# Patient Record
Sex: MALE | Employment: OTHER | URBAN - METROPOLITAN AREA
[De-identification: names, ages, dates, MRNs, and addresses within clinical notes are randomized per-mention and may not be internally consistent; named-entity substitution may affect disease eponyms.]

---

## 2017-01-05 ENCOUNTER — OFFICE VISIT (OUTPATIENT)
Dept: DERMATOLOGY | Facility: CLINIC | Age: 67
End: 2017-01-05
Payer: COMMERCIAL

## 2017-01-05 VITALS — SYSTOLIC BLOOD PRESSURE: 163 MMHG | OXYGEN SATURATION: 99 % | HEART RATE: 77 BPM | DIASTOLIC BLOOD PRESSURE: 84 MMHG

## 2017-01-05 DIAGNOSIS — L57.0 AK (ACTINIC KERATOSIS): Primary | ICD-10-CM

## 2017-01-05 PROCEDURE — 17003 DESTRUCT PREMALG LES 2-14: CPT | Performed by: PHYSICIAN ASSISTANT

## 2017-01-05 PROCEDURE — 17000 DESTRUCT PREMALG LESION: CPT | Performed by: PHYSICIAN ASSISTANT

## 2017-01-05 NOTE — PROGRESS NOTES
Cornel Yuen is a 66 year old year old male patient here today to recheck face after PDT. Patient notes he has few spots that remain on forehead, and temples. He reports that it did clear up the majority of the spots. Remainder of the HPI, Meds, PMH, Allergies, FH, and SH was reviewed in chart.    Pertinent Hx:  History of NMSC  Past Medical History   Diagnosis Date     Essential hypertension, benign      Basal cell carcinoma      Squamous cell carcinoma (H)      Actinic keratosis        Past Surgical History   Procedure Laterality Date     Tonsillectomy & adenoidectomy       Hernia repair, inguinal rt/lt       Hc repair of nasal septum       C anesth,knee joint; nos          Family History   Problem Relation Age of Onset     CANCER Father      brain tumor     DIABETES Maternal Grandmother      Alzheimer Disease Maternal Grandmother      C.A.D. Maternal Grandfather      Alzheimer Disease Maternal Grandfather      Unknown/Adopted Paternal Grandmother      C.A.D. Paternal Grandfather      Neurologic Disorder Mother      memory issues     Alzheimer Disease Mother        Social History     Social History     Marital Status:      Spouse Name: N/A     Number of Children: N/A     Years of Education: N/A     Occupational History     Not on file.     Social History Main Topics     Smoking status: Former Smoker     Smokeless tobacco: Never Used     Alcohol Use: Yes      Comment: several beers daily/ or scotch     Drug Use: Yes      Comment: occasional- marijuana     Sexual Activity:     Partners: Female     Birth Control/ Protection: Surgical      Comment: vasectomy     Other Topics Concern     Parent/Sibling W/ Cabg, Mi Or Angioplasty Before 65f 55m? No     Social History Narrative       Outpatient Encounter Prescriptions as of 1/5/2017   Medication Sig Dispense Refill     atenolol (TENORMIN) 25 MG tablet Take 1 tablet (25 mg) by mouth daily 90 tablet 3     valsartan (DIOVAN) 160 MG tablet Take 2 tablets (320 mg)  by mouth daily Patient had a cough to lisinopril.  He did not tolerate the other ARBs, they were tried before and he ended up on valsartan.  Please do prior authorization if these explanations do not help. 180 tablet 3     sildenafil (VIAGRA) 100 MG tablet Take 0.5-1 tablets ( mg) by mouth daily as needed for erectile dysfunction Take 30 min to 4 hours before intercourse.  Hold on file until needed 6 tablet 11     ASPIRIN 81 MG OR TABS 1 TABLET DAILY       ORDER FOR DME one blood pressure machine one 0     GLUCOSAMINE-CHONDROITIN 750-600 MG OR TABS 1 tab daily 0 0     No facility-administered encounter medications on file as of 1/5/2017.             Review Of Systems  Skin: As above  Eyes: negative  Ears/Nose/Throat: negative  Respiratory: No shortness of breath, dyspnea on exertion, cough, or hemoptysis  Cardiovascular: negative  Gastrointestinal: negative  Genitourinary: negative  Musculoskeletal: negative  Neurologic: negative  Psychiatric: negative  Hematologic/Lymphatic/Immunologic: negative  Endocrine: negative      O:   NAD, WDWN, Alert & Oriented, Mood & Affect wnl, Vitals stable   Here today alone   /84 mmHg  Pulse 77  SpO2 99%   General appearance normal   Vitals stable   Alert, oriented and in no acute distress      Pink gritty papules x 10 on forehead and temples       Eyes: Conjunctivae/lids:Normal     ENT: Lips    MSK:Normal    Pulm: Breathing Normal    Neuro/Psych: Orientation:Normal; Mood/Affect:Normal  A/P:  1. Actinic keratosis x 10 on face   LN2:  Treated with LN2 for 5s for 1-2 cycles. Warned risks of blistering, pain, pigment change, scarring, and incomplete resolution.  Advised patient to return if lesions do not completely resolve.  Wound care sheet given.    Recheck in 6 months.

## 2017-01-05 NOTE — MR AVS SNAPSHOT
After Visit Summary   1/5/2017    Cornel Yuen    MRN: [id number]           Patient Information     Date Of Birth          1950        Visit Information        Provider Department      1/5/2017 7:20 AM Karen Chan PA-C [facility]        Care Instructions    WOUND CARE INSTRUCTIONS   FOR CRYOSURGERY   This area treated with liquid nitrogen will form a blister. You do not need to bandage the area until after the blister forms and breaks (which may be a few days). When the blister breaks, begin daily dressing changes as follows:   1) Clean and dry the area with tap water using clean Q-tip or sterile gauze pad.   2) Apply Polysporin ointment or Bacitracin ointment over entire wound. Do NOT use Neosporin ointment.   3) Cover the wound with a band-aid or sterile non-stick gauze pad and micropore paper tape.   REPEAT THESE INSTRUCTIONS AT LEAST ONCE A DAY UNTIL THE WOUND HAS COMPLETELY HEALED.   It is an old wives tale that a wound heals better when it is exposed to air and allowed to dry out. The wound will heal faster with a better cosmetic result if it is kept moist with ointment and covered with a bandage.   Do not let the wound dry out.   IMPORTANT INFORMATION ON REVERSE SIDE   Supplies Needed:   *Cotton tipped applicators (Q-tips)   *Polysporin ointment or Bacitracin ointment (NOT NEOSPORIN)   *Band-aids, or non stick gauze pads and micropore paper tape   PATIENT INFORMATION   During the healing process you will notice a number of changes. All wounds develop a small halo of redness surrounding the wound. This means healing is occurring. Severe itching with extensive redness usually indicates sensitivity to the ointment or bandage tape used to dress the wound. You should call our office if this develops.   Swelling and/or discoloration around your surgical site is common, particularly when performed around the eye.   All wounds normally drain. The larger the wound the  "more drainage there will be. After 7-10 days, you will notice the wound beginning to shrink and new skin will begin to grow. The wound is healed when you can see skin has formed over the entire area. A healed wound has a healthy, shiny look to the surface and is red to dark pink in color to normalize. Wounds may take approximately 4-6 weeks to heal. Larger wounds may take 6-8 weeks. After the wound is healed you may discontinue dressing changes.   You may experience a sensation of tightness as your wound heals. This is normal and will gradually subside.   Your healed wound may be sensitive to temperature changes. This sensitivity improves with time, but if you re having a lot of discomfort, try to avoid temperature extremes.   Patients frequently experience itching after their wound appears to have healed because of the continue healing under the skin. Plain Vaseline will help relieve the itching.               Follow-ups after your visit        Who to contact     If you have questions or need follow up information about today's clinic visit or your schedule please contact [facility] directly at [phone].  Normal or non-critical lab and imaging results will be communicated to you by [employer], letter or phone within 4 business days after the clinic has received the results. If you do not hear from us within 7 days, please contact the clinic through ActionIQt or phone. If you have a critical or abnormal lab result, we will notify you by phone as soon as possible.  Submit refill requests through Neo[employer]x or call your pharmacy and they will forward the refill request to us. Please allow 3 business days for your refill to be completed.          Additional Information About Your Visit        Neo[employer]x Information     Neo[employer]x lets you send messages to your doctor, view your test results, renew your prescriptions, schedule appointments and more. To sign up, go to [email or web address] . Click on \"Log in\" on " "the left side of the screen, which will take you to the Welcome page. Then click on \"Sign up Now\" on the right side of the page.     You will be asked to enter the access code listed below, as well as some personal information. Please follow the directions to create your username and password.     Your access code is: [email or web address]  Expires: 2017  8:41 AM     Your access code will  in 90 days. If you need help or a new code, please call your [facility] or [phone].        Care EveryWhere ID     This is your Care EveryWhere ID. This could be used by other organizations to access your [place] medical records  [id number]        Your Vitals Were     Pulse Pulse Oximetry                77 99%           Blood Pressure from Last 3 Encounters:   17 163/84   16 146/86   10/24/16 135/88    Weight from Last 3 Encounters:   16 72.576 kg (160 lb)   02/18/15 73.029 kg (161 lb)   14 67.586 kg (149 lb)              Today, you had the following     No orders found for display       Primary Care Provider Office Phone # Fax #    Noah Brumfield [phone]       [facility] [address] [facility] [address]        Thank you!     Thank you for choosing [facility]  for your care. Our goal is always to provide you with excellent care. Hearing back from our patients is one way we can continue to improve our services. Please take a few minutes to complete the written survey that you may receive in the mail after your visit with us. Thank you!             Your Updated Medication List - Protect others around you: Learn how to safely use, store and throw away your medicines at www.disposemymeds.org.          This list is accurate as of: 17  7:27 AM.  Always use your most recent med list.                   Brand Name Dispense Instructions for use    aspirin 81 MG tablet      1 TABLET DAILY       atenolol 25 MG tablet    TENORMIN    90 tablet    " Take 1 tablet (25 mg) by mouth daily       glucosamine-chondroitinoitin 750-600 MG Tabs     0    1 tab daily       order for DME     one    one blood pressure machine       sildenafil 100 MG cap/tab    VIAGRA    6 tablet    Take 0.5-1 tablets ( mg) by mouth daily as needed for erectile dysfunction Take 30 min to 4 hours before intercourse.  Hold on file until needed       valsartan 160 MG tablet    DIOVAN    180 tablet    Take 2 tablets (320 mg) by mouth daily Patient had a cough to lisinopril.  He did not tolerate the other ARBs, they were tried before and he ended up on valsartan.  Please do prior authorization if these explanations do not help.

## 2017-01-05 NOTE — NURSING NOTE
"Chief Complaint   Patient presents with     Derm Problem     PDT fu       Initial /84 mmHg  Pulse 77  SpO2 99% Estimated body mass index is 23.62 kg/(m^2) as calculated from the following:    Height as of 12/1/16: 1.753 m (5' 9\").    Weight as of 2/24/16: 72.576 kg (160 lb).  BP completed using cuff size: jed Pillai LPN.................1/5/2017    "

## 2017-01-05 NOTE — PATIENT INSTRUCTIONS
WOUND CARE INSTRUCTIONS   FOR CRYOSURGERY   This area treated with liquid nitrogen will form a blister. You do not need to bandage the area until after the blister forms and breaks (which may be a few days). When the blister breaks, begin daily dressing changes as follows:   1) Clean and dry the area with tap water using clean Q-tip or sterile gauze pad.   2) Apply Polysporin ointment or Bacitracin ointment over entire wound. Do NOT use Neosporin ointment.   3) Cover the wound with a band-aid or sterile non-stick gauze pad and micropore paper tape.   REPEAT THESE INSTRUCTIONS AT LEAST ONCE A DAY UNTIL THE WOUND HAS COMPLETELY HEALED.   It is an old wives tale that a wound heals better when it is exposed to air and allowed to dry out. The wound will heal faster with a better cosmetic result if it is kept moist with ointment and covered with a bandage.   Do not let the wound dry out.   IMPORTANT INFORMATION ON REVERSE SIDE   Supplies Needed:   *Cotton tipped applicators (Q-tips)   *Polysporin ointment or Bacitracin ointment (NOT NEOSPORIN)   *Band-aids, or non stick gauze pads and micropore paper tape   PATIENT INFORMATION   During the healing process you will notice a number of changes. All wounds develop a small halo of redness surrounding the wound. This means healing is occurring. Severe itching with extensive redness usually indicates sensitivity to the ointment or bandage tape used to dress the wound. You should call our office if this develops.   Swelling and/or discoloration around your surgical site is common, particularly when performed around the eye.   All wounds normally drain. The larger the wound the more drainage there will be. After 7-10 days, you will notice the wound beginning to shrink and new skin will begin to grow. The wound is healed when you can see skin has formed over the entire area. A healed wound has a healthy, shiny look to the surface and is red to dark pink in color to normalize.  Wounds may take approximately 4-6 weeks to heal. Larger wounds may take 6-8 weeks. After the wound is healed you may discontinue dressing changes.   You may experience a sensation of tightness as your wound heals. This is normal and will gradually subside.   Your healed wound may be sensitive to temperature changes. This sensitivity improves with time, but if you re having a lot of discomfort, try to avoid temperature extremes.   Patients frequently experience itching after their wound appears to have healed because of the continue healing under the skin. Plain Vaseline will help relieve the itching.

## 2017-05-09 ENCOUNTER — OFFICE VISIT (OUTPATIENT)
Dept: FAMILY MEDICINE | Facility: CLINIC | Age: 67
End: 2017-05-09
Payer: COMMERCIAL

## 2017-05-09 VITALS — SYSTOLIC BLOOD PRESSURE: 136 MMHG | HEART RATE: 64 BPM | DIASTOLIC BLOOD PRESSURE: 78 MMHG | TEMPERATURE: 98 F

## 2017-05-09 DIAGNOSIS — N52.9 ERECTILE DYSFUNCTION, UNSPECIFIED ERECTILE DYSFUNCTION TYPE: ICD-10-CM

## 2017-05-09 DIAGNOSIS — Z23 NEED FOR VACCINATION: Primary | ICD-10-CM

## 2017-05-09 DIAGNOSIS — I10 ESSENTIAL HYPERTENSION, BENIGN: ICD-10-CM

## 2017-05-09 DIAGNOSIS — Z12.5 SCREENING FOR PROSTATE CANCER: ICD-10-CM

## 2017-05-09 DIAGNOSIS — E78.5 HYPERLIPIDEMIA LDL GOAL <130: ICD-10-CM

## 2017-05-09 LAB
ANION GAP SERPL CALCULATED.3IONS-SCNC: 6 MMOL/L (ref 3–14)
BUN SERPL-MCNC: 21 MG/DL (ref 7–30)
CALCIUM SERPL-MCNC: 9.4 MG/DL (ref 8.5–10.1)
CHLORIDE SERPL-SCNC: 101 MMOL/L (ref 94–109)
CO2 SERPL-SCNC: 28 MMOL/L (ref 20–32)
CREAT SERPL-MCNC: 0.88 MG/DL (ref 0.66–1.25)
GFR SERPL CREATININE-BSD FRML MDRD: 87 ML/MIN/1.7M2
GLUCOSE SERPL-MCNC: 110 MG/DL (ref 70–99)
POTASSIUM SERPL-SCNC: 4.1 MMOL/L (ref 3.4–5.3)
PSA SERPL-ACNC: 0.7 UG/L (ref 0–4)
SODIUM SERPL-SCNC: 135 MMOL/L (ref 133–144)

## 2017-05-09 PROCEDURE — 90471 IMMUNIZATION ADMIN: CPT | Performed by: FAMILY MEDICINE

## 2017-05-09 PROCEDURE — 99213 OFFICE O/P EST LOW 20 MIN: CPT | Mod: 25 | Performed by: FAMILY MEDICINE

## 2017-05-09 PROCEDURE — 90736 HZV VACCINE LIVE SUBQ: CPT | Performed by: FAMILY MEDICINE

## 2017-05-09 PROCEDURE — 36415 COLL VENOUS BLD VENIPUNCTURE: CPT | Performed by: FAMILY MEDICINE

## 2017-05-09 PROCEDURE — 80048 BASIC METABOLIC PNL TOTAL CA: CPT | Performed by: FAMILY MEDICINE

## 2017-05-09 PROCEDURE — G0103 PSA SCREENING: HCPCS | Performed by: FAMILY MEDICINE

## 2017-05-09 RX ORDER — ATENOLOL 25 MG/1
25 TABLET ORAL DAILY
Qty: 90 TABLET | Refills: 3 | Status: SHIPPED | OUTPATIENT
Start: 2017-05-09 | End: 2018-06-08

## 2017-05-09 RX ORDER — VALSARTAN 160 MG/1
320 TABLET ORAL DAILY
Qty: 180 TABLET | Refills: 3 | Status: SHIPPED | OUTPATIENT
Start: 2017-05-09 | End: 2018-06-08

## 2017-05-09 RX ORDER — SILDENAFIL 100 MG/1
50-100 TABLET, FILM COATED ORAL DAILY PRN
Qty: 6 TABLET | Refills: 11 | Status: SHIPPED | OUTPATIENT
Start: 2017-05-09 | End: 2018-06-08

## 2017-05-09 NOTE — PROGRESS NOTES
SUBJECTIVE:                                                    Cornel Yuen is a 66 year old male who presents to clinic today for the following health issues:      Hypertension Follow-up      Outpatient blood pressures are being checked at other doctor/dentist appts.  Results are 130s-140s.    Low Salt Diet: no added salt       Amount of exercise or physical activity: has an active job    Problems taking medications regularly: No    Medication side effects: occ dizziness    Diet: low salt    Needs refill of his medications.  No side effect.  He will be traveling to [place] and wanted information.  Discussed [email or web address] as a place for information.        Problem list and histories reviewed & adjusted, as indicated.  Additional history: as documented        Reviewed and updated as needed this visit by clinical staff  Tobacco  Med Hx  Surg Hx  Fam Hx  Soc Hx      Reviewed and updated as needed this visit by Provider         ROS:  CONSTITUTIONAL:NEGATIVE for fever, chills, change in weight  RESP:NEGATIVE for significant cough or SOB  CV: NEGATIVE for chest pain, palpitations or peripheral edema  NEURO: NEGATIVE for weakness, dizziness or paresthesias  PSYCHIATRIC: NEGATIVE for changes in mood or affect    OBJECTIVE:                                                    /78 (Cuff Size: Adult Large)  Pulse 64  Temp 98  F (36.7  C) (Tympanic)  There is no height or weight on file to calculate BMI.  GENERAL APPEARANCE: healthy, alert and no distress  RESP: lungs clear to auscultation - no rales, rhonchi or wheezes  CV: regular rates and rhythm, normal S1 S2, no S3 or S4 and no murmur, click or rub  MS: extremities normal- no gross deformities noted  SKIN: no suspicious lesions or rashes  NEURO: Normal strength and tone, mentation intact and speech normal  PSYCH: mentation appears normal and affect normal/bright         ASSESSMENT/PLAN:                                                    1. BENIGN  HYPERTENSION  Controlled and refilled med, go to lab  - atenolol (TENORMIN) 25 MG tablet; Take 1 tablet (25 mg) by mouth daily  Dispense: 90 tablet; Refill: 3  - valsartan (DIOVAN) 160 MG tablet; Take 2 tablets (320 mg) by mouth daily He did not tolerate the other ARBs, they were tried before and he ended up on valsartan.  Dispense: 180 tablet; Refill: 3    2. Erectile dysfunction, unspecified erectile dysfunction type  Refilled med  - sildenafil (VIAGRA) 100 MG cap/tab; Take 0.5-1 tablets ( mg) by mouth daily as needed for erectile dysfunction Hold on file until needed  Dispense: 6 tablet; Refill: 11      See Patient Instructions  Counseled on travel, at this time he is very low risk for needing hep b for travel and the type of activity he will be doing.    Counseling/Coordination of care is over 10 min in 15 min appt.      Noah Brumfield MD  [facility]

## 2017-05-09 NOTE — MR AVS SNAPSHOT
After Visit Summary   5/9/2017    Cornel Yuen    MRN: [id number]           Patient Information     Date Of Birth          1950        Visit Information        Provider Department      5/9/2017 8:00 AM Noah Brumfield MD [facility]        Today's Diagnoses     BENIGN HYPERTENSION        Erectile dysfunction, unspecified erectile dysfunction type          Care Instructions    I refilled your medications for the year.    Please go to lab.    We will give you your shingles vaccine.    You can always go to [email or web address]/travel for any information before you go on your trip.          Thank you for choosing [facility].  You may be receiving a survey in the mail from [employer] regarding your visit today.  Please take a few minutes to complete and return the survey to let us know how we are doing.      If you have questions or concerns, please contact us via [employer] or you can contact your care team at [phone].    Our Clinic hours are:  Monday 6:40 am  to 7:00 pm  Tuesday -Friday 6:40 am to 5:00 pm    The [place] outpatient lab hours are:  Monday - Friday 6:10 am to 4:45 pm  Saturdays 7:00 am to 11:00 am  Appointments are required, call [phone]    If you have clinical questions after hours or would like to schedule an appointment,  call the clinic at [phone].          Follow-ups after your visit        Who to contact     If you have questions or need follow up information about today's clinic visit or your schedule please contact [facility] directly at [phone].  Normal or non-critical lab and imaging results will be communicated to you by MyChart, letter or phone within 4 business days after the clinic has received the results. If you do not hear from us within 7 days, please contact the clinic through [employer] or phone. If you have a critical or abnormal lab result, we will notify you by phone as soon as possible.  Submit refill requests through  "Anupt or call your pharmacy and they will forward the refill request to us. Please allow 3 business days for your refill to be completed.          Additional Information About Your Visit        MyChart Information     A[employer] Music Technologyhart lets you send messages to your doctor, view your test results, renew your prescriptions, schedule appointments and more. To sign up, go to [email or web address] . Click on \"Log in\" on the left side of the screen, which will take you to the Welcome page. Then click on \"Sign up Now\" on the right side of the page.     You will be asked to enter the access code listed below, as well as some personal information. Please follow the directions to create your username and password.     Your access code is: [email or web address]  Expires: 2017  8:28 AM     Your access code will  in 90 days. If you need help or a new code, please call your [place] clinic or [phone].        Care EveryWhere ID     This is your Care EveryWhere ID. This could be used by other organizations to access your [place] medical records  [id number]        Your Vitals Were     Pulse Temperature                64 98  F (36.7  C) (Tympanic)           Blood Pressure from Last 3 Encounters:   17 136/78   17 163/84   16 146/86    Weight from Last 3 Encounters:   16 160 lb (72.6 kg)   02/18/15 161 lb (73 kg)   14 149 lb (67.6 kg)              Today, you had the following     No orders found for display         Today's Medication Changes          These changes are accurate as of: 17  8:28 AM.  If you have any questions, ask your nurse or doctor.               These medicines have changed or have updated prescriptions.        Dose/Directions    sildenafil 100 MG cap/tab   Commonly known as:  VIAGRA   This may have changed:  additional instructions   Used for:  Erectile dysfunction, unspecified erectile dysfunction type   Changed by:  Noah Brumfield MD        Dose:   mg   Take 0.5-1 " tablets ( mg) by mouth daily as needed for erectile dysfunction Hold on file until needed   Quantity:  6 tablet   Refills:  11       valsartan 160 MG tablet   Commonly known as:  DIOVAN   This may have changed:  additional instructions   Used for:  Essential hypertension, benign   Changed by:  Noah Brumfield MD        Dose:  320 mg   Take 2 tablets (320 mg) by mouth daily He did not tolerate the other ARBs, they were tried before and he ended up on valsartan.   Quantity:  180 tablet   Refills:  3            Where to get your medicines      These [facility] [id number] [place] [address]  [address] [place] [id number]     Phone:  [phone]     atenolol 25 MG tablet    sildenafil 100 MG cap/tab    valsartan 160 MG tablet                Primary Care Provider Office Phone # Fax #    Noah Brumfield [phone]       [facility] [address] [facility] [address]        Thank you!     Thank you for choosing [facility]  for your care. Our goal is always to provide you with excellent care. Hearing back from our patients is one way we can continue to improve our services. Please take a few minutes to complete the written survey that you may receive in the mail after your visit with us. Thank you!             Your Updated Medication List - Protect others around you: Learn how to safely use, store and throw away your medicines at www.disposemymeds.org.          This list is accurate as of: 5/9/17  8:28 AM.  Always use your most recent med list.                   Brand Name Dispense Instructions for use    aspirin 81 MG tablet      1 TABLET DAILY       atenolol 25 MG tablet    TENORMIN    90 tablet    Take 1 tablet (25 mg) by mouth daily       glucosamine-chondroitinoitin 750-600 MG Tabs     0    1 tab daily       order for DME     one    one blood pressure machine       sildenafil 100 MG cap/tab    VIAGRA    6  tablet    Take 0.5-1 tablets ( mg) by mouth daily as needed for erectile dysfunction Hold on file until needed       valsartan 160 MG tablet    DIOVAN    180 tablet    Take 2 tablets (320 mg) by mouth daily He did not tolerate the other ARBs, they were tried before and he ended up on valsartan.

## 2017-05-09 NOTE — PATIENT INSTRUCTIONS
I refilled your medications for the year.    Please go to lab.    We will give you your shingles vaccine.    You can always go to cdc.gov/travel for any information before you go on your trip.          Thank you for choosing [facility].  You may be receiving a survey in the mail from Jayy Garcia regarding your visit today.  Please take a few minutes to complete and return the survey to let us know how we are doing.      If you have questions or concerns, please contact us via Cloudmark or you can contact your care team at [phone].    Our Clinic hours are:  Monday 6:40 am  to 7:00 pm  Tuesday -Friday 6:40 am to 5:00 pm    The [place] outpatient lab hours are:  Monday - Friday 6:10 am to 4:45 pm  Saturdays 7:00 am to 11:00 am  Appointments are required, call [phone]    If you have clinical questions after hours or would like to schedule an appointment,  call the clinic at [phone].

## 2017-05-09 NOTE — NURSING NOTE
"Chief Complaint   Patient presents with     Hypertension     recheck B/P was elevated at Derm appt. medication refill.       Initial /78 (Cuff Size: Adult Large)  Pulse 64  Temp 98  F (36.7  C) (Tympanic) Estimated body mass index is 23.29 kg/(m^2) as calculated from the following:    Height as of 2/24/16: 5' 9.5\" (1.765 m).    Weight as of 2/24/16: 160 lb (72.6 kg).  Medication Reconciliation: complete     Screening Questionnaire for Adult Immunization    Are you sick today?   No   Do you have allergies to medications, food, a vaccine component or latex?   No   Have you ever had a serious reaction after receiving a vaccination?   No   Do you have a long-term health problem with heart disease, lung disease, asthma, kidney disease, metabolic disease (e.g. diabetes), anemia, or other blood disorder?   No   Do you have cancer, leukemia, HIV/AIDS, or any other immune system problem?   No   In the past 3 months, have you taken medications that affect  your immune system, such as prednisone, other steroids, or anticancer drugs; drugs for the treatment of rheumatoid arthritis, Crohn s disease, or psoriasis; or have you had radiation treatments?   No   Have you had a seizure, or a brain or other nervous system problem?   No   During the past year, have you received a transfusion of blood or blood     products, or been given immune (gamma) globulin or antiviral drug?   No   For women: Are you pregnant or is there a chance you could become        pregnant during the next month?   No   Have you received any vaccinations in the past 4 weeks?   No     Immunization questionnaire answers were all negative.      Per orders of Dr. Orestes Brumfield, injection of Zostavax given by Shanta Kelley.   Patient instructed to remain in clinic for 20 minutes afterwards, and to report any adverse reaction to me immediately.       Screening performed by Shanta Kelley on 5/9/2017 at 8:47 AM.    "

## 2017-05-09 NOTE — LETTER
[facility]  [address] [place] [address]  Phone: [phone]    May 9, 2017    Cornel H Wilfrid  [address] [place] [address]          Dear Areli Yuen,    The results of your recent lab tests were :Normal kidney function.  No signs of prostate cancer. .  Component      Latest Ref Rng & Units 5/9/2017   Sodium      133 - 144 mmol/L 135   Potassium      3.4 - 5.3 mmol/L 4.1   Chloride      94 - 109 mmol/L 101   Carbon Dioxide      20 - 32 mmol/L 28   Anion Gap      3 - 14 mmol/L 6   Glucose      70 - 99 mg/dL 110 (H)   Urea Nitrogen      7 - 30 mg/dL 21   Creatinine      0.66 - 1.25 mg/dL 0.88   GFR Estimate      >60 mL/min/1.7m2 87   GFR Estimate If Black      >60 mL/min/1.7m2 >90 . . .   Calcium      8.5 - 10.1 mg/dL 9.4   PSA      0 - 4 ug/L 0.70      If you have any further questions or problems, please contact our office.    Sincerely,      Noah Brumfield MD / chandrika

## 2017-05-09 NOTE — LETTER
[facility]  [address] [address]  [phone]        May 18, 2018    Cornel Yuen  [address]  [facility] [place] [address]              Dear Cornel Yuen    This is to remind you that your fasting cholesterol is due.    You may call our office at [phone] to schedule an appointment.    Please disregard this notice if you have already had your labs drawn or made an appointment.        Sincerely,        Noah Brumfield MD

## 2017-10-20 ENCOUNTER — ALLIED HEALTH/NURSE VISIT (OUTPATIENT)
Dept: FAMILY MEDICINE | Facility: CLINIC | Age: 67
End: 2017-10-20
Payer: COMMERCIAL

## 2017-10-20 DIAGNOSIS — Z23 NEED FOR PROPHYLACTIC VACCINATION AND INOCULATION AGAINST INFLUENZA: Primary | ICD-10-CM

## 2017-10-20 PROCEDURE — 90662 IIV NO PRSV INCREASED AG IM: CPT

## 2017-10-20 PROCEDURE — 99207 ZZC NO CHARGE NURSE ONLY: CPT

## 2017-10-20 PROCEDURE — 90471 IMMUNIZATION ADMIN: CPT

## 2017-10-20 NOTE — PROGRESS NOTES
Injectable Influenza Immunization Documentation    1.  Is the person to be vaccinated sick today?   No    2. Does the person to be vaccinated have an allergy to a component   of the vaccine?   No  Egg Allergy Algorithm Link    3. Has the person to be vaccinated ever had a serious reaction   to influenza vaccine in the past?   No    4. Has the person to be vaccinated ever had Guillain-Barré syndrome?   No    Form completed by Claudia Do CMA  Per orders of Dr. White, injection of flu vaccine given by Claudia Do. Patient instructed to remain in clinic for 15 minutes afterwards, and to report any adverse reaction to me immediately.

## 2017-10-20 NOTE — MR AVS SNAPSHOT
"              After Visit Summary   10/20/2017    Cornel Yuen    MRN: [id number]           Patient Information     Date Of Birth          1950        Visit Information        Provider Department      10/20/2017 8:55 AM [facility]        Today's Diagnoses     Need for prophylactic vaccination and inoculation against influenza    -  1       Follow-ups after your visit        Who to contact     If you have questions or need follow up information about today's clinic visit or your schedule please contact [facility] directly at [phone].  Normal or non-critical lab and imaging results will be communicated to you by Aductionshart, letter or phone within 4 business days after the clinic has received the results. If you do not hear from us within 7 days, please contact the clinic through [employer] or phone. If you have a critical or abnormal lab result, we will notify you by phone as soon as possible.  Submit refill requests through [employer] or call your pharmacy and they will forward the refill request to us. Please allow 3 business days for your refill to be completed.          Additional Information About Your Visit        MyChart Information     [employer] lets you send messages to your doctor, view your test results, renew your prescriptions, schedule appointments and more. To sign up, go to [email or web address] . Click on \"Log in\" on the left side of the screen, which will take you to the Welcome page. Then click on \"Sign up Now\" on the right side of the page.     You will be asked to enter the access code listed below, as well as some personal information. Please follow the directions to create your username and password.     Your access code is: [email or web address]  Expires: 2018  9:03 AM     Your access code will  in 90 days. If you need help or a new code, please call your [facility] or [phone].        Care EveryWhere ID     This is your Care " EveryWhere ID. This could be used by other organizations to access your [place] medical records  [id number]         Blood Pressure from Last 3 Encounters:   05/09/17 136/78   01/05/17 163/84   12/01/16 146/86    Weight from Last 3 Encounters:   02/24/16 160 lb (72.6 kg)   02/18/15 161 lb (73 kg)   02/19/14 149 lb (67.6 kg)              We Performed the Following     FLU VACCINE, INCREASED ANTIGEN, PRESV FREE, AGE 65+ [35751]     Vaccine Administration, Initial [50177]        Primary Care Provider Office Phone # Fax #    Noah Brumfield [phone] [address] [address]        Equal Access to Services     JOSE AZAR : Emely lopezo Somichaelali, waaxda luqadaha, qaybta kaalmada adeegyada, jon cisneros. So [facility] [phone].    ATENCIÓN: Si habla español, tiene a amor disposición servicios gratuitos de asistencia lingüística. [facility] [phone].    We comply with applicable federal civil rights laws and [place] laws. We do not discriminate on the basis of race, color, national origin, age, disability, sex, sexual orientation, or gender identity.            Thank you!     Thank you for choosing [facility]  for your care. Our goal is always to provide you with excellent care. Hearing back from our patients is one way we can continue to improve our services. Please take a few minutes to complete the written survey that you may receive in the mail after your visit with us. Thank you!             Your Updated Medication List - Protect others around you: Learn how to safely use, store and throw away your medicines at www.disposemymeds.org.          This list is accurate as of: 10/20/17  9:03 AM.  Always use your most recent med list.                   Brand Name Dispense Instructions for use Diagnosis    aspirin 81 MG tablet      1 TABLET DAILY        atenolol 25 MG tablet    TENORMIN    90 tablet    Take 1 tablet (25 mg) by mouth  daily    Essential hypertension, benign       glucosamine-chondroitinoitin 750-600 MG Tabs     0    1 tab daily    Knee pain       order for DME     one    one blood pressure machine    Essential hypertension, benign       sildenafil 100 MG tablet    VIAGRA    6 tablet    Take 0.5-1 tablets ( mg) by mouth daily as needed for erectile dysfunction Hold on file until needed    Erectile dysfunction, unspecified erectile dysfunction type       valsartan 160 MG tablet    DIOVAN    180 tablet    Take 2 tablets (320 mg) by mouth daily He did not tolerate the other ARBs, they were tried before and he ended up on valsartan.    Essential hypertension, benign

## 2018-02-19 ENCOUNTER — OFFICE VISIT (OUTPATIENT)
Dept: DERMATOLOGY | Facility: CLINIC | Age: 68
End: 2018-02-19
Payer: COMMERCIAL

## 2018-02-19 VITALS — DIASTOLIC BLOOD PRESSURE: 77 MMHG | SYSTOLIC BLOOD PRESSURE: 137 MMHG | HEART RATE: 66 BPM | OXYGEN SATURATION: 96 %

## 2018-02-19 DIAGNOSIS — L81.4 LENTIGO: Primary | ICD-10-CM

## 2018-02-19 DIAGNOSIS — L82.1 SK (SEBORRHEIC KERATOSIS): ICD-10-CM

## 2018-02-19 DIAGNOSIS — L57.0 AK (ACTINIC KERATOSIS): ICD-10-CM

## 2018-02-19 DIAGNOSIS — Z85.828 HISTORY OF SKIN CANCER: ICD-10-CM

## 2018-02-19 DIAGNOSIS — D18.00 ANGIOMA: ICD-10-CM

## 2018-02-19 PROCEDURE — 99213 OFFICE O/P EST LOW 20 MIN: CPT | Mod: 25 | Performed by: DERMATOLOGY

## 2018-02-19 PROCEDURE — 17000 DESTRUCT PREMALG LESION: CPT | Performed by: DERMATOLOGY

## 2018-02-19 PROCEDURE — 17003 DESTRUCT PREMALG LES 2-14: CPT | Performed by: DERMATOLOGY

## 2018-02-19 NOTE — NURSING NOTE
Chief Complaint   Patient presents with     Skin Check       Vitals:    02/19/18 1303   BP: 137/77   Pulse: 66   SpO2: 96%     Wt Readings from Last 1 Encounters:   02/24/16 72.6 kg (160 lb)       Chioma Pillai LPN.................2/19/2018

## 2018-02-19 NOTE — MR AVS SNAPSHOT
"              After Visit Summary   2018    Cornel Yuen    MRN: [id number]           Patient Information     Date Of Birth          1950        Visit Information        Provider Department      2018 1:00 PM Edin De La Cruz MD [facility]        Today's Diagnoses     Lentigo    -  1    SK (seborrheic keratosis)        Angioma        History of skin cancer        AK (actinic keratosis)           Follow-ups after your visit        Who to contact     If you have questions or need follow up information about today's clinic visit or your schedule please contact [facility] directly at [phone].  Normal or non-critical lab and imaging results will be communicated to you by [employer], letter or phone within 4 business days after the clinic has received the results. If you do not hear from us within 7 days, please contact the clinic through MyChart or phone. If you have a critical or abnormal lab result, we will notify you by phone as soon as possible.  Submit refill requests through [employer] or call your pharmacy and they will forward the refill request to us. Please allow 3 business days for your refill to be completed.          Additional Information About Your Visit        MyChart Information     [employer] lets you send messages to your doctor, view your test results, renew your prescriptions, schedule appointments and more. To sign up, go to [email or web address] . Click on \"Log in\" on the left side of the screen, which will take you to the Welcome page. Then click on \"Sign up Now\" on the right side of the page.     You will be asked to enter the access code listed below, as well as some personal information. Please follow the directions to create your username and password.     Your access code is: [email or web address]  Expires: 2018  1:09 PM     Your access code will  in 90 days. If you need help or a new code, please call your [facility] or " [phone].        Care EveryWhere ID     This is your Care EveryWhere ID. This could be used by other organizations to access your [place] medical records  [id number]        Your Vitals Were     Pulse Pulse Oximetry                66 96%           Blood Pressure from Last 3 Encounters:   02/19/18 137/77   05/09/17 136/78   01/05/17 163/84    Weight from Last 3 Encounters:   02/24/16 72.6 kg (160 lb)   02/18/15 73 kg (161 lb)   02/19/14 67.6 kg (149 lb)              We Performed the Following     DESTRUCT PREMALIGNANT LESION, 2-14     DESTRUCT PREMALIGNANT LESION, FIRST        Primary Care Provider Office Phone # Fax #    Noah Brumfield [phone] [address] [facility] [address]        Equal Access to Services     JOSE AZAR : Hadii denita booth hadasho Sothomas, waaxda luqadaha, qaybta kaalmada adeafricayada, jon booker . So [facility] [phone].    ATENCIÓN: Si habla español, tiene a amor disposición servicios gratuitos de asistencia lingüística. Leola al [phone].    We comply with applicable federal civil rights laws and [place] laws. We do not discriminate on the basis of race, color, national origin, age, disability, sex, sexual orientation, or gender identity.            Thank you!     Thank you for choosing [facility]  for your care. Our goal is always to provide you with excellent care. Hearing back from our patients is one way we can continue to improve our services. Please take a few minutes to complete the written survey that you may receive in the mail after your visit with us. Thank you!             Your Updated Medication List - Protect others around you: Learn how to safely use, store and throw away your medicines at www.disposemymeds.org.          This list is accurate as of 2/19/18  1:09 PM.  Always use your most recent med list.                   Brand Name Dispense Instructions for use Diagnosis    aspirin 81 MG tablet       1 TABLET DAILY        atenolol 25 MG tablet    TENORMIN    90 tablet    Take 1 tablet (25 mg) by mouth daily    Essential hypertension, benign       glucosamine-chondroitinoitin 750-600 MG Tabs     0    1 tab daily    Knee pain       order for DME     one    one blood pressure machine    Essential hypertension, benign       sildenafil 100 MG tablet    VIAGRA    6 tablet    Take 0.5-1 tablets ( mg) by mouth daily as needed for erectile dysfunction Hold on file until needed    Erectile dysfunction, unspecified erectile dysfunction type       valsartan 160 MG tablet    DIOVAN    180 tablet    Take 2 tablets (320 mg) by mouth daily He did not tolerate the other ARBs, they were tried before and he ended up on valsartan.    Essential hypertension, benign

## 2018-02-19 NOTE — PROGRESS NOTES
Cornel Yuen is a 67 year old year old male patient here today for f/u hxof actinic keratosis and non-melanoma skin cancer  He noes some rough spot son hands today.  .  Patient states this has been present for months.  Patient reports the following symptoms:  none .  Patient reports the following previous treatments none.  Patient reports the following modifying factors none.  Associated symptoms: none.  Patient has no other skin complaints today.  Remainder of the HPI, Meds, PMH, Allergies, FH, and SH was reviewed in chart.    Pertinent Hx:   Non-melanoma skin cancer and actinic keratosis   Past Medical History:   Diagnosis Date     Actinic keratosis      Basal cell carcinoma      Essential hypertension, benign      Squamous cell carcinoma        Past Surgical History:   Procedure Laterality Date     C ANESTH,KNEE JOINT; NOS       HC REPAIR OF NASAL SEPTUM       HERNIA REPAIR, INGUINAL RT/LT       TONSILLECTOMY & ADENOIDECTOMY          Family History   Problem Relation Age of Onset     CANCER Father      brain tumor     DIABETES Maternal Grandmother      Alzheimer Disease Maternal Grandmother      C.A.D. Maternal Grandfather      Alzheimer Disease Maternal Grandfather      Unknown/Adopted Paternal Grandmother      C.A.D. Paternal Grandfather      Neurologic Disorder Mother      memory issues     Alzheimer Disease Mother      Asthma Daughter        Social History     Social History     Marital status:      Spouse name: N/A     Number of children: N/A     Years of education: N/A     Occupational History     Not on file.     Social History Main Topics     Smoking status: Former Smoker     Smokeless tobacco: Never Used     Alcohol use Yes      Comment: 2-3 scotch per night     Drug use: Yes      Comment: occasional- marijuana     Sexual activity: Yes     Partners: Female     Birth control/ protection: Surgical      Comment: vasectomy     Other Topics Concern     Parent/Sibling W/ Cabg, Mi Or Angioplasty  Before 65f 55m? No     Social History Narrative       Outpatient Encounter Prescriptions as of 2/19/2018   Medication Sig Dispense Refill     atenolol (TENORMIN) 25 MG tablet Take 1 tablet (25 mg) by mouth daily 90 tablet 3     valsartan (DIOVAN) 160 MG tablet Take 2 tablets (320 mg) by mouth daily He did not tolerate the other ARBs, they were tried before and he ended up on valsartan. 180 tablet 3     sildenafil (VIAGRA) 100 MG cap/tab Take 0.5-1 tablets ( mg) by mouth daily as needed for erectile dysfunction Hold on file until needed 6 tablet 11     ASPIRIN 81 MG OR TABS 1 TABLET DAILY       ORDER FOR DME one blood pressure machine one 0     GLUCOSAMINE-CHONDROITIN 750-600 MG OR TABS 1 tab daily 0 0     No facility-administered encounter medications on file as of 2/19/2018.              Review Of Systems  Skin: As above  Eyes: negative  Ears/Nose/Throat: negative  Respiratory: No shortness of breath, dyspnea on exertion, cough, or hemoptysis  Cardiovascular: negative  Gastrointestinal: negative  Genitourinary: negative  Musculoskeletal: negative  Neurologic: negative  Psychiatric: negative  Hematologic/Lymphatic/Immunologic: negative  Endocrine: negative      O:   NAD, WDWN, Alert & Oriented, Mood & Affect wnl, Vitals stable   Here today alone   There were no vitals taken for this visit.   General appearance normal   Vitals stable   Alert, oriented and in no acute distress      Following lymph nodes palpated: Occipital, Cervical, Supraclavicular no lad   Stuck on papules and brown macules on trunk and ext    Red papules on trunk   r hand two gritty papules, L hand 3 gritty papules l forehead 3 gritty papules     The remainder of expanded problem focused exam was unremarkable; the following areas were examined:  scalp/hair, conjunctiva/lids, face, neck, lips, back, anb, chest, digits/nails, RUE, LUE.      Eyes: Conjunctivae/lids:Normal     ENT: Lips, buccal mucosa, tongue:  normal    MSK:Normal    Cardiovascular: peripheral edema none    Pulm: Breathing Normal    Lymph Nodes: No Head and Neck Lymphadenopathy     Neuro/Psych: Orientation:Normal; Mood/Affect:Normal      A/P:  1. Seborrheic keratosis, letnigo, angioma  2. Hx of non-melanoma skin cancer   3. Actinic keratosis   L handx3, R handx2, L forehead x3  LN2:  Treated with LN2 for 5s for 1-2 cycles. Warned risks of blistering, pain, pigment change, scarring, and incomplete resolution.  Advised patient to return if lesions do not completely resolve.  Wound care sheet given.    BENIGN LESIONS DISCUSSED WITH PATIENT:  I discussed the specifics of tumor, prognosis, and genetics of benign lesions.  I explained that treatment of these lesions would be purely cosmetic and not medically neccessary.  I discussed with patient different removal options including excision, cautery and /or laser.      Nature and genetics of benign skin lesions dicussed with patient.  Signs and Symptoms of skin cancer discussed with patient.  Patient encouraged to perform monthly skin exams.  UV precautions reviewed with patient.  Skin care regimen reviewed with patient: Eliminate harsh soaps, i.e. Dial, zest, irsih spring; Mild soaps such as Cetaphil or Dove sensitive skin, avoid hot or cold showers, aggressive use of emollients including vanicream, cetaphil or cerave discussed with patient.    Risks of non-melanoma skin cancer discussed with patient   Return to clinic 6 months

## 2018-06-06 DIAGNOSIS — I10 ESSENTIAL HYPERTENSION, BENIGN: ICD-10-CM

## 2018-06-06 LAB
CHOLEST SERPL-MCNC: 225 MG/DL
HDLC SERPL-MCNC: 61 MG/DL
LDLC SERPL CALC-MCNC: 135 MG/DL
NONHDLC SERPL-MCNC: 164 MG/DL
TRIGL SERPL-MCNC: 143 MG/DL

## 2018-06-06 PROCEDURE — 36415 COLL VENOUS BLD VENIPUNCTURE: CPT | Performed by: FAMILY MEDICINE

## 2018-06-06 PROCEDURE — 80061 LIPID PANEL: CPT | Performed by: FAMILY MEDICINE

## 2018-06-08 ENCOUNTER — OFFICE VISIT (OUTPATIENT)
Dept: FAMILY MEDICINE | Facility: CLINIC | Age: 68
End: 2018-06-08
Payer: COMMERCIAL

## 2018-06-08 VITALS
BODY MASS INDEX: 23.08 KG/M2 | WEIGHT: 155.8 LBS | TEMPERATURE: 97.3 F | HEART RATE: 60 BPM | DIASTOLIC BLOOD PRESSURE: 80 MMHG | SYSTOLIC BLOOD PRESSURE: 132 MMHG | HEIGHT: 69 IN

## 2018-06-08 DIAGNOSIS — N52.9 ERECTILE DYSFUNCTION, UNSPECIFIED ERECTILE DYSFUNCTION TYPE: ICD-10-CM

## 2018-06-08 DIAGNOSIS — I10 ESSENTIAL HYPERTENSION, BENIGN: ICD-10-CM

## 2018-06-08 DIAGNOSIS — Z00.00 ENCOUNTER FOR ROUTINE ADULT HEALTH EXAMINATION WITHOUT ABNORMAL FINDINGS: Primary | ICD-10-CM

## 2018-06-08 DIAGNOSIS — R68.82 LOW LIBIDO: ICD-10-CM

## 2018-06-08 DIAGNOSIS — Z12.5 SCREENING FOR PROSTATE CANCER: ICD-10-CM

## 2018-06-08 DIAGNOSIS — Z23 NEED FOR VACCINATION: ICD-10-CM

## 2018-06-08 PROCEDURE — G0402 INITIAL PREVENTIVE EXAM: HCPCS | Performed by: FAMILY MEDICINE

## 2018-06-08 PROCEDURE — 99213 OFFICE O/P EST LOW 20 MIN: CPT | Mod: 25 | Performed by: FAMILY MEDICINE

## 2018-06-08 PROCEDURE — 90670 PCV13 VACCINE IM: CPT | Performed by: FAMILY MEDICINE

## 2018-06-08 PROCEDURE — G0009 ADMIN PNEUMOCOCCAL VACCINE: HCPCS | Performed by: FAMILY MEDICINE

## 2018-06-08 RX ORDER — VALSARTAN 160 MG/1
320 TABLET ORAL DAILY
Qty: 180 TABLET | Refills: 3 | Status: SHIPPED | OUTPATIENT
Start: 2018-06-08 | End: 2018-09-19

## 2018-06-08 RX ORDER — ATENOLOL 25 MG/1
25 TABLET ORAL DAILY
Qty: 90 TABLET | Refills: 3 | Status: SHIPPED | OUTPATIENT
Start: 2018-06-08 | End: 2019-07-10

## 2018-06-08 ASSESSMENT — ANXIETY QUESTIONNAIRES
4. TROUBLE RELAXING: NOT AT ALL
6. BECOMING EASILY ANNOYED OR IRRITABLE: NOT AT ALL
3. WORRYING TOO MUCH ABOUT DIFFERENT THINGS: NOT AT ALL
2. NOT BEING ABLE TO STOP OR CONTROL WORRYING: NOT AT ALL
5. BEING SO RESTLESS THAT IT IS HARD TO SIT STILL: NOT AT ALL
GAD7 TOTAL SCORE: 0
7. FEELING AFRAID AS IF SOMETHING AWFUL MIGHT HAPPEN: NOT AT ALL
1. FEELING NERVOUS, ANXIOUS, OR ON EDGE: NOT AT ALL

## 2018-06-08 NOTE — PATIENT INSTRUCTIONS
Please make a morning lab visit to check your labs. (7-9 am)    I refilled your medication.    Preventive Health Recommendations:       Male Ages 65 and over    Yearly exam:             See your health care provider every year in order to  o   Review health changes.   o   Discuss preventive care.    o   Review your medicines if your doctor has prescribed any.    Talk with your health care provider about whether you should have a test to screen for prostate cancer (PSA).    Every 3 years, have a diabetes test (fasting glucose). If you are at risk for diabetes, you should have this test more often.    Every 5 years, have a cholesterol test. Have this test more often if you are at risk for high cholesterol or heart disease.     Every 10 years, have a colonoscopy. Or, have a yearly FIT test (stool test). These exams will check for colon cancer.    Talk to with your health care provider about screening for Abdominal Aortic Aneurysm if you have a family history of AAA or have a history of smoking.  Shots:     Get a flu shot each year.     Get a tetanus shot every 10 years.     Talk to your doctor about your pneumonia vaccines. There are now two you should receive - Pneumovax (PPSV 23) and Prevnar (PCV 13).    Talk to your doctor about a shingles vaccine.     Talk to your doctor about the hepatitis B vaccine.  Nutrition:     Eat at least 5 servings of fruits and vegetables each day.     Eat whole-grain bread, whole-wheat pasta and brown rice instead of white grains and rice.     Talk to your doctor about Calcium and Vitamin D.   Lifestyle    Exercise for at least 150 minutes a week (30 minutes a day, 5 days a week). This will help you control your weight and prevent disease.     Limit alcohol to one drink per day.     No smoking.     Wear sunscreen to prevent skin cancer.     See your dentist every six months for an exam and cleaning.     See your eye doctor every 1 to 2 years to screen for conditions such as glaucoma,  macular degeneration and cataracts.

## 2018-06-08 NOTE — PROGRESS NOTES
SUBJECTIVE:   Cornel Yuen is a 67 year old male who presents for Preventive Visit.    Are you in the first 12 months of your Medicare Part B coverage?  Yes,  Visual Acuity:  Right Eye: 20/40   Left Eye: 20/40  Both Eyes: 20/20    Healthy Habits:    Do you get at least three servings of calcium containing foods daily (dairy, green leafy vegetables, etc.)? yes    Amount of exercise or daily activities, outside of work: 5-6 day(s) per week    Problems taking medications regularly No    Medication side effects: No    Have you had an eye exam in the past two years? no    Do you see a dentist twice per year? yes    Do you have sleep apnea, excessive snoring or daytime drowsiness?no      Ability to successfully perform activities of daily living: Yes, no assistance needed    Home safety:  none identified     Hearing impairment: Yes, 70% in right ear and 20-30% loss in left, has hearing aid    Fall risk:  Fallen 2 or more times in the past year?: No  Any fall with injury in the past year?: No    Reports low libido. He has tried viagra and cialis for his ED but has side effects.  He has headaches, dreams and backache on cialis.  He reports decrease sex drive too.  Has not had testosterone checked.      COGNITIVE SCREEN  1) Repeat 3 items (Banana, Sunrise, Chair)    2) Clock draw: NORMAL  3) 3 item recall: Recalls 2 objects   Results: NORMAL clock, 1-2 items recalled: COGNITIVE IMPAIRMENT LESS LIKELY    Mini-CogTM Copyright MISAEL Oropeza. Licensed by the author for use in [facility]; reprinted with permission (jordan@.[facility]). All rights reserved.                Reviewed and updated as needed this visit by clinical staff  Tobacco  Allergies  Meds  Problems  Med Hx  Surg Hx  Fam Hx  Soc Hx          Reviewed and updated as needed this visit by Provider  Allergies  Meds  Problems        Social History   Substance Use Topics     Smoking status: Former Smoker     Smokeless tobacco: Never Used     Alcohol use  Yes      Comment: 2-3 scotch per night       If you drink alcohol do you typically have >3 drinks per day or >7 drinks per week? Yes - AUDIT SCORE:  10  AUDIT - Alcohol Use Disorders Identification Test - Reproduced from the World Health Organization Audit 2001 (Second Edition) 6/8/2018   1.  How often do you have a drink containing alcohol? 4 or more times a week   2.  How many drinks containing alcohol do you have on a typical day when you are drinking? 3 or 4   3.  How often do you have five or more drinks on one occasion? Less than monthly   4.  How often during the last year have you found that you were not able to stop drinking once you had started? Never   5.  How often during the last year have you failed to do what was normally expected of you because of drinking? Never   6.  How often during the last year have you needed a first drink in the morning to get yourself going after a heavy drinking session? Never   7.  How often during the last year have you had a feeling of guilt or remorse after drinking? Never   8.  How often during the last year have you been unable to remember what happened the night before because of your drinking? Never   9.  Have you or someone else been injured because of your drinking? No   10. Has a relative, friend, doctor or other health care worker been concerned about your drinking or suggested you cut down? Yes, during the last year   TOTAL SCORE 10                           Today's PHQ-2 Score:   PHQ-2 ( 1999 Pf[employer]) 5/9/2017 2/24/2016   Q1: Little interest or pleasure in doing things 0 0   Q2: Feeling down, depressed or hopeless 0 0   PHQ-2 Score 0 0       Do you feel safe in your environment - Yes    Do you have a Health Care Directive?: No: Advance care planning reviewed with patient; information given to patient to review.    Current providers sharing in care for this patient include:   Patient Care Team:  Noah Brumfield MD as PCP - General    The following health  "maintenance items are reviewed in Epic and correct as of today:  Health Maintenance   Topic Date Due     HIV SCREEN (SYSTEM ASSIGNED)  07/17/1970     HEPATITIS C SCREENING  07/17/1970     ADVANCE DIRECTIVE PLANNING Q5 YRS  05/30/2017     FALL RISK ASSESSMENT  07/17/2017     PNEUMOCOCCAL (1 of 2 - PCV13) 07/17/2017     AORTIC ANEURYSM SCREENING (SYSTEM ASSIGNED)  07/17/2017     COLONOSCOPY Q10 YR  02/19/2020     TETANUS IMMUNIZATION (SYSTEM ASSIGNED)  05/30/2022     LIPID SCREEN Q5 YR MALE (SYSTEM ASSIGNED)  06/06/2023     INFLUENZA VACCINE  Completed         Pneumonia Vaccine:Adults age 65+ who received Pneumovax (PPSV23) at 65 years or older: Should be given PCV13 > 1 year after their most recent PPSV23    ROS:  Review Of Systems  Skin: negative  Eyes: negative  Ears/Nose/Throat: negative  Respiratory: No shortness of breath, dyspnea on exertion, cough, or hemoptysis  Cardiovascular: negative  Gastrointestinal: negative  Genitourinary: as above  Musculoskeletal: negative  Neurologic: negative  Psychiatric: negative  Hematologic/Lymphatic/Immunologic: negative  Endocrine: negative      OBJECTIVE:   /80  Pulse 60  Temp 97.3  F (36.3  C) (Tympanic)  Ht 5' 9.25\" (1.759 m)  Wt 155 lb 12.8 oz (70.7 kg)  BMI 22.84 kg/m2 Estimated body mass index is 22.84 kg/(m^2) as calculated from the following:    Height as of this encounter: 5' 9.25\" (1.759 m).    Weight as of this encounter: 155 lb 12.8 oz (70.7 kg).  EXAM:   GENERAL: healthy, alert and no distress  EYES: Eyes grossly normal to inspection, PERRL and conjunctivae and sclerae normal  HENT: ear canals and TM's normal, nose and mouth without ulcers or lesions  NECK: no adenopathy, no asymmetry, masses, or scars and thyroid normal to palpation  RESP: lungs clear to auscultation - no rales, rhonchi or wheezes  CV: regular rate and rhythm, normal S1 S2, no S3 or S4, no murmur, click or rub, no peripheral edema and peripheral pulses strong  ABDOMEN: soft, " nontender, no hepatosplenomegaly, no masses and bowel sounds normal   (male): normal male genitalia without lesions or urethral discharge, no hernia  MS: no gross musculoskeletal defects noted, no edema  SKIN: no suspicious lesions or rashes  NEURO: Normal strength and tone, mentation intact and speech normal  PSYCH: mentation appears normal, affect normal/bright  LYMPH: anterior cervical: no adenopathy  posterior cervical: no adenopathy    ASSESSMENT / PLAN:   (Z00.00) Encounter for routine adult health examination without abnormal findings  (primary encounter diagnosis)  Comment: Discussed healthy lifestyle and preventative cares.    Plan:     (R68.82) Low libido  Comment: he will get lab done in the am someday  Plan: Testosterone, total            (N52.9) Erectile dysfunction, unspecified erectile dysfunction type  Comment: discussed option of levitra and if this does not work or have side effects, go to urology  Plan:     (I10) Essential hypertension, benign  Comment: controlled  Plan: Basic metabolic panel            (Z12.5) Screening for prostate cancer  Comment:   Plan: Prostate spec antigen screen            (Z23) Need for vaccination  Comment:   Plan: Pneumococcal vaccine 13 valent PCV13 IM         (Prevnar) [43083], 1st  Administration  [65340]            (I10) BENIGN HYPERTENSION  Comment: controlled  Plan: atenolol (TENORMIN) 25 MG tablet, valsartan         (DIOVAN) 160 MG tablet              End of Life Planning:  Patient currently has an advanced directive: No.  I have verified the patient's ablity to prepare an advanced directive/make health care decisions.  Literature was provided to assist patient in preparing an advanced directive.    COUNSELING:  Reviewed preventive health counseling, as reflected in patient instructions       Regular exercise       Healthy diet/nutrition       Vision screening       Hearing screening        Estimated body mass index is 22.84 kg/(m^2) as calculated from the  "following:    Height as of this encounter: 5' 9.25\" (1.759 m).    Weight as of this encounter: 155 lb 12.8 oz (70.7 kg).       reports that he has quit smoking. He has never used smokeless tobacco.      Appropriate preventive services were discussed with this patient, including applicable screening as appropriate for cardiovascular disease, diabetes, osteopenia/osteoporosis, and glaucoma.  As appropriate for age/gender, discussed screening for colorectal cancer, prostate cancer, breast cancer, and cervical cancer. Checklist reviewing preventive services available has been given to the patient.    Reviewed patients plan of care and provided an AVS. The Basic Care Plan (routine screening as documented in Health Maintenance) for Cornel meets the Care Plan requirement. This Care Plan has been established and reviewed with the Patient.    Counseling Resources:  ATP IV Guidelines  Pooled Cohorts Equation Calculator  Breast Cancer Risk Calculator  FRAX Risk Assessment  ICSI Preventive Guidelines  Dietary Guidelines for Americans, 2010  USDA's MyPlate  ASA Prophylaxis  Lung CA Screening    Noah Brumfield MD  [facility]  "

## 2018-06-09 ASSESSMENT — ANXIETY QUESTIONNAIRES: GAD7 TOTAL SCORE: 0

## 2018-06-09 ASSESSMENT — PATIENT HEALTH QUESTIONNAIRE - PHQ9: SUM OF ALL RESPONSES TO PHQ QUESTIONS 1-9: 0

## 2018-07-02 DIAGNOSIS — R68.82 LOW LIBIDO: ICD-10-CM

## 2018-07-02 DIAGNOSIS — I10 ESSENTIAL HYPERTENSION, BENIGN: ICD-10-CM

## 2018-07-02 DIAGNOSIS — Z12.5 SCREENING FOR PROSTATE CANCER: ICD-10-CM

## 2018-07-02 LAB
ANION GAP SERPL CALCULATED.3IONS-SCNC: 8 MMOL/L (ref 3–14)
BUN SERPL-MCNC: 19 MG/DL (ref 7–30)
CALCIUM SERPL-MCNC: 9 MG/DL (ref 8.5–10.1)
CHLORIDE SERPL-SCNC: 104 MMOL/L (ref 94–109)
CO2 SERPL-SCNC: 27 MMOL/L (ref 20–32)
CREAT SERPL-MCNC: 0.93 MG/DL (ref 0.66–1.25)
GFR SERPL CREATININE-BSD FRML MDRD: 82 ML/MIN/1.7M2
GLUCOSE SERPL-MCNC: 103 MG/DL (ref 70–99)
POTASSIUM SERPL-SCNC: 4.1 MMOL/L (ref 3.4–5.3)
PSA SERPL-ACNC: 0.68 UG/L (ref 0–4)
SODIUM SERPL-SCNC: 139 MMOL/L (ref 133–144)

## 2018-07-02 PROCEDURE — 80048 BASIC METABOLIC PNL TOTAL CA: CPT | Performed by: FAMILY MEDICINE

## 2018-07-02 PROCEDURE — G0103 PSA SCREENING: HCPCS | Performed by: FAMILY MEDICINE

## 2018-07-02 PROCEDURE — 36415 COLL VENOUS BLD VENIPUNCTURE: CPT | Performed by: FAMILY MEDICINE

## 2018-07-02 PROCEDURE — 84403 ASSAY OF TOTAL TESTOSTERONE: CPT | Performed by: FAMILY MEDICINE

## 2018-07-03 LAB — TESTOST SERPL-MCNC: 393 NG/DL (ref 240–950)

## 2018-09-17 ENCOUNTER — TELEPHONE (OUTPATIENT)
Dept: FAMILY MEDICINE | Facility: CLINIC | Age: 68
End: 2018-09-17

## 2018-09-17 DIAGNOSIS — I10 ESSENTIAL HYPERTENSION, BENIGN: Primary | ICD-10-CM

## 2018-09-17 NOTE — TELEPHONE ENCOUNTER
S-(situation): Medication change    B-(background): LOV 06/08/18    A-(assessment): Currently on valsartan and needs to be changed as it is no longer available:    valsartan (DIOVAN) 160 MG tablet 180 tablet 3 6/8/2018  No   Sig - Route: Take 2 tablets (320 mg) by mouth daily He did not tolerate the other ARBs, they were tried before and he ended up on valsartan. - Oral     Also taking:   atenolol (TENORMIN) 25 MG tablet 90 tablet 3 6/8/2018  No   Sig - Route: Take 1 tablet (25 mg) by mouth daily - Oral    and glucosamine and 81 mg aspirin.    R-(recommendations): Routing to provider for review.     Odalis LOVE RN

## 2018-09-17 NOTE — TELEPHONE ENCOUNTER
Reason for Call:  Other med question    Detailed comments: Patient states he is on Valsartan and it is no longer available.  Change to something else?    Phone Number Patient can be reached at: Home number on file [phone] (home)    Best Time: any    Can we leave a detailed message on this number? YES    Call taken on 9/17/2018 at 1:35 PM by Damari Dunaway

## 2018-09-19 RX ORDER — OLMESARTAN MEDOXOMIL 40 MG/1
40 TABLET ORAL DAILY
Qty: 30 TABLET | Refills: 1 | Status: SHIPPED | OUTPATIENT
Start: 2018-09-19 | End: 2018-11-23

## 2018-09-19 NOTE — TELEPHONE ENCOUNTER
I have changed his medication and translated the dose to a different medication in the same family.    It is called olmesartan/Benicar 40 mg once a day which is the similar dose he was on for the valsartan.    I sent one month to try to make sure he has no side effects before refilling 3 months at a time.    Noah Brumfield MD  Family Medicine

## 2018-10-02 DIAGNOSIS — I10 ESSENTIAL HYPERTENSION, BENIGN: ICD-10-CM

## 2018-10-02 RX ORDER — ATENOLOL 25 MG/1
25 TABLET ORAL DAILY
Qty: 90 TABLET | Refills: 3 | OUTPATIENT
Start: 2018-10-02

## 2018-10-02 NOTE — TELEPHONE ENCOUNTER
"Requested Prescriptions   Pending Prescriptions Disp Refills     atenolol (TENORMIN) 25 MG tablet 90 tablet 3     Sig: Take 1 tablet (25 mg) by mouth daily    Beta-Blockers Protocol Passed    10/2/2018  1:12 PM       Passed - Blood pressure under 140/90 in past 12 months    BP Readings from Last 3 Encounters:   06/08/18 132/80   02/19/18 137/77   05/09/17 136/78                Passed - Patient is age 6 or older       Passed - Recent (12 mo) or future (30 days) visit within the authorizing provider's specialty    Patient had office visit in the last 12 months or has a visit in the next 30 days with authorizing provider or within the authorizing provider's specialty.  See \"Patient Info\" tab in inbasket, or \"Choose Columns\" in Meds & Orders section of the refill encounter.            Last Written Prescription Date:  6/8/18  Last Fill Quantity: 90,  # refills: 3   Last office visit: 6/8/2018 with prescribing provider:  Octaviano   Future Office Visit:      "

## 2018-10-02 NOTE — TELEPHONE ENCOUNTER
Duplicate  Medication Detail      Disp Refills Start End BENJI   atenolol (TENORMIN) 25 MG tablet 90 tablet 3 6/8/2018  No   Sig - Route: Take 1 tablet (25 mg) by mouth daily - Oral   Class: E-Prescribe   Order: [id number]   E-Prescribing Status: Receipt confirmed by pharmacy (6/8/2018 11:49 AM CDT)     Marianne LOCKETT RN

## 2018-11-12 ENCOUNTER — TELEPHONE (OUTPATIENT)
Dept: FAMILY MEDICINE | Facility: CLINIC | Age: 68
End: 2018-11-12

## 2018-11-12 NOTE — TELEPHONE ENCOUNTER
Reason for Call:  Other med request    Detailed comments: Patient states he was taking olmesartan 40 mg and it gave him terrible stomach cramps. He stopped the med,  cramps have gone away. He is asking for a different med.    Phone Number Patient can be reached at: Home number on file [phone] (home)    Best Time: any    Can we leave a detailed message on this number? YES    Call taken on 11/12/2018 at 3:08 PM by Damari Dunaway

## 2018-11-13 NOTE — TELEPHONE ENCOUNTER
Attempted to call the patient and got his wife.  She is going to call the patient on his cell and let him know we called.  Patient is suppose to be here in the  building now and he could stop by and the wife also wants him to do the consent to communicate form while her for her. Anne-Marie GREENWOOD RN

## 2018-11-13 NOTE — TELEPHONE ENCOUNTER
Patient returning phone call to clinic  Patient reports that his stomach cramps have stopped since he stopped taking olmesartan  He has been taking his BP every other day, and takes his atenolol daily  He denies signs/symptoms of hypertension  Patient has appt with PCP to follow up  He is requesting if he should take an alternate medication or wait until OV to address  Please advise    Routing to provider.    Sapna SWAIN Rn

## 2018-11-14 NOTE — TELEPHONE ENCOUNTER
Plan on follow up in clinic to discuss and check his blood pressure before trying a different medication.  Sincerely,  Noah Brumfield MD

## 2018-11-23 ENCOUNTER — OFFICE VISIT (OUTPATIENT)
Dept: FAMILY MEDICINE | Facility: CLINIC | Age: 68
End: 2018-11-23
Payer: COMMERCIAL

## 2018-11-23 VITALS
BODY MASS INDEX: 23.43 KG/M2 | DIASTOLIC BLOOD PRESSURE: 90 MMHG | HEART RATE: 62 BPM | TEMPERATURE: 98.1 F | WEIGHT: 158.2 LBS | HEIGHT: 69 IN | SYSTOLIC BLOOD PRESSURE: 150 MMHG | OXYGEN SATURATION: 98 %

## 2018-11-23 DIAGNOSIS — Z23 NEED FOR PROPHYLACTIC VACCINATION AND INOCULATION AGAINST INFLUENZA: ICD-10-CM

## 2018-11-23 DIAGNOSIS — I10 ESSENTIAL HYPERTENSION, BENIGN: Primary | ICD-10-CM

## 2018-11-23 DIAGNOSIS — T88.7XXA MEDICATION SIDE EFFECTS: ICD-10-CM

## 2018-11-23 PROCEDURE — 90471 IMMUNIZATION ADMIN: CPT | Performed by: FAMILY MEDICINE

## 2018-11-23 PROCEDURE — 90662 IIV NO PRSV INCREASED AG IM: CPT | Performed by: FAMILY MEDICINE

## 2018-11-23 PROCEDURE — 99213 OFFICE O/P EST LOW 20 MIN: CPT | Mod: 25 | Performed by: FAMILY MEDICINE

## 2018-11-23 RX ORDER — LOSARTAN POTASSIUM 25 MG/1
12.5 TABLET ORAL DAILY
Qty: 30 TABLET | Refills: 3 | Status: SHIPPED | OUTPATIENT
Start: 2018-11-23 | End: 2018-12-14

## 2018-11-23 NOTE — MR AVS SNAPSHOT
After Visit Summary   11/23/2018    Cornel Yuen    MRN: [id number]           Patient Information     Date Of Birth          1950        Visit Information        Provider Department      11/23/2018 11:00 AM Noah Brumfield MD [facility]        Today's Diagnoses     Essential hypertension, benign    -  1    Need for prophylactic vaccination and inoculation against influenza          Care Instructions    Please start the losartan medication again.  You were on this in the past for a few years before stopping the medication.    I am going to restart the medication however at 12.5 mg a day of losartan.    Please make a nurse visit in 3 weeks to recheck your blood pressure.    If you have a side effect please call me and I will stop the medication and go to Atacand 8 mg a day as a trial.    I am not increasing your atenolol due to your lower pulse rate at this time, however if we need to we could do a trial with a higher dose.      Thank you for choosing [facility].  You may be receiving a survey in the mail from [employer] regarding your visit today.  Please take a few minutes to complete and return the survey to let us know how we are doing.      If you have questions or concerns, please contact us via [employer] or you can contact your care team at [phone].    Our Clinic hours are:  Monday 6:40 am  to 7:00 pm  Tuesday -Friday 6:40 am to 5:00 pm    The [place] outpatient lab hours are:  Monday - Friday 6:10 am to 4:45 pm  Saturdays 7:00 am to 11:00 am  Appointments are required, call [phone]    If you have clinical questions after hours or would like to schedule an appointment,  call the clinic at [phone].            Follow-ups after your visit        Your next 10 appointments already scheduled     Jan 08, 2019  9:00 AM CST   Return Visit with Edin De La Cruz MD   [facility]    [address]  "[place] [address]   [phone]              Who to contact     If you have questions or need follow up information about today's clinic visit or your schedule please contact [facility] directly at [phone].  Normal or non-critical lab and imaging results will be communicated to you by MyChart, letter or phone within 4 business days after the clinic has received the results. If you do not hear from us within 7 days, please contact the clinic through MyChart or phone. If you have a critical or abnormal lab result, we will notify you by phone as soon as possible.  Submit refill requests through [employer] or call your pharmacy and they will forward the refill request to us. Please allow 3 business days for your refill to be completed.          Additional Information About Your Visit        Care EveryWhere ID     This is your Care EveryWhere ID. This could be used by other organizations to access your [facility] medical records  [id number]        Your Vitals Were     Pulse Temperature Height Pulse Oximetry BMI (Body Mass Index)       62 98.1  F (36.7  C) (Tympanic) 5' 9.25\" (1.759 m) 98% 23.19 kg/m2        Blood Pressure from Last 3 Encounters:   11/23/18 150/90   06/08/18 132/80   02/19/18 137/77    Weight from Last 3 Encounters:   11/23/18 158 lb 3.2 oz (71.8 kg)   06/08/18 155 lb 12.8 oz (70.7 kg)   02/24/16 160 lb (72.6 kg)              We Performed the Following     FLU VACCINE, INCREASED ANTIGEN, PRESV FREE, AGE 65+ [96111]     Vaccine Administration, Initial [95165]          Today's Medication Changes          These changes are accurate as of 11/23/18 11:27 AM.  If you have any questions, ask your nurse or doctor.               Start taking these medicines.        Dose/Directions    losartan 25 MG tablet   Commonly known as:  COZAAR   Used for:  Essential hypertension, benign   Started by:  Noah Brumfield MD        Dose:  12.5 mg   Take 0.5 tablets (12.5 mg) by mouth daily   Quantity:  30 tablet "   Refills:  3            Where to get your medicines      These medications were sent to [place] [facility] [place] - [address] [address]  [address] [address]     Phone:  [phone]     losartan 25 MG tablet                Primary Care Provider Office Phone # Fax #    Noah Brumfield [phone] [address] [address]        Equal Access to Services     JOSE AZAR : Hadii aad ku hadasho Soomaali, waaxda luqadaha, qaybta kaalmada adeegyada, waxay idiin hayaan adeeg kharash lanadine . So [facility] [phone].    ATENCIÓN: Si habla español, tiene a amor disposición servicios gratuitos de asistencia lingüística. [facility] [phone].    We comply with applicable federal civil rights laws and [place] laws. We do not discriminate on the basis of race, color, national origin, age, disability, sex, sexual orientation, or gender identity.            Thank you!     Thank you for choosing [facility]  for your care. Our goal is always to provide you with excellent care. Hearing back from our patients is one way we can continue to improve our services. Please take a few minutes to complete the written survey that you may receive in the mail after your visit with us. Thank you!             Your Updated Medication List - Protect others around you: Learn how to safely use, store and throw away your medicines at www.disposemymeds.org.          This list is accurate as of 11/23/18 11:27 AM.  Always use your most recent med list.                   Brand Name Dispense Instructions for use Diagnosis    aspirin 81 MG tablet    ASA     1 TABLET DAILY        atenolol 25 MG tablet    TENORMIN    90 tablet    Take 1 tablet (25 mg) by mouth daily    Essential hypertension, benign       glucosamine-chondroitinoitin 750-600 MG Tabs     0    1 tab daily    Knee pain       losartan 25 MG tablet    COZAAR    30 tablet    Take 0.5 tablets (12.5 mg) by mouth daily     Essential hypertension, benign

## 2018-11-23 NOTE — PATIENT INSTRUCTIONS
Please start the losartan medication again.  You were on this in the past for a few years before stopping the medication.    I am going to restart the medication however at 12.5 mg a day of losartan.    Please make a nurse visit in 3 weeks to recheck your blood pressure.    If you have a side effect please call me and I will stop the medication and go to Atacand 8 mg a day as a trial.    I am not increasing your atenolol due to your lower pulse rate at this time, however if we need to we could do a trial with a higher dose.      Thank you for choosing [facility].  You may be receiving a survey in the mail from [employer] regarding your visit today.  Please take a few minutes to complete and return the survey to let us know how we are doing.      If you have questions or concerns, please contact us via [employer] or you can contact your care team at [phone].    Our Clinic hours are:  Monday 6:40 am  to 7:00 pm  Tuesday -Friday 6:40 am to 5:00 pm    The [place] outpatient lab hours are:  Monday - Friday 6:10 am to 4:45 pm  Saturdays 7:00 am to 11:00 am  Appointments are required, call [phone]    If you have clinical questions after hours or would like to schedule an appointment,  call the clinic at [phone].

## 2018-11-23 NOTE — PROGRESS NOTES
"  SUBJECTIVE:   Cornel Yuen is a 68 year old male who presents to clinic today for the following health issues:    Hypertension Follow-up - had side effects from olmesartan       Outpatient blood pressures are being checked at home.  Results are 130s/70s.    Low Salt Diet: no added salt      Amount of exercise or physical activity: None    Problems taking medications regularly: Yes,  side effects    Medication side effects: Stomach Cramping when he started taking Olmesartan. He has stopped this medication now and the cramps have gone away.     Diet: regular (no restrictions)    He is feeling fine.  He was on losartan in the past.  He had side effects of light headedness. 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          Problem list and histories reviewed & adjusted, as indicated.  Additional history: as documented        Reviewed and updated as needed this visit by clinical staff  Allergies       Reviewed and updated as needed this visit by Provider         ROS:  CONSTITUTIONAL:NEGATIVE for fever, chills, change in weight  INTEGUMENTARY/SKIN: NEGATIVE for worrisome rashes, moles or lesions  RESP:NEGATIVE for significant cough or SOB  CV: NEGATIVE for chest pain, palpitations or peripheral edema  MUSCULOSKELETAL: NEGATIVE for significant arthralgias or myalgia    OBJECTIVE:                                                    /90 (BP Location: Right arm, Patient Position: Sitting, Cuff Size: Adult Regular)  Pulse 62  Temp 98.1  F (36.7  C) (Tympanic)  Ht 5' 9.25\" (1.759 m)  Wt 158 lb 3.2 oz (71.8 kg)  SpO2 98%  BMI 23.19 kg/m2  Body mass index is 23.19 kg/(m^2).  GENERAL APPEARANCE: healthy, alert and no distress  MS: extremities normal- no gross deformities noted  SKIN: no suspicious lesions or rashes  NEURO: Normal strength and tone, mentation intact and speech normal  PSYCH: mentation appears normal and affect normal/bright     "     ASSESSMENT/PLAN:                                                    1. Essential hypertension, benign  Not controlled, will restart losartan at lower dose to see how this works.  Nurse bp visit in 3 weeks.  Plan is explained in patient information  - losartan (COZAAR) 25 MG tablet; Take 0.5 tablets (12.5 mg) by mouth daily  Dispense: 30 tablet; Refill: 3    2. Need for prophylactic vaccination and inoculation against influenza    - FLU VACCINE, INCREASED ANTIGEN, PRESV FREE, AGE 65+ [16288]  - Vaccine Administration, Initial [07840]    Changing medications due to side effects and trying an old one which seemed to work better.  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      See Patient Instructions    Noah Brumfield MD  [facility]      Injectable Influenza Immunization Documentation    1.  Is the person to be vaccinated sick today?   No    2. Does the person to be vaccinated have an allergy to a component   of the vaccine?   No  Egg Allergy Algorithm Link    3. Has the person to be vaccinated ever had a serious reaction   to influenza vaccine in the past?   No    4. Has the person to be vaccinated ever had Guillain-Barré syndrome?   No    Form completed by Socorro Ferrer CMA (AAMA)

## 2018-11-23 NOTE — NURSING NOTE
"Initial /90 (BP Location: Right arm, Patient Position: Sitting, Cuff Size: Adult Regular)  Pulse 62  Temp 98.1  F (36.7  C) (Tympanic)  Ht 5' 9.25\" (1.759 m)  Wt 158 lb 3.2 oz (71.8 kg)  SpO2 98%  BMI 23.19 kg/m2 Estimated body mass index is 23.19 kg/(m^2) as calculated from the following:    Height as of this encounter: 5' 9.25\" (1.759 m).    Weight as of this encounter: 158 lb 3.2 oz (71.8 kg). .    Socorro Ferrer CMA ([facility])  "

## 2018-12-13 NOTE — PROGRESS NOTES
S-(situation): BP Check    B-(background): 11/23/18: Octaviano 1. Essential hypertension, benign  Not controlled, will restart losartan at lower dose to see how this works.  Nurse bp visit in 3 weeks.  Plan is explained in patient information  - losartan (COZAAR) 25 MG tablet; Take 0.5 tablets (12.5 mg) by mouth daily  Dispense: 30 tablet; Refill: 3    A-(assessment):   Vital Signs 12/14/2018 12/14/2018   Systolic 148 132   Diastolic 78 76   Pulse 70 67   Manual    Home BP averaging 130/70s.   No side effects reported at time of visit.     Pharmacy: [facility] [place]    R-(recommendations): Routing to provider for recommendation.     RENAN RomanN, RN

## 2018-12-14 ENCOUNTER — TELEPHONE (OUTPATIENT)
Dept: FAMILY MEDICINE | Facility: CLINIC | Age: 68
End: 2018-12-14

## 2018-12-14 ENCOUNTER — ALLIED HEALTH/NURSE VISIT (OUTPATIENT)
Dept: FAMILY MEDICINE | Facility: CLINIC | Age: 68
End: 2018-12-14
Payer: COMMERCIAL

## 2018-12-14 VITALS — SYSTOLIC BLOOD PRESSURE: 132 MMHG | DIASTOLIC BLOOD PRESSURE: 76 MMHG | HEART RATE: 67 BPM

## 2018-12-14 DIAGNOSIS — I10 ESSENTIAL HYPERTENSION, BENIGN: ICD-10-CM

## 2018-12-14 DIAGNOSIS — Z01.30 BP CHECK: Primary | ICD-10-CM

## 2018-12-14 PROCEDURE — 99207 ZZC NO CHARGE NURSE ONLY: CPT

## 2018-12-14 RX ORDER — LOSARTAN POTASSIUM 25 MG/1
12.5 TABLET ORAL DAILY
Qty: 45 TABLET | Refills: 3 | Status: SHIPPED | OUTPATIENT
Start: 2018-12-14 | End: 2019-10-09

## 2018-12-14 NOTE — TELEPHONE ENCOUNTER
S-(situation): BP Check    B-(background): 11/23/18: Octaviano 1. Essential hypertension, benign  Not controlled, will restart losartan at lower dose to see how this works.  Nurse bp visit in 3 weeks.  Plan is explained in patient information  - losartan (COZAAR) 25 MG tablet; Take 0.5 tablets (12.5 mg) by mouth daily  Dispense: 30 tablet; Refill: 3    A-(assessment):   Vital Signs 12/14/2018 12/14/2018   Systolic 148 132   Diastolic 78 76   Pulse 70 67   Manual    Home BP averaging 130/70s.   No side effects reported at time of visit.     Pharmacy: [facility]    R-(recommendations): Routing to provider for recommendation.     RENAN RomanN, RN

## 2018-12-14 NOTE — TELEPHONE ENCOUNTER
Please call patient,  His blood pressure is controlled.  I refilled his medication for the year.  No change in the losartan dose.  Noah Brumfield MD  Family Medicine

## 2019-01-08 ENCOUNTER — OFFICE VISIT (OUTPATIENT)
Dept: DERMATOLOGY | Facility: CLINIC | Age: 69
End: 2019-01-08
Payer: COMMERCIAL

## 2019-01-08 VITALS — OXYGEN SATURATION: 100 % | HEART RATE: 67 BPM | SYSTOLIC BLOOD PRESSURE: 159 MMHG | DIASTOLIC BLOOD PRESSURE: 94 MMHG

## 2019-01-08 DIAGNOSIS — L82.1 SEBORRHEIC KERATOSIS: ICD-10-CM

## 2019-01-08 DIAGNOSIS — D23.9 DERMAL NEVUS: ICD-10-CM

## 2019-01-08 DIAGNOSIS — D18.00 ANGIOMA: ICD-10-CM

## 2019-01-08 DIAGNOSIS — L81.4 LENTIGO: ICD-10-CM

## 2019-01-08 DIAGNOSIS — L57.0 AK (ACTINIC KERATOSIS): Primary | ICD-10-CM

## 2019-01-08 DIAGNOSIS — Z85.828 HISTORY OF SKIN CANCER: ICD-10-CM

## 2019-01-08 PROCEDURE — 99213 OFFICE O/P EST LOW 20 MIN: CPT | Performed by: DERMATOLOGY

## 2019-01-08 NOTE — LETTER
1/8/2019         RE: Cornel Yuen  [address]  [place] [address]        Dear Colleague,    Thank you for referring your patient, Cornel Yuen, to the [facility]. Please see a copy of my visit note below.    Cornel Yuen is a 68 year old year old male patient here today for f/u hx of non-melanoma skin cancer.  He denies any new or changing skin lesions.  Patient reports the following modifying factors none.  Associated symptoms: none.  Patient has no other skin complaints today.  Remainder of the HPI, Meds, PMH, Allergies, FH, and SH was reviewed in chart.      Past Medical History:   Diagnosis Date     Actinic keratosis      Basal cell carcinoma      Essential hypertension, benign      Squamous cell carcinoma        Past Surgical History:   Procedure Laterality Date     C ANESTH,KNEE JOINT; NOS       HC REPAIR OF NASAL SEPTUM       HERNIA REPAIR, INGUINAL RT/LT       TONSILLECTOMY & ADENOIDECTOMY          Family History   Problem Relation Age of Onset     Cancer Father         brain tumor     Diabetes Maternal Grandmother      Alzheimer Disease Maternal Grandmother      C.A.D. Maternal Grandfather      Alzheimer Disease Maternal Grandfather      Unknown/Adopted Paternal Grandmother      C.A.D. Paternal Grandfather      Neurologic Disorder Mother         memory issues     Alzheimer Disease Mother      Asthma Daughter      Melanoma No family hx of        Social History     Socioeconomic History     Marital status:      Spouse name: Not on file     Number of children: Not on file     Years of education: Not on file     Highest education level: Not on file   Social Needs     Financial resource strain: Not on file     Food insecurity - worry: Not on file     Food insecurity - inability: Not on file     Transportation needs - medical: Not on file     Transportation needs - non-medical: Not on file   Occupational History     Not on file   Tobacco Use     Smoking status: Former  Smoker     Smokeless tobacco: Never Used   Substance and Sexual Activity     Alcohol use: Yes     Comment: 2-3 scotch per night     Drug use: Yes     Comment: occasional- marijuana     Sexual activity: Yes     Partners: Female     Birth control/protection: Surgical     Comment: vasectomy   Other Topics Concern     Parent/sibling w/ CABG, MI or angioplasty before 65F 55M? No   Social History Narrative     Not on file       Outpatient Encounter Medications as of 1/8/2019   Medication Sig Dispense Refill     ASPIRIN 81 MG OR TABS 1 TABLET DAILY       atenolol (TENORMIN) 25 MG tablet Take 1 tablet (25 mg) by mouth daily 90 tablet 3     GLUCOSAMINE-CHONDROITIN 750-600 MG OR TABS 1 tab daily 0 0     losartan (COZAAR) 25 MG tablet Take 0.5 tablets (12.5 mg) by mouth daily Hold on file until needed 45 tablet 3     No facility-administered encounter medications on file as of 1/8/2019.              Review Of Systems  Skin: As above  Eyes: negative  Ears/Nose/Throat: negative  Respiratory: No shortness of breath, dyspnea on exertion, cough, or hemoptysis  Cardiovascular: negative  Gastrointestinal: negative  Genitourinary: negative  Musculoskeletal: negative  Neurologic: negative  Psychiatric: negative  Hematologic/Lymphatic/Immunologic: negative  Endocrine: negative      O:   NAD, WDWN, Alert & Oriented, Mood & Affect wnl, Vitals stable   Here today alone   BP (!) 159/94   Pulse 67   SpO2 100%    General appearance normal   Vitals stable   Alert, oriented and in no acute distress      Following lymph nodes palpated: Occipital, Cervical, Supraclavicular no lad   BL hands grityt papules       Stuck on papules and brown macules on trunk and ext   Red papules on trunk  Flesh colored papules on trunk     The remainder of the full exam was unremarkable; the following areas were examined:  conjunctiva/lids, oral mucosa, neck, peripheral vascular system, abdomen, lymph nodes, digits/nails, eccrine and apocrine glands, scalp/hair,  face, neck, chest, abdomen, buttocks, back, RUE, LUE, RLE, LLE       Eyes: Conjunctivae/lids:Normal     ENT: Lips, buccal mucosa, tongue: normal    MSK:Normal    Cardiovascular: peripheral edema none    Pulm: Breathing Normal    Lymph Nodes: No Head and Neck Lymphadenopathy     Neuro/Psych: Orientation:Normal; Mood/Affect:Normal      A/P:  1. Seborrheic keratosis, lentigo, angioma, dermal nevus, hx of non-melanoma skin cancer  2. Actinic keratosis   He declines treatent  Pathophysiology discussed with pateint   BENIGN LESIONS DISCUSSED WITH PATIENT:  I discussed the specifics of tumor, prognosis, and genetics of benign lesions.  I explained that treatment of these lesions would be purely cosmetic and not medically neccessary.  I discussed with patient different removal options including excision, cautery and /or laser.      Nature and genetics of benign skin lesions dicussed with patient.  Signs and Symptoms of skin cancer discussed with patient.  ABCDEs of melanoma reviewed with patient.  Patient encouraged to perform monthly skin exams.  UV precautions reviewed with patient.  Patient to follow up with Primary Care provider regarding elevated blood pressure.  Skin care regimen reviewed with patient: Eliminate harsh soaps, i.e. Dial, zest, irsih spring; Mild soaps such as Cetaphil or Dove sensitive skin, avoid hot or cold showers, aggressive use of emollients including vanicream, cetaphil or cerave discussed with patient.    Risks of non-melanoma skin cancer discussed with patient   Return to clinic 12 months      Again, thank you for allowing me to participate in the care of your patient.        Sincerely,        Edin De La Cruz MD

## 2019-01-08 NOTE — PROGRESS NOTES
Cornel Yuen is a 68 year old year old male patient here today for f/u hx of non-melanoma skin cancer.  He denies any new or changing skin lesions.  Patient reports the following modifying factors none.  Associated symptoms: none.  Patient has no other skin complaints today.  Remainder of the HPI, Meds, PMH, Allergies, FH, and SH was reviewed in chart.      Past Medical History:   Diagnosis Date     Actinic keratosis      Basal cell carcinoma      Essential hypertension, benign      Squamous cell carcinoma        Past Surgical History:   Procedure Laterality Date     C ANESTH,KNEE JOINT; NOS       HC REPAIR OF NASAL SEPTUM       HERNIA REPAIR, INGUINAL RT/LT       TONSILLECTOMY & ADENOIDECTOMY          Family History   Problem Relation Age of Onset     Cancer Father         brain tumor     Diabetes Maternal Grandmother      Alzheimer Disease Maternal Grandmother      C.A.D. Maternal Grandfather      Alzheimer Disease Maternal Grandfather      Unknown/Adopted Paternal Grandmother      C.A.D. Paternal Grandfather      Neurologic Disorder Mother         memory issues     Alzheimer Disease Mother      Asthma Daughter      Melanoma No family hx of        Social History     Socioeconomic History     Marital status:      Spouse name: Not on file     Number of children: Not on file     Years of education: Not on file     Highest education level: Not on file   Social Needs     Financial resource strain: Not on file     Food insecurity - worry: Not on file     Food insecurity - inability: Not on file     Transportation needs - medical: Not on file     Transportation needs - non-medical: Not on file   Occupational History     Not on file   Tobacco Use     Smoking status: Former Smoker     Smokeless tobacco: Never Used   Substance and Sexual Activity     Alcohol use: Yes     Comment: 2-3 scotch per night     Drug use: Yes     Comment: occasional- marijuana     Sexual activity: Yes     Partners: Female     Birth  control/protection: Surgical     Comment: vasectomy   Other Topics Concern     Parent/sibling w/ CABG, MI or angioplasty before 65F 55M? No   Social History Narrative     Not on file       Outpatient Encounter Medications as of 1/8/2019   Medication Sig Dispense Refill     ASPIRIN 81 MG OR TABS 1 TABLET DAILY       atenolol (TENORMIN) 25 MG tablet Take 1 tablet (25 mg) by mouth daily 90 tablet 3     GLUCOSAMINE-CHONDROITIN 750-600 MG OR TABS 1 tab daily 0 0     losartan (COZAAR) 25 MG tablet Take 0.5 tablets (12.5 mg) by mouth daily Hold on file until needed 45 tablet 3     No facility-administered encounter medications on file as of 1/8/2019.              Review Of Systems  Skin: As above  Eyes: negative  Ears/Nose/Throat: negative  Respiratory: No shortness of breath, dyspnea on exertion, cough, or hemoptysis  Cardiovascular: negative  Gastrointestinal: negative  Genitourinary: negative  Musculoskeletal: negative  Neurologic: negative  Psychiatric: negative  Hematologic/Lymphatic/Immunologic: negative  Endocrine: negative      O:   NAD, WDWN, Alert & Oriented, Mood & Affect wnl, Vitals stable   Here today alone   BP (!) 159/94   Pulse 67   SpO2 100%    General appearance normal   Vitals stable   Alert, oriented and in no acute distress      Following lymph nodes palpated: Occipital, Cervical, Supraclavicular no lad   BL hands grityt papules       Stuck on papules and brown macules on trunk and ext   Red papules on trunk  Flesh colored papules on trunk     The remainder of the full exam was unremarkable; the following areas were examined:  conjunctiva/lids, oral mucosa, neck, peripheral vascular system, abdomen, lymph nodes, digits/nails, eccrine and apocrine glands, scalp/hair, face, neck, chest, abdomen, buttocks, back, RUE, LUE, RLE, LLE       Eyes: Conjunctivae/lids:Normal     ENT: Lips, buccal mucosa, tongue: normal    MSK:Normal    Cardiovascular: peripheral edema none    Pulm: Breathing Normal    Lymph  Nodes: No Head and Neck Lymphadenopathy     Neuro/Psych: Orientation:Normal; Mood/Affect:Normal      A/P:  1. Seborrheic keratosis, lentigo, angioma, dermal nevus, hx of non-melanoma skin cancer  2. Actinic keratosis   He declines treatent  Pathophysiology discussed with pateint   BENIGN LESIONS DISCUSSED WITH PATIENT:  I discussed the specifics of tumor, prognosis, and genetics of benign lesions.  I explained that treatment of these lesions would be purely cosmetic and not medically neccessary.  I discussed with patient different removal options including excision, cautery and /or laser.      Nature and genetics of benign skin lesions dicussed with patient.  Signs and Symptoms of skin cancer discussed with patient.  ABCDEs of melanoma reviewed with patient.  Patient encouraged to perform monthly skin exams.  UV precautions reviewed with patient.  Patient to follow up with Primary Care provider regarding elevated blood pressure.  Skin care regimen reviewed with patient: Eliminate harsh soaps, i.e. Dial, zest, irsih spring; Mild soaps such as Cetaphil or Dove sensitive skin, avoid hot or cold showers, aggressive use of emollients including vanicream, cetaphil or cerave discussed with patient.    Risks of non-melanoma skin cancer discussed with patient   Return to clinic 12 months

## 2019-01-19 ENCOUNTER — NURSE TRIAGE (OUTPATIENT)
Dept: NURSING | Facility: CLINIC | Age: 69
End: 2019-01-19

## 2019-01-19 ENCOUNTER — HOSPITAL ENCOUNTER (EMERGENCY)
Facility: CLINIC | Age: 69
Discharge: HOME | End: 2019-01-19
Attending: PHYSICIAN ASSISTANT | Admitting: PHYSICIAN ASSISTANT
Payer: COMMERCIAL

## 2019-01-19 VITALS
DIASTOLIC BLOOD PRESSURE: 84 MMHG | SYSTOLIC BLOOD PRESSURE: 156 MMHG | BODY MASS INDEX: 22.72 KG/M2 | RESPIRATION RATE: 18 BRPM | OXYGEN SATURATION: 98 % | TEMPERATURE: 98.4 F | WEIGHT: 155 LBS | HEART RATE: 68 BPM

## 2019-01-19 DIAGNOSIS — K12.0 APHTHOUS ULCER: ICD-10-CM

## 2019-01-19 DIAGNOSIS — B34.9 VIRAL SYNDROME: ICD-10-CM

## 2019-01-19 LAB
INTERNAL QC OK POCT: YES
S PYO AG THROAT QL IA.RAPID: NEGATIVE

## 2019-01-19 PROCEDURE — 87880 STREP A ASSAY W/OPTIC: CPT | Performed by: PHYSICIAN ASSISTANT

## 2019-01-19 PROCEDURE — G0463 HOSPITAL OUTPT CLINIC VISIT: HCPCS | Performed by: NURSE PRACTITIONER

## 2019-01-19 PROCEDURE — 99214 OFFICE O/P EST MOD 30 MIN: CPT | Mod: Z6 | Performed by: NURSE PRACTITIONER

## 2019-01-19 PROCEDURE — 87081 CULTURE SCREEN ONLY: CPT | Performed by: NURSE PRACTITIONER

## 2019-01-19 RX ORDER — DIPHENHYDRAMINE HYDROCHLORIDE AND LIDOCAINE HYDROCHLORIDE AND ALUMINUM HYDROXIDE AND MAGNESIUM HYDRO
5-10 KIT EVERY 6 HOURS PRN
Qty: 119 ML | Refills: 0 | Status: SHIPPED | OUTPATIENT
Start: 2019-01-19 | End: 2019-02-18

## 2019-01-19 RX ORDER — DOXYCYCLINE 100 MG/1
100 CAPSULE ORAL 2 TIMES DAILY
Qty: 20 CAPSULE | Refills: 0 | Status: SHIPPED | OUTPATIENT
Start: 2019-01-19 | End: 2019-01-29

## 2019-01-19 RX ORDER — DIPHENHYDRAMINE HYDROCHLORIDE AND LIDOCAINE HYDROCHLORIDE AND ALUMINUM HYDROXIDE AND MAGNESIUM HYDRO
5-10 KIT EVERY 6 HOURS PRN
Qty: 119 ML | Refills: 0 | Status: SHIPPED | OUTPATIENT
Start: 2019-01-19 | End: 2019-01-19

## 2019-01-19 NOTE — ED PROVIDER NOTES
"  History     Chief Complaint   Patient presents with     Pharyngitis     HPI    SUBJECTIVE: Cornel Yuen  is here today because of:\"Cold\"  The patient has had symptoms of cough worse in am, earache, sore throat that has gotten worse over the past three days, nasal congestion/runny nose and painful swallowing which feels more swollen in the past couple of days, difficulty sleeping at night due to congestion and cough  Onset of symptoms was 7 days ago. Course of illness is worsening.  Patient admits to exposure to illness at home with wife and daughter.   Patient denies fever, nausea, vomiting, diarrhea, headache, sinus pain, chest congestion, wheezing, myalgias, itching in eyes and mattering conjuntivitis  Treatment measures tried include ibuprofen.  Patient is not a smoker    Allergies:  Allergies   Allergen Reactions     Olmesartan Cramps     Amoxicillin Itching     Lisinopril Cough     Efudex [Fluorouracil] Rash     Intense reaction, itchy, rash, noted by dermatologist. Told by dermatologist to stop taking the medication due to the reaction     Hctz [Hydrochlorothiazide] Rash     Has a mild red generalized rash     Norvasc [Amlodipine]      Swelling of feet       Problem List:    Patient Active Problem List    Diagnosis Date Noted     Mixed hearing loss, unilateral 12/11/2013     Priority: Medium     Advanced directives, counseling/discussion 05/30/2012     Priority: Medium     Patient states has Advance Directive and will bring in a copy to clinic. 5/30/2012         ED (erectile dysfunction) 05/30/2012     Priority: Medium     HYPERLIPIDEMIA LDL GOAL <130 10/31/2010     Priority: Medium     Impotence of organic origin 10/24/2006     Priority: Medium     due to htn medication       Essential hypertension, benign 01/03/2006     Priority: Medium     ESOPHAGEAL REFLUX 01/03/2006     Priority: Medium        Past Medical History:    Past Medical History:   Diagnosis Date     Actinic keratosis      Basal cell " carcinoma      Essential hypertension, benign      Squamous cell carcinoma        Past Surgical History:    Past Surgical History:   Procedure Laterality Date     C ANESTH,KNEE JOINT; NOS       HC REPAIR OF NASAL SEPTUM       HERNIA REPAIR, INGUINAL RT/LT       TONSILLECTOMY & ADENOIDECTOMY         Family History:    Family History   Problem Relation Age of Onset     Cancer Father         brain tumor     Diabetes Maternal Grandmother      Alzheimer Disease Maternal Grandmother      C.A.D. Maternal Grandfather      Alzheimer Disease Maternal Grandfather      Unknown/Adopted Paternal Grandmother      C.A.D. Paternal Grandfather      Neurologic Disorder Mother         memory issues     Alzheimer Disease Mother      Asthma Daughter      Melanoma No family hx of        Social History:  Marital Status:   [2]  Social History     Tobacco Use     Smoking status: Former Smoker     Smokeless tobacco: Never Used   Substance Use Topics     Alcohol use: Yes     Comment: 2-3 scotch per night     Drug use: Yes     Comment: occasional- marijuana        Medications:      doxycycline hyclate (VIBRAMYCIN) 100 MG capsule   magic mouthwash (FIRST-MOUTHWASH BLM) compounding kit   ASPIRIN 81 MG OR TABS   atenolol (TENORMIN) 25 MG tablet   GLUCOSAMINE-CHONDROITIN 750-600 MG OR TABS   losartan (COZAAR) 25 MG tablet     Review of Systems  As mentioned above in the history present illness. All other systems were reviewed and are negative.    Physical Exam   BP: 156/84  Pulse: 68  Temp: 98.4  F (36.9  C)  Resp: 18  Weight: 70.3 kg (155 lb)  SpO2: 98 %      Physical Exam  GENERAL: alert, no acute distress and no apparent distress, pt sounds congested  EYES:  Right conjunctiva is not injected and without discharge.  Left conjunctiva is not injected and without discharge.  EARS: Right TM is gray.  Left TM is normal: no effusions, no erythema, and normal landmarks.  NOSE: Nasal mucosa is inflamed with discharge that is clear and mucoid.   Sinus not tender.  THROAT: red/erythematous and exudate absent bilaterally, uvula midline, apthous ulcer noted in the left peritonsillar region.  NECK: supple with enlarged lymph nodes in the anterior cervical area.  CARDIAC:NORMAL - regular rate and rhythm without murmur.  RESP: Normal - CTA without rales, rhonchi, or wheezing.  SKIN: normal      ED Course        Procedures    Results for orders placed or performed during the hospital encounter of 01/19/19 (from the past 24 hour(s))   Rapid strep group A screen POCT   Result Value Ref Range    Rapid Strep A Screen Negative neg    Internal QC OK Yes    Beta strep group A culture   Result Value Ref Range    Specimen Description Throat     Special Requests Specimen collected in eSwab transport (white cap)     Culture Micro PENDING        Medications - No data to display    Assessments & Plan (with Medical Decision Making)     I have reviewed the nursing notes.    I have reviewed the findings, diagnosis, plan and need for follow up with the patient.  Medical Decision Making:  CXR is not indicated.  Rapid Strep test is indicated.     Assessment:  1) bacterial upper respiratory infection  aphthous ulcer.    PLAN:  Use mucinex prn, increase fluids and rest.   Follow up with any questions or problems         Medication List      Started    doxycycline hyclate 100 MG capsule  Commonly known as:  VIBRAMYCIN  100 mg, Oral, 2 TIMES DAILY     magic mouthwash suspension (diphenhydrAMINE, lidocaine, aluminum-magnesium & simethicone) compounding kit  5-10 mLs, Swish & Swallow, EVERY 6 HOURS PRN            Final diagnoses:   Viral syndrome   Aphthous ulcer       1/19/2019   [facility] EMERGENCY DEPARTMENT     Shanna Schmitz, ISABELLA CNP  01/19/19 1938

## 2019-01-19 NOTE — TELEPHONE ENCOUNTER
Patient states he has had a sore throat for a week; denies fever.  States back of throat is white and believes he sees a blister.  Rates pain 8 out of 10.  Has been taking Ibuprofen since yesterday which provides a little relief.  FNA advised to be seen within 24 hours and provided approximate wait time for [facility] and verified that [facility] at 12PM this date.    Regarding: none  ----- Message from Halima Kathleen sent at 1/19/2019 10:58 AM CST -----  Reason for call:  Symptom   Symptom or request: Sore throat; white spots; blisters in throat    Duration (how long have symptoms been present): UNKNOWN  Have you been treated for this before? No    Additional comments: NONE    Phone number to reach patient:  Home number on file [phone] (home)    Best Time:  ANYTIME    Can we leave a detailed message on this number?  YES

## 2019-01-19 NOTE — ED AVS SNAPSHOT
[facility] Emergency Department  [address] [address]  Phone:  [phone]  Fax:  [phone]                                    Cornel Yuen   MRN: [id number]    Department:  [facility] Emergency Department   Date of Visit:  1/19/2019           After Visit Summary Signature Page    I have received my discharge instructions, and my questions have been answered. I have discussed any challenges I see with this plan with the nurse or doctor.    ..........................................................................................................................................  Patient/Patient Representative Signature      ..........................................................................................................................................  Patient Representative Print Name and Relationship to Patient    ..................................................               ................................................  Date                                   Time    ..........................................................................................................................................  Reviewed by Signature/Title    ...................................................              ..............................................  Date                                               Time          22EPIC Rev 08/18

## 2019-01-21 LAB
BACTERIA SPEC CULT: NORMAL
Lab: NORMAL
SPECIMEN SOURCE: NORMAL

## 2019-01-21 NOTE — RESULT ENCOUNTER NOTE
Final Beta strep group A r/o culture is NEGATIVE for Group A streptococcus.    No treatment or change in treatment per [facility] Strep protocol.

## 2019-07-10 ENCOUNTER — TELEPHONE (OUTPATIENT)
Dept: FAMILY MEDICINE | Facility: CLINIC | Age: 69
End: 2019-07-10

## 2019-07-10 DIAGNOSIS — I10 ESSENTIAL HYPERTENSION, BENIGN: ICD-10-CM

## 2019-07-10 NOTE — TELEPHONE ENCOUNTER
"Requested Prescriptions   Pending Prescriptions Disp Refills     atenolol (TENORMIN) 25 MG tablet [Pharmacy Med Name: ATENOLOL 25 MG TABLET] 90 tablet 3     Sig: TAKE ONE TABLET BY MOUTH EVERY DAY       Beta-Blockers Protocol Failed - 7/10/2019  9:54 AM        Failed - Blood pressure under 140/90 in past 12 months     BP Readings from Last 3 Encounters:   01/19/19 156/84   01/08/19 (!) 159/94   12/14/18 132/76                 Passed - Patient is age 6 or older        Passed - Recent (12 mo) or future (30 days) visit within the authorizing provider's specialty     Patient had office visit in the last 12 months or has a visit in the next 30 days with authorizing provider or within the authorizing provider's specialty.  See \"Patient Info\" tab in inbasket, or \"Choose Columns\" in Meds & Orders section of the refill encounter.              Passed - Medication is active on med list        Last Written Prescription Date:  6/8/18  Last Fill Quantity: 90,  # refills: 3   Last office visit: 12/14/2018 with prescribing provider:  Nikki   Future Office Visit:      "

## 2019-07-11 RX ORDER — ATENOLOL 25 MG/1
TABLET ORAL
Qty: 90 TABLET | Refills: 1 | Status: SHIPPED | OUTPATIENT
Start: 2019-07-11 | End: 2019-10-09

## 2019-07-11 NOTE — TELEPHONE ENCOUNTER
Refilled for 6 months.  Recommend to have RN blood pressure check to make sure blood pressure is controlled.  Sincerely,  Noah Brumfield MD

## 2019-07-11 NOTE — TELEPHONE ENCOUNTER
I left a message for the patient to return call to clinic.   CSS please deliver message below from Dr. Brumfield.    Marianne LOCKETT RN

## 2019-07-11 NOTE — TELEPHONE ENCOUNTER
Routing refill request to provider for review/approval because:  Labs out of range:  bp high    BP 1-8-19 was derm appt  1-19-19 was ER visit.    LOV 11-23-18, bp elevated = 150/90  He returned 3 weeks later 12-14-18 for bp recheck and was controlled = 132/76    Santa SWAIN RN

## 2019-08-12 ENCOUNTER — OFFICE VISIT (OUTPATIENT)
Dept: FAMILY MEDICINE | Facility: CLINIC | Age: 69
End: 2019-08-12
Payer: COMMERCIAL

## 2019-08-12 VITALS
RESPIRATION RATE: 18 BRPM | BODY MASS INDEX: 23.33 KG/M2 | SYSTOLIC BLOOD PRESSURE: 138 MMHG | TEMPERATURE: 97.7 F | OXYGEN SATURATION: 98 % | HEIGHT: 69 IN | DIASTOLIC BLOOD PRESSURE: 82 MMHG | HEART RATE: 65 BPM | WEIGHT: 157.5 LBS

## 2019-08-12 DIAGNOSIS — I10 ESSENTIAL HYPERTENSION, BENIGN: ICD-10-CM

## 2019-08-12 DIAGNOSIS — E78.5 HYPERLIPIDEMIA LDL GOAL <130: ICD-10-CM

## 2019-08-12 DIAGNOSIS — M67.40 GANGLION CYST: Primary | ICD-10-CM

## 2019-08-12 DIAGNOSIS — Z12.5 SCREENING FOR PROSTATE CANCER: ICD-10-CM

## 2019-08-12 PROCEDURE — 99213 OFFICE O/P EST LOW 20 MIN: CPT | Performed by: FAMILY MEDICINE

## 2019-08-12 ASSESSMENT — MIFFLIN-ST. JEOR: SCORE: 1483.76

## 2019-08-12 NOTE — PATIENT INSTRUCTIONS
Information about ganglion cyst is given.    Please make a preventative exam      Thank you for choosing [facility].  You may be receiving an email and/or telephone survey request from [facility] Customer Experience regarding your visit today.  Please take a few minutes to respond to the survey to let us know how we are doing.      If you have questions or concerns, please contact us via [employer] or you can contact your care team at [phone].    Our Clinic hours are:  Monday 6:40 am  to 7:00 pm  Tuesday -Friday 6:40 am to 5:00 pm    The [place] outpatient lab hours are:  Monday - Friday 6:10 am to 4:45 pm  Saturdays 7:00 am to 11:00 am  Appointments are required, call [phone]    If you have clinical questions after hours or would like to schedule an appointment,  call the clinic at [phone].

## 2019-08-12 NOTE — PROGRESS NOTES
"Subjective     Cornel Yuen is a 69 year old male who presents to clinic today for the following health issues:    HPI   LUMP ON KNUCKLE JOINTS      Duration: 1-2 weeks    Description (location/character/radiation): right middle finger and left pinky finger.    Intensity:  mild    Accompanying signs and symptoms: small lump on knuckle joints.  Left pinky finger is still visible, however, right middle finger seems to be resolved.    History (similar episodes/previous evaluation): None    Precipitating or alleviating factors: Tender to firm touch; also when patient is gardening or trying to grab/ items, fingers/knuckles become tender or painful.    Therapies tried and outcome: None     He hit is right middle finger at the PIP joint and the mass went away.  He has a small one on left 5 th finger at PIP.  No pain with either one.  Has full rom.          Reviewed and updated as needed this visit by Provider         Review of Systems   ROS COMP: CONSTITUTIONAL:NEGATIVE for fever, chills, change in weight  INTEGUMENTARY/SKIN: as above  MUSCULOSKELETAL: NEGATIVE for significant arthralgias or myalgia  NEURO: NEGATIVE for weakness, dizziness or paresthesias      Objective    /82   Pulse 65   Temp 97.7  F (36.5  C) (Tympanic)   Resp 18   Ht 1.759 m (5' 9.25\")   Wt 71.4 kg (157 lb 8 oz)   SpO2 98%   BMI 23.09 kg/m    Body mass index is 23.09 kg/m .  Physical Exam   GENERAL APPEARANCE: healthy, alert and no distress  MS: only small mass dorsal aspect of left 5th finger at PIP joint.  2-3 mmm, no pain and full rom of his fingers  SKIN: no suspicious lesions or rashes  NEURO: Normal strength and tone, mentation intact and speech normal  PSYCH: mentation appears normal and affect normal/bright            Assessment & Plan     (M67.40) Ganglion cyst  (primary encounter diagnosis)  Comment: discussed diagnosis and conservative treatment, handouts are given  Plan:     (E78.5) Hyperlipidemia LDL goal " <130  Comment: he will schedule preventative exam  Plan: Lipid panel reflex to direct LDL Fasting            (I10) Essential hypertension, benign  Comment:   Plan: Basic metabolic panel            (Z12.5) Screening for prostate cancer  Comment:   Plan: Prostate spec antigen screen                 See Patient Instructions    Return in about 4 weeks (around 9/9/2019) for If not better.    Noah Brumfield MD  [facility] – [place]

## 2019-09-12 DIAGNOSIS — Z12.5 SCREENING FOR PROSTATE CANCER: ICD-10-CM

## 2019-09-12 DIAGNOSIS — E78.5 HYPERLIPIDEMIA LDL GOAL <130: ICD-10-CM

## 2019-09-12 DIAGNOSIS — I10 ESSENTIAL HYPERTENSION, BENIGN: ICD-10-CM

## 2019-09-12 LAB
ANION GAP SERPL CALCULATED.3IONS-SCNC: 5 MMOL/L (ref 3–14)
BUN SERPL-MCNC: 19 MG/DL (ref 7–30)
CALCIUM SERPL-MCNC: 8.9 MG/DL (ref 8.5–10.1)
CHLORIDE SERPL-SCNC: 106 MMOL/L (ref 94–109)
CHOLEST SERPL-MCNC: 215 MG/DL
CO2 SERPL-SCNC: 28 MMOL/L (ref 20–32)
CREAT SERPL-MCNC: 0.97 MG/DL (ref 0.66–1.25)
GFR SERPL CREATININE-BSD FRML MDRD: 80 ML/MIN/{1.73_M2}
GLUCOSE SERPL-MCNC: 101 MG/DL (ref 70–99)
HDLC SERPL-MCNC: 70 MG/DL
LDLC SERPL CALC-MCNC: 127 MG/DL
NONHDLC SERPL-MCNC: 145 MG/DL
POTASSIUM SERPL-SCNC: 4 MMOL/L (ref 3.4–5.3)
PSA SERPL-ACNC: 0.76 UG/L (ref 0–4)
SODIUM SERPL-SCNC: 139 MMOL/L (ref 133–144)
TRIGL SERPL-MCNC: 92 MG/DL

## 2019-09-12 PROCEDURE — 80048 BASIC METABOLIC PNL TOTAL CA: CPT | Performed by: FAMILY MEDICINE

## 2019-09-12 PROCEDURE — 80061 LIPID PANEL: CPT | Performed by: FAMILY MEDICINE

## 2019-09-12 PROCEDURE — 36415 COLL VENOUS BLD VENIPUNCTURE: CPT | Performed by: FAMILY MEDICINE

## 2019-09-12 PROCEDURE — G0103 PSA SCREENING: HCPCS | Performed by: FAMILY MEDICINE

## 2019-09-28 NOTE — MR AVS SNAPSHOT
After Visit Summary   6/8/2018    Cornel Yuen    MRN: [id number]           Patient Information     Date Of Birth          1950        Visit Information        Provider Department      6/8/2018 11:00 AM Noah Brumfield MD [facility]        Today's Diagnoses     Encounter for routine adult health examination without abnormal findings    -  1    Essential hypertension, benign        Low libido        Screening for prostate cancer        Need for vaccination        BENIGN HYPERTENSION          Care Instructions    Please make a morning lab visit to check your labs. (7-9 am)    I refilled your medication.    Preventive Health Recommendations:       Male Ages 65 and over    Yearly exam:             See your health care provider every year in order to  o   Review health changes.   o   Discuss preventive care.    o   Review your medicines if your doctor has prescribed any.    Talk with your health care provider about whether you should have a test to screen for prostate cancer (PSA).    Every 3 years, have a diabetes test (fasting glucose). If you are at risk for diabetes, you should have this test more often.    Every 5 years, have a cholesterol test. Have this test more often if you are at risk for high cholesterol or heart disease.     Every 10 years, have a colonoscopy. Or, have a yearly FIT test (stool test). These exams will check for colon cancer.    Talk to with your health care provider about screening for Abdominal Aortic Aneurysm if you have a family history of AAA or have a history of smoking.  Shots:     Get a flu shot each year.     Get a tetanus shot every 10 years.     Talk to your doctor about your pneumonia vaccines. There are now two you should receive - Pneumovax (PPSV 23) and Prevnar (PCV 13).    Talk to your doctor about a shingles vaccine.     Talk to your doctor about the hepatitis B vaccine.  Nutrition:     Eat at least 5 servings of fruits and vegetables     Patient:   HANSEL RIOS            MRN: [id number]            FIN: [id number]               Age:   41 years     Sex:  FEMALE     :  79   Associated Diagnoses:   None   Author:   MAC PARNELL      Chief Complaint      Review of Systems   All other systems All other systems are negative.     Histories   Past Med History: Past Medical History   Anemia  Diabetes  Hypertension     Family History:    MOTHER  Hypertension  Migraine  Anemia  FATHER  Glaucoma     Procedure History:    Tubal ligation ([id number]).  Comments:  2017 21:27 - Rupa Urena     Social History        Alcohol  Details: Use: None.  Exercise  Details: Excercise: Occasionally.  Times Per Week: 3-4 times/week.; Comment(s): squats  Home/Environment  Details: Alcohol Abuse in Household: No.  Substance Abuse in Household: No.  Smoker in Household: No.  Patient Lives With: Spouse.  Living Situation: Lives With Spouse.  Ambulation: Independent.  Bathing: Independent.  Dressing: Independent.  Driving: Independent.  Eating: Independent.  Elimination: Independent.  Grooming: Independent.  Preparing Meal: Independent.  Taking Meds: Independent.  Toileting: Independent.  Transfers: Independent.  Assistive Devices Home: none.  Rehab Consulted No.  Substance Abuse  Details: Use: None.  Tobacco  Details: Used in Last 12 Months: No.  Use: Never smoker.  Cultural/Buddhist Practices  Details: Buddhist or Cultural Practices: Non denomonational.  Buddhist or Cultural Practices While in Hospital: No.  .        Health Status   Allergies:    Allergic Reactions (All)  Severity Not Documented  Lisinopril- No reactions were documented.  Losartan- No reactions were documented.  Seafood- No reactions were documented.  Canceled/Inactive Reactions (All)  Severity Not Documented  NKA- No reactions were documented., Allergies (ST)   Allergies (3) Active Reaction  lisinopril None Documented  losartan None Documented  Seafood None Documented      Current medications:  (Selected)   Inpatient Medications  Ordered  Milk of Magnesia oral 8% suspension: 30 mL, Oral, QID, PRN constipation, 03/09/17 10:41:00, Routine, Suspension  Norco oral 325-5 mg tablet: 1 tab, Oral, Q4H, PRN pain, 03/10/17 7:32:00, Routine, Tab  Norco oral 325-5 mg tablet: 1 tab, Oral, Q6H, PRN pain, 03/10/17 7:32:00, Routine, Tab  Sodium Chloride 0.9% 1,000 mL: 1,000 mL, IV, 03/06/17 23:25:00, Routine, 1,000 mL TOTAL Volume, RATE: 83 mL/hr, Infuse over 12 hr, IV Soln, Weight: 102 kg  Zofran injection 2 mg/mL: 4 mg = 2 mL, Slow IV Push, Q4H, PRN nausea or vomiting, 03/06/17 23:26:00, Routine, Injection  amLODIPine oral 10 mg tablet: 10 mg = 1 tab, Oral, Daily, 03/07/17 9:00:00, Routine, Tab  diphenhydrAMINE (Benadryl).: 25 mg = 0.5 mL, Slow IV Push, Q6H, PRN pruritus, 03/07/17 9:02:00, Routine, Injection  diphenhydrAMINE injection (Benadryl): 25 mg = 0.5 mL, Slow IV Push, Q6H, PRN itching, 03/06/17 23:27:00, Routine, Injection  ferrous sulfate oral 325 mg tablet [65 mg iron] (Feosol): 325 mg = 1 tab, Oral, Daily, 03/07/17 9:00:00, Routine, Tab  heparin subcutaneous injection 5,000 unit: 5,000 unit = 0.5 mL, Subcutaneous, Q8H, 03/07/17 14:00:00, Routine, Injection  insulin lispro (HumaLOG) sliding scale: 2-10 unit, Subcutaneous, TID [with meals], 03/07/17 8:00:00, Routine, Injection  ketorolac (Toradol).: 30 mg = 1 mL, IV Push, Q6H, PRN breakthrough pain, 03/07/17 9:02:00, Routine, x 5 days, Stop: 03/12/17 9:01:00, Injection  metFORMIN oral 500 mg tablet: 500 mg = 1 tab, Oral, BID [with breakfast & dinner], 03/10/17 17:00:00, Routine, Tab  morphine injection: 1 mg = 0.5 mL, IV Push, Q4H, PRN pain, 03/06/17 23:25:00, Routine, Injection  naloxone (Narcan).: 0.2 mg = 0.5 mL, IV Push, On Call, PRN respiratory distress, 03/07/17 9:02:00, Routine, Injection, Preparation: Draw up 0.4mg (1mL) NALOXONE in 10mL syringe and mix with 9mL saline. Give 0.2mg (5mL) over 2 minutes while observing the  each day.     Eat whole-grain bread, whole-wheat pasta and brown rice instead of white grains and rice.     Talk to your doctor about Calcium and Vitamin D.   Lifestyle    Exercise for at least 150 minutes a week (30 minutes a day, 5 days a week). This will help you control your weight and prevent disease.     Limit alcohol to one drink per day.     No smoking.     Wear sunscreen to prevent skin cancer.     See your dentist every six months for an exam and cleaning.     See your eye doctor every 1 to 2 years to screen for conditions such as glaucoma, macular degeneration and cataracts.          Follow-ups after your visit        Follow-up notes from your care team     Return for Lab Work, needs to be 7-9 am.      Future tests that were ordered for you today     Open Future Orders        Priority Expected Expires Ordered    Testosterone, total Routine  9/8/2018 6/8/2018    Prostate spec antigen screen Routine  9/8/2018 6/8/2018    Basic metabolic panel Routine  9/8/2018 6/8/2018            Who to contact     If you have questions or need follow up information about today's clinic visit or your schedule please contact [facility] directly at [phone].  Normal or non-critical lab and imaging results will be communicated to you by MyChart, letter or phone within 4 business days after the clinic has received the results. If you do not hear from us within 7 days, please contact the clinic through MyChart or phone. If you have a critical or abnormal lab result, we will notify you by phone as soon as possible.  Submit refill requests through [employer] or call your pharmacy and they will forward the refill request to us. Please allow 3 business days for your refill to be completed.          Additional Information About Your Visit        Care EveryWhere ID     This is your Care EveryWhere ID. This could be used by other organizations to access your [place] medical records  [id number]        Your Vitals Were   "   Pulse Temperature Height BMI (Body Mass Index)          60 97.3  F (36.3  C) (Tympanic) 5' 9.25\" (1.759 m) 22.84 kg/m2         Blood Pressure from Last 3 Encounters:   06/08/18 132/80   02/19/18 137/77   05/09/17 136/78    Weight from Last 3 Encounters:   06/08/18 155 lb 12.8 oz (70.7 kg)   02/24/16 160 lb (72.6 kg)   02/18/15 161 lb (73 kg)              We Performed the Following     1st  Administration  [36494]     Pneumococcal vaccine 13 valent PCV13 IM (Prevnar) [23806]          Today's Medication Changes          These changes are accurate as of 6/8/18 11:52 AM.  If you have any questions, ask your nurse or doctor.               Stop taking these medicines if you haven't already. Please contact your care team if you have questions.     order for DME   Stopped by:  Noah Brumfield MD                Where to get your medicines      These medications were sent to [address] [address]     Phone:  [phone]     atenolol 25 MG tablet    valsartan 160 MG tablet                Primary Care Provider Office Phone # Fax #    Noah Brumfield [phone] [address] [facility] [address]        Equal Access to Services     [facility] Center[facility]AY[facility] AH: Hadii denita ku hadasho Soomaali, waaxda luqadaha, qaybta kaalmada adeegyada, jon allen haymelissa booker . So [facility] [phone].    ATENCIÓN: Si habla español, tiene a amor disposición servicios gratuitos de asistencia lingüística. Leola al [phone].    We comply with applicable federal civil rights laws and [place] laws. We do not discriminate on the basis of race, color, national origin, age, disability, sex, sexual orientation, or gender identity.            Thank you!     Thank you for choosing [facility]  for your care. Our goal is always to provide you with excellent care. Hearing back from our patients is one way we can continue to improve " patient's respons...  ondansetron (Zofran).: 4 mg = 2 mL, Slow IV Push, Q6H, PRN nausea, 03/07/17 9:02:00, Routine, Injection  Prescriptions  Prescribed  Colace (sodium) oral 100 mg capsule: 100 mg = 1 cap, Oral, BID, # 60 cap, 3 Refills, Maintenance, given to patient  Norco oral 325-5 mg tablet: = 1 tab, Oral, Q4H, PRN for pain, # 90 tab, 0 Refills, Maintenance, given to patient  Documented Medications  Documented  Glucophage: 500 mg, Oral, BID, Maintenance  amLODIPine: 10 mg, Oral, Daily, Maintenance  ferrous sulfate oral 325 mg tablet [65 mg iron] (Feosol): 325 mg = 1 tab, Oral, Daily, Maintenance,    Medications (16) Active  Scheduled: (5)  AmLODIPine 10 mg tab  10 mg 1 tab, Oral, Daily  Ferrous sulfate 325 mg tab [Fe 65 mg]  325 mg 1 tab, Oral, Daily  Heparin PF 5,000 unit/0.5 mL inj syringe  5,000 unit 0.5 mL, Subcutaneous, Q8H  Insulin human lispro 100 unit/mL inj 3 mL BULK  2-10 unit, Subcutaneous, TID [with meals]  MetFORMIN 500 mg tab  500 mg 1 tab, Oral, BID [with breakfast & dinner]  Continuous: (1)  Sodium Chloride 0.9% 1,000 mL  1,000 mL, IV, 83 mL/hr  PRN: (10)  DiphenhydrAMINE 50 mg/1 mL inj SDV  25 mg 0.5 mL, Slow IV Push, Q6H  DiphenhydrAMINE 50 mg/1 mL inj SDV  25 mg 0.5 mL, Slow IV Push, Q6H  HYDROcodone-acetaminophen 5-325 mg tab  1 tab, Oral, Q4H  HYDROcodone-acetaminophen 5-325 mg tab  1 tab, Oral, Q6H  Ketorolac 30 mg/1 mL inj SDV  30 mg 1 mL, IV Push, Q6H  Milk of magnesia 8% 30 mL oral susp UD  30 mL, Oral, QID  Morphine PF 2 mg/1 mL inj SDV  1 mg 0.5 mL, IV Push, Q4H  Naloxone 0.4 mg/1 mL inj SDV  0.2 mg 0.5 mL, IV Push, On Call  Ondansetron 4 mg/2 mL inj SDV  4 mg 2 mL, Slow IV Push, Q4H  Ondansetron 4 mg/2 mL inj SDV  4 mg 2 mL, Slow IV Push, Q6H  ,   Home Medications (5) Active  amLODIPine 10 mg, Oral, Daily  Colace (sodium) oral 100 mg capsule 100 mg = 1 cap, Oral, BID  ferrous sulfate oral 325 mg tablet [65 mg iron] (Feosol) 325 mg = 1 tab, Oral, Daily  Glucophage 500 mg, Oral,  BID  Norco oral 325-5 mg tablet 1 tab, PRN, Oral, Q4H         Physical Examination   VS/Measurements        Vitals between:   10-MAR-2017 14:30:06   TO   11-MAR-2017 14:30:06                   LAST RESULT MINIMUM MAXIMUM  Temperature 37.1 36.3 37.1  Heart Rate 82 67 82  Respiratory Rate 16 16 18  NISBP           126 106 126  NIDBP           80 65 83  NIMBP           95 79 96  SpO2                    97 97 99     General:  Alert and oriented, No acute distress.    Eye:  Pupils are equal, round and reactive to light, Extraocular movements are intact, Normal conjunctiva.    HENT:  Normocephalic, Oral mucosa is moist, No sinus tenderness.    Neck:  Supple, Non-tender, No carotid bruit, No jugular venous distention, No lymphadenopathy, No thyromegaly.    Respiratory:  Lungs are clear to auscultation, Respirations are non-labored, Breath sounds are equal, Symmetrical chest wall expansion, No chest wall tenderness.    Cardiovascular:  Normal rate, Regular rhythm, No murmur, Good pulses equal in all extremities, Normal peripheral perfusion, No edema.    Gastrointestinal:  Soft, Non-distended, Normal bowel sounds, No organomegaly, rlq pain, s/p surg.    Lymphatics:  No lymphadenopathy neck, axilla, groin.    Musculoskeletal:  Normal range of motion, Normal strength, No swelling, No deformity, Normal gait.    Integumentary:  Warm, Intact, Moist, No rash.    Neurologic:  Alert, Oriented, Normal motor function, No focal deficits, Cranial Nerves II-XII are grossly intact, Normal deep tendon reflexes.    Cognition and Speech:  Oriented, Speech clear and coherent, Functional cognition intact.    Psychiatric:  Cooperative, Appropriate mood & affect.       Review / Management   Laboratory results:       Labs between:  10-MAR-2017 14:30 to 11-MAR-2017 14:30    POC GLU:                 Latest Result  Latest Date  Minimum  Min Date  Maximum  Max Date                             (H) 134  11-MAR-2017 (H) 134  11-MAR-2017 (H) 155  our services. Please take a few minutes to complete the written survey that you may receive in the mail after your visit with us. Thank you!             Your Updated Medication List - Protect others around you: Learn how to safely use, store and throw away your medicines at www.disposemymeds.org.          This list is accurate as of 6/8/18 11:52 AM.  Always use your most recent med list.                   Brand Name Dispense Instructions for use Diagnosis    aspirin 81 MG tablet      1 TABLET DAILY        atenolol 25 MG tablet    TENORMIN    90 tablet    Take 1 tablet (25 mg) by mouth daily    Essential hypertension, benign       glucosamine-chondroitinoitin 750-600 MG Tabs     0    1 tab daily    Knee pain       valsartan 160 MG tablet    DIOVAN    180 tablet    Take 2 tablets (320 mg) by mouth daily He did not tolerate the other ARBs, they were tried before and he ended up on valsartan.    Essential hypertension, benign           10-MAR-2017                 .    Radiology results                     Cardiac Monitor:  Reveals a Normal sinus rhythm.    ECG interp:  Normal sinus rhythm, lae.    Lines and Tubes:    LINES  Peripheral Intravenous Hand Left 22   Gauge: 22   Charted: 03/11/17 11:55  Inserted: 03/09/17   Days Since Insertion: 2 days  Indication of Use: Heparin Lock   .    Documentation reviewed:  Reviewed prior records, Reviewed home medications.       Impression and Plan   Dx and Plan:     Diagnosis     Assessment/Plan:   NO DIAGNOSIS WHERE ENTERED ON THIS ENCOUNTER.   .     .    Orders     Orders (Selected)   Inpatient Orders  Ordered  ADL/Activity/Safety: 03/06/17 18:58:36  Activity Patient: 03/07/17 9:30:00, Complete Bed Rest, Up with assist PRN in AM  Activity Patient: 03/07/17 9:34:00, Complete Bed Rest, Up with assist PRN in AM  Bed Request: 03/06/17 19:22:00, Admitting Physician MAC PARNELL, Attending Physician MAC PARNELL, Diagnosis : ABDOMINAL PAIN, Level of Care Admit to Inpatient Services, Bed Type - TRI medical, Isolation/Special Needs : None, Mental Status : Alert...  Zackery Assessment Initial: 03/06/17 18:58:36, One Time (unscheduled)  Zackery Assessment: 03/07/17 8:00:00, QAM at 8  Diabetic Diet Medium (0934-3080 ) Calorie: 03/09/17 16:23:00  Discharge Patient from Recovery/Phase II: 03/07/17 8:49:00, RECOVERY USE ONLY  Discharge Patient: 03/11/17 8:00:00, d/c in am  Equipment Communication: ROUTINE, 03/07/17 9:02:00, PCA Machine  Hysterectomy Abdominal Total: STELLA OVIEDO, EXPLORATORY LAPAROTOMY WITH POSSIBLE TOTAL ABDOMINAL HYSTERECTOMY, Primary Procedure, General, 150, 15, 10, Concurrent  Level of Care: 03/06/17 19:22:00, Admitting Physician MAC PARNELL, Attending Physician MAC PARNELL, Diagnosis : ABDOMINAL PAIN, Level of Care Admit to Inpatient Services, Bed Type - TRI medical, Isolation/Special Needs : None, Mental Status : Alert...  Milk of Magnesia oral 8% suspension:  30 mL, Oral, QID, PRN constipation, 03/09/17 10:41:00, Routine, Suspension  Norco oral 325-5 mg tablet: 1 tab, Oral, Q4H, PRN pain, 03/10/17 7:32:00, Routine, Tab  Norco oral 325-5 mg tablet: 1 tab, Oral, Q6H, PRN pain, 03/10/17 7:32:00, Routine, Tab  Nursing Communication: 03/10/17 7:32:00, can d/c from surgery standpoint f/u 1 week  Ongoing Patient Assessment: 03/06/17 18:58:35  PCA Shift Total and Clear: 03/07/17 9:02:00, Q End of Shift (8 hr), Document shift totals in [facility]  Patient Education: 03/06/17 18:58:36  SCD - TRI: ROUTINE, 03/07/17 9:34:00  SCD - TRI: ROUTINE, 03/07/17 9:35:00  Sodium Chloride 0.9% 1,000 mL: 1,000 mL, IV, 03/06/17 23:25:00, Routine, 1,000 mL TOTAL Volume, RATE: 83 mL/hr, Infuse over 12 hr, IV Soln, Weight: 102 kg  Telemetry: 03/07/17 8:49:00, RECOVERY USE ONLY  VTE Prophylaxis Education: 03/06/17 21:55:00  Zofran injection 2 mg/mL: 4 mg = 2 mL, Slow IV Push, Q4H, PRN nausea or vomiting, 03/06/17 23:26:00, Routine, Injection  amLODIPine oral 10 mg tablet: 10 mg = 1 tab, Oral, Daily, 03/07/17 9:00:00, Routine, Tab  diphenhydrAMINE (Benadryl).: 25 mg = 0.5 mL, Slow IV Push, Q6H, PRN pruritus, 03/07/17 9:02:00, Routine, Injection  diphenhydrAMINE injection (Benadryl): 25 mg = 0.5 mL, Slow IV Push, Q6H, PRN itching, 03/06/17 23:27:00, Routine, Injection  ferrous sulfate oral 325 mg tablet [65 mg iron] (Feosol): 325 mg = 1 tab, Oral, Daily, 03/07/17 9:00:00, Routine, Tab  heparin subcutaneous injection 5,000 unit: 5,000 unit = 0.5 mL, Subcutaneous, Q8H, 03/07/17 14:00:00, Routine, Injection  insulin lispro (HumaLOG) sliding scale: 2-10 unit, Subcutaneous, TID [with meals], 03/07/17 8:00:00, Routine, Injection  ketorolac (Toradol).: 30 mg = 1 mL, IV Push, Q6H, PRN breakthrough pain, 03/07/17 9:02:00, Routine, x 5 days, Stop: 03/12/17 9:01:00, Injection  metFORMIN oral 500 mg tablet: 500 mg = 1 tab, Oral, BID [with breakfast & dinner], 03/10/17 17:00:00, Routine, Tab  morphine  injection: 1 mg = 0.5 mL, IV Push, Q4H, PRN pain, 03/06/17 23:25:00, Routine, Injection  naloxone (Narcan).: 0.2 mg = 0.5 mL, IV Push, On Call, PRN respiratory distress, 03/07/17 9:02:00, Routine, Injection, Preparation: Draw up 0.4mg (1mL) NALOXONE in 10mL syringe and mix with 9mL saline. Give 0.2mg (5mL) over 2 minutes while observing the patient's respons...  ondansetron (Zofran).: 4 mg = 2 mL, Slow IV Push, Q6H, PRN nausea, 03/07/17 9:02:00, Routine, Injection  Prescriptions  Prescribed  Colace (sodium) oral 100 mg capsule: 100 mg = 1 cap, Oral, BID, # 60 cap, 3 Refills, Maintenance, given to patient  Norco oral 325-5 mg tablet: = 1 tab, Oral, Q4H, PRN for pain, # 90 tab, 0 Refills, Maintenance, given to patient  Documented Medications  Documented  Glucophage: 500 mg, Oral, BID, Maintenance  amLODIPine: 10 mg, Oral, Daily, Maintenance  ferrous sulfate oral 325 mg tablet [65 mg iron] (Feosol): 325 mg = 1 tab, Oral, Daily, Maintenance.     .    Dx and Plan:     Diagnosis     c/c : abd pain    hpi: 41 y female with dm, htn, presented to er with severe rlq pain for 5 days , getting worse , ppted by nothing, improved with rx in er, no n v fever chills imaging revealed u fibroid dermoid cyst r ovary pt seen by ob gyn dr foster pt electivly admitted for surgery as per pt's request pt had presented to er 03/03/2017, seen by dr dr parnell yesterday in his office , case d/w dr foster as well , rn, pain : r flank r to rlq constant sharp , heavy mens bleeding no dyuria    ros : all ros  nege xcept as in hpi    a/p : 41 y female with dm, htn has been admitted for surg rx electively    1. abd /pelvic pain: d cyst r ovary/ u fibroid: pt requesting surgical rx: pt to get hysterectomy with oophorectomy as per gyn: low risk for perioperative cardiac and pulm complications, no h/o pe, mi , acs, chf, aifb, d/w dr barros, rn      2. dm, htn: resime home meds    3. dvt prophylaxis: ambulate before and after surg, sub cut heparin 5000 u  every 8 hrs after surgery to be started when ok by dr foster      Pt ok to undergo surgery.      03/08/2017; walked up to rn samantha, rlq pain, did not pass stool, ? gas, headache, symptomatic rx, d/w pt, sig other, rn, cpm    03/09/2017: passed stool and gas, ct ambulation, cpm as per gyn, d/c planning, still needs sig pain meds       03/10/2017: Pt doing much better, walking in the hallway, passed stool and gas, off dilaudid, walked steps with rn assistance, incentive spirometry 1750, ok to d/c in am, does not want any narcotics for pain, will use tylenol prn      03/11/2017: ok to d/c and f/u as an out patient, d/w rn multiple times, f/u with gyn as an outpt as well, if pain fever or any new sxs call operating md or us.     .         Course: Progressing as expected.    Dx and Plan:  Orders     Orders   Patient Care:  Discharge Patient (Order Processing): 03/11/17 08:00, d/c in am.     .    Education and Follow-up:          Counseled: Patient, Family.         Discharge Planning: Plan to discharge ( To home ), STELLA FOSTER MD In 1 week 03/17/17; MAC LANDON MD Within 1 to 2 weeks Bring all your medications to your appointment, copy of ID and insurance card..             Electronically Signed On 03/11/2017 14:31  __________________________________________________   MAC PARNELL      Electronically Signed On 03/11/2017 14:31  __________________________________________________   MAC PARNELL

## 2019-10-09 ENCOUNTER — OFFICE VISIT (OUTPATIENT)
Dept: FAMILY MEDICINE | Facility: CLINIC | Age: 69
End: 2019-10-09
Payer: COMMERCIAL

## 2019-10-09 ENCOUNTER — TELEPHONE (OUTPATIENT)
Dept: FAMILY MEDICINE | Facility: CLINIC | Age: 69
End: 2019-10-09

## 2019-10-09 VITALS
HEIGHT: 70 IN | WEIGHT: 152 LBS | SYSTOLIC BLOOD PRESSURE: 136 MMHG | HEART RATE: 62 BPM | TEMPERATURE: 98.1 F | OXYGEN SATURATION: 99 % | BODY MASS INDEX: 21.76 KG/M2 | DIASTOLIC BLOOD PRESSURE: 72 MMHG

## 2019-10-09 DIAGNOSIS — Z00.00 ENCOUNTER FOR MEDICARE ANNUAL WELLNESS EXAM: Primary | ICD-10-CM

## 2019-10-09 DIAGNOSIS — I10 ESSENTIAL HYPERTENSION, BENIGN: ICD-10-CM

## 2019-10-09 DIAGNOSIS — N52.9 ERECTILE DYSFUNCTION, UNSPECIFIED ERECTILE DYSFUNCTION TYPE: ICD-10-CM

## 2019-10-09 DIAGNOSIS — Z23 NEED FOR PROPHYLACTIC VACCINATION AND INOCULATION AGAINST INFLUENZA: ICD-10-CM

## 2019-10-09 PROCEDURE — G0009 ADMIN PNEUMOCOCCAL VACCINE: HCPCS | Performed by: FAMILY MEDICINE

## 2019-10-09 PROCEDURE — 90732 PPSV23 VACC 2 YRS+ SUBQ/IM: CPT | Performed by: FAMILY MEDICINE

## 2019-10-09 PROCEDURE — 99207 C PAF COMPLETED  NO CHARGE: CPT | Mod: 25 | Performed by: FAMILY MEDICINE

## 2019-10-09 PROCEDURE — 99397 PER PM REEVAL EST PAT 65+ YR: CPT | Mod: 25 | Performed by: FAMILY MEDICINE

## 2019-10-09 PROCEDURE — G0008 ADMIN INFLUENZA VIRUS VAC: HCPCS | Performed by: FAMILY MEDICINE

## 2019-10-09 PROCEDURE — 99213 OFFICE O/P EST LOW 20 MIN: CPT | Mod: 25 | Performed by: FAMILY MEDICINE

## 2019-10-09 PROCEDURE — 90662 IIV NO PRSV INCREASED AG IM: CPT | Performed by: FAMILY MEDICINE

## 2019-10-09 RX ORDER — ATENOLOL 25 MG/1
25 TABLET ORAL DAILY
Qty: 90 TABLET | Refills: 3 | Status: SHIPPED | OUTPATIENT
Start: 2019-10-09 | End: 2020-11-04

## 2019-10-09 RX ORDER — VARDENAFIL HYDROCHLORIDE 20 MG/1
20 TABLET ORAL DAILY PRN
Qty: 6 TABLET | Refills: 11 | Status: SHIPPED | OUTPATIENT
Start: 2019-10-09 | End: 2019-10-10

## 2019-10-09 RX ORDER — LOSARTAN POTASSIUM 25 MG/1
12.5 TABLET ORAL DAILY
Qty: 45 TABLET | Refills: 3 | Status: SHIPPED | OUTPATIENT
Start: 2019-10-09 | End: 2020-10-28

## 2019-10-09 ASSESSMENT — MIFFLIN-ST. JEOR: SCORE: 1462.78

## 2019-10-09 NOTE — PATIENT INSTRUCTIONS
I refilled your medications.    We already did your labs earlier this fall.    I did send generic levitra medication.  Try a quarter tablet.  If this works but still having side effect please let me know and I write for even a small dose of the medication.  Or there is a fourth medication now call avanafil.    Please be aware that there will be an additional charge during your preventative visit due to either a new diagnosis and/or chronic disease management.    Preventative visits screen for diseases prior to they occur.  They do not cover for any new diagnosis or chronic disease management.     If you have questions regarding your coverage please check with your insurance provider.  At [facility] we need to code correctly to be in compliance with all insurance companies.        Thank you for choosing [facility].  You may be receiving an email and/or telephone survey request from [facility] Customer Experience regarding your visit today.  Please take a few minutes to respond to the survey to let us know how we are doing.      If you have questions or concerns, please contact us via [place] or you can contact your care team at [phone].    Our Clinic hours are:  Monday 6:40 am  to 7:00 pm  Tuesday -Friday 6:40 am to 5:00 pm    The [place] outpatient lab hours are:  Monday - Friday 6:10 am to 4:45 pm  Saturdays 7:00 am to 11:00 am  Appointments are required, call [phone]    If you have clinical questions after hours or would like to schedule an appointment,  call the clinic at [phone].    Patient Education   Personalized Prevention Plan  You are due for the preventive services outlined below.  Your care team is available to assist you in scheduling these services.  If you have already completed any of these items, please share that information with your care team to update in your medical record.  Health Maintenance Due   Topic Date Due     Hepatitis C Screening  1950     Discuss Advance Care  Planning  05/30/2017     Zoster (Shingles) Vaccine (2 of 3) 07/04/2017     AORTIC ANEURYSM SCREENING (SYSTEM ASSIGNED)  07/17/2017     PHQ-2  01/01/2019     Annual Wellness Visit  06/08/2019     Flu Vaccine (1) 09/01/2019     Pneumococcal Vaccine (2 of 2 - PPSV23) 06/08/2019        Patient Education   Personalized Prevention Plan  You are due for the preventive services outlined below.  Your care team is available to assist you in scheduling these services.  If you have already completed any of these items, please share that information with your care team to update in your medical record.  Health Maintenance Due   Topic Date Due     Hepatitis C Screening  1950     Discuss Advance Care Planning  05/30/2017     Zoster (Shingles) Vaccine (2 of 3) 07/04/2017     AORTIC ANEURYSM SCREENING (SYSTEM ASSIGNED)  07/17/2017     PHQ-2  01/01/2019     Annual Wellness Visit  06/08/2019     Flu Vaccine (1) 09/01/2019     Pneumococcal Vaccine (2 of 2 - PPSV23) 06/08/2019

## 2019-10-09 NOTE — PROGRESS NOTES
"SUBJECTIVE:   Cornel Yuen is a 69 year old male who presents for Preventive Visit.    Are you in the first 12 months of your Medicare Part B coverage?  No    Physical Health:    In general, how would you rate your overall physical health? excellent    Outside of work, how many days during the week do you exercise? none    Outside of work, approximately how many minutes a day do you exercise?not applicable  If you drink alcohol do you typically have >3 drinks per day or >7 drinks per week? Yes - AUDIT SCORE:     AUDIT - Alcohol Use Disorders Identification Test - Reproduced from the World Health Organization Audit 2001 (Second Edition) 6/8/2018   1.  How often do you have a drink containing alcohol? 4 or more times a week   2.  How many drinks containing alcohol do you have on a typical day when you are drinking? 3 or 4   3.  How often do you have five or more drinks on one occasion? Less than monthly   4.  How often during the last year have you found that you were not able to stop drinking once you had started? Never   5.  How often during the last year have you failed to do what was normally expected of you because of drinking? Never   6.  How often during the last year have you needed a first drink in the morning to get yourself going after a heavy drinking session? Never   7.  How often during the last year have you had a feeling of guilt or remorse after drinking? Never   8.  How often during the last year have you been unable to remember what happened the night before because of your drinking? Never   9.  Have you or someone else been injured because of your drinking? No   10. Has a relative, friend, doctor or other health care worker been concerned about your drinking or suggested you cut down? Yes, during the last year   TOTAL SCORE 10       Do you usually eat at least 4 servings of fruit and vegetables a day, include whole grains & fiber and avoid regularly eating high fat or \"junk\" foods? Yes    Do " you have any problems taking medications regularly?  No    Do you have any side effects from medications? none    Needs assistance for the following daily activities: no assistance needed    Which of the following safety concerns are present in your home?  lack of grab bars in the bathroom     Hearing impairment: Yes, has hearing aids    In the past 6 months, have you been bothered by leaking of urine? no    Mental Health:    In general, how would you rate your overall mental or emotional health? excellent  PHQ-2 Score:  0    Do you feel safe in your environment? Yes    Do you have a Health Care Directive? Yes: Patient states has Advance Directive and will bring in a copy to clinic.    Additional concerns to address?  No    Fall risk:  Fallen 2 or more times in the past year?: No  Any fall with injury in the past year?: No  click delete button to remove this line now  Cognitive Screenin) Repeat 3 items (Leader, Season, Table)    2) Clock draw: NORMAL  3) 3 item recall: Recalls 3 objects  Results: 3 items recalled: COGNITIVE IMPAIRMENT LESS LIKELY    Mini-CogTM Copyright MISAEL Oropeza. Licensed by the author for use in [facility]; reprinted with permission (soob@[facility]). All rights reserved.        Reviewed and updated as needed this visit by clinical staff  Tobacco  Allergies  Meds  Med Hx  Surg Hx  Fam Hx  Soc Hx        Reviewed and updated as needed this visit by Provider        Social History     Tobacco Use     Smoking status: Former Smoker     Smokeless tobacco: Never Used   Substance Use Topics     Alcohol use: Yes     Comment: 2-3 scotch per night                           Current providers sharing in care for this patient include:   Patient Care Team:  Noah Brumfield MD as PCP - General  Noah Brumfield MD as Assigned PCP    The following health maintenance items are reviewed in Epic and correct as of today:  Health Maintenance   Topic Date Due     HEPATITIS C SCREENING   "1950     ADVANCE CARE PLANNING  05/30/2017     ZOSTER IMMUNIZATION (2 of 3) 07/04/2017     AORTIC ANEURYSM SCREENING (SYSTEM ASSIGNED)  07/17/2017     PHQ-2  01/01/2019     MEDICARE ANNUAL WELLNESS VISIT  06/08/2019     INFLUENZA VACCINE (1) 09/01/2019     PNEUMOCOCCAL IMMUNIZATION 65+ LOW/MEDIUM RISK (2 of 2 - PPSV23) 06/08/2019     COLONOSCOPY  02/19/2020     FALL RISK ASSESSMENT  08/12/2020     DTAP/TDAP/TD IMMUNIZATION (3 - Td) 05/30/2022     LIPID  09/12/2024     IPV IMMUNIZATION  Aged Out     MENINGITIS IMMUNIZATION  Aged Out           ROS:  Review Of Systems  Skin: going to the dermatologist  Eyes: negative  Ears/Nose/Throat: negative  Respiratory: No shortness of breath, dyspnea on exertion, cough, or hemoptysis  Cardiovascular: negative  Gastrointestinal: negative  Genitourinary: having some ED.  Would like to try another medication.  He has tried viagra and cialis and had nasal congestion and headache    Musculoskeletal: knees are tender due to OA  Neurologic: negative  Psychiatric: negative  Hematologic/Lymphatic/Immunologic: negative  Endocrine: negative      OBJECTIVE:   /72   Pulse 62   Temp 98.1  F (36.7  C) (Tympanic)   Ht 1.765 m (5' 9.5\")   Wt 68.9 kg (152 lb)   SpO2 99%   BMI 22.12 kg/m   Estimated body mass index is 22.12 kg/m  as calculated from the following:    Height as of this encounter: 1.765 m (5' 9.5\").    Weight as of this encounter: 68.9 kg (152 lb).  EXAM:   GENERAL: healthy, alert and no distress  EYES: Eyes grossly normal to inspection, PERRL and conjunctivae and sclerae normal  HENT: ear canals and TM's normal, nose and mouth without ulcers or lesions  NECK: no adenopathy, no asymmetry, masses, or scars and thyroid normal to palpation  RESP: lungs clear to auscultation - no rales, rhonchi or wheezes  CV: regular rate and rhythm, normal S1 S2, no S3 or S4, no murmur, click or rub, no peripheral edema and peripheral pulses strong  ABDOMEN: soft, nontender, no " hepatosplenomegaly, no masses and bowel sounds normal   (male): normal male genitalia without lesions or urethral discharge, no hernia  MS: no gross musculoskeletal defects noted, no edema  SKIN: no suspicious lesions or rashes  NEURO: Normal strength and tone, mentation intact and speech normal  PSYCH: mentation appears normal, affect normal/bright  LYMPH: anterior cervical: no adenopathy  posterior cervical: no adenopathy        ASSESSMENT / PLAN:   (Z00.00) Encounter for Medicare annual wellness exam  (primary encounter diagnosis)  Comment: Hold on file until needed  Plan:     (N52.9) Erectile dysfunction, unspecified erectile dysfunction type  Comment: will send a new medication and an option for a 4th.  He will let us know if he wants to change  Plan: vardenafil (LEVITRA) 20 MG tablet, OFFICE/OUTPT        VISIT,EST,LEVL III            (I10) Essential hypertension, benign  Comment: controlled and refilled  Plan: losartan (COZAAR) 25 MG tablet            (I10) BENIGN HYPERTENSION  Comment: controlled  Plan: atenolol (TENORMIN) 25 MG tablet            (Z23) Need for prophylactic vaccination and inoculation against influenza  Comment:   Plan: INFLUENZA (HIGH DOSE) 3 VALENT VACCINE [97207],        Pneumococcal vaccine 23 valent PPSV23          (Pneumovax) [12151], ADMIN INFLUENZA (For         MEDICARE Patients ONLY) [], ADMIN         PNEUMOVAX VACCINE (For MEDICARE Patients ONLY)         []              End of Life Planning:  Patient currently has an advanced directive: No.  I have verified the patient's ablity to prepare an advanced directive/make health care decisions.  Literature was provided to assist patient in preparing an advanced directive.    COUNSELING:  Reviewed preventive health counseling, as reflected in patient instructions       Regular exercise       Healthy diet/nutrition       Vision screening       Hearing screening       Dental care       Colon cancer screening       Prostate cancer  "screening    Estimated body mass index is 22.12 kg/m  as calculated from the following:    Height as of this encounter: 1.765 m (5' 9.5\").    Weight as of this encounter: 68.9 kg (152 lb).         reports that he has quit smoking. He has never used smokeless tobacco.      Appropriate preventive services were discussed with this patient, including applicable screening as appropriate for cardiovascular disease, diabetes, osteopenia/osteoporosis, and glaucoma.  As appropriate for age/gender, discussed screening for colorectal cancer, prostate cancer, breast cancer, and cervical cancer. Checklist reviewing preventive services available has been given to the patient.    Reviewed patients plan of care and provided an AVS. The Basic Care Plan (routine screening as documented in Health Maintenance) for Cornel meets the Care Plan requirement. This Care Plan has been established and reviewed with the Patient.    Counseling Resources:  ATP IV Guidelines  Pooled Cohorts Equation Calculator  Breast Cancer Risk Calculator  FRAX Risk Assessment  ICSI Preventive Guidelines  Dietary Guidelines for Americans, 2010  USDA's MyPlate  ASA Prophylaxis  Lung CA Screening    Noah Brumfield MD  [facility]  "

## 2019-10-09 NOTE — NURSING NOTE
Prior to immunization administration, verified patients identity using patient s name and date of birth. Please see Immunization Activity for additional information.     Screening Questionnaire for Adult Immunization    Are you sick today?   No   Do you have allergies to medications, food, a vaccine component or latex?   No   Have you ever had a serious reaction after receiving a vaccination?   No   Do you have a long-term health problem with heart disease, lung disease, asthma, kidney disease, metabolic disease (e.g. diabetes), anemia, or other blood disorder?   No   Do you have cancer, leukemia, HIV/AIDS, or any other immune system problem?   No   In the past 3 months, have you taken medications that affect  your immune system, such as prednisone, other steroids, or anticancer drugs; drugs for the treatment of rheumatoid arthritis, Crohn s disease, or psoriasis; or have you had radiation treatments?   No   Have you had a seizure, or a brain or other nervous system problem?   No   During the past year, have you received a transfusion of blood or blood     products, or been given immune (gamma) globulin or antiviral drug?   No   For women: Are you pregnant or is there a chance you could become        pregnant during the next month?   No   Have you received any vaccinations in the past 4 weeks?   No     Immunization questionnaire answers were all negative.        Per orders of Dr. Brumfield, injection of PPSV23 and Flu vaccines given by Shanta Kelley CMA.   Patient instructed to remain in clinic for 15 minutes afterwards, and to report any adverse reaction to me immediately.       Screening performed by Shanta Kelley CMA on 10/9/2019 at 9:13 AM.

## 2019-10-10 ENCOUNTER — TELEPHONE (OUTPATIENT)
Dept: FAMILY MEDICINE | Facility: CLINIC | Age: 69
End: 2019-10-10

## 2019-10-10 DIAGNOSIS — N52.9 ERECTILE DYSFUNCTION, UNSPECIFIED ERECTILE DYSFUNCTION TYPE: Primary | ICD-10-CM

## 2019-10-10 NOTE — TELEPHONE ENCOUNTER
Prior Authorization Retail Medication Request    Medication/Dose: vardenafil  ICD code (if different than what is on RX):    Previously Tried and Failed:  Sildenafil 100 mg; cialis 20 mg  Rationale:  Genitourinary: having some ED.  Would like to try another medication.  He has tried viagra and cialis and had nasal congestion and headache     Insurance Name:  Not provided  Insurance ID:  Not provided  CMM Key: [id number]      Pharmacy Information (if different than what is on RX)  Name:    Phone:

## 2019-10-10 NOTE — TELEPHONE ENCOUNTER
VARDENAFIL is NOT covered. The generic of this is still very expensive $177.81 for QTY:6 .... SILDENAFIL QTY:30 is $35.00. Let us know

## 2019-10-10 NOTE — TELEPHONE ENCOUNTER
I got the message the levitra even though generic was expensive.  I sent the 4th alternative to the pharmacy.  I do not know what the cost is.  Please let us know if this medication is cheaper.  It is avanafil 200 mg.  He may try a quarter of a tab if it is cheaper.  Noah Brumfield MD  Family Medicine

## 2019-10-11 NOTE — TELEPHONE ENCOUNTER
Central Prior Authorization Team   Phone: [phone]      PA Initiation    Medication: vardenafil-DENIED  Insurance Company:    Pharmacy [place]illing the [place] [facility] [place] [address] [address]  Filling Pharmacy Phone: [phone]  Filling Pharmacy Fax:    Start Date: 10/11/2019    Started PA on CMM and a response of The medication you have requested is not covered by Medicare Part D Law.  Called and spoke with Kirti at [facility], medication is not covered under Medicare Part D law, no PA can be completed.

## 2019-10-11 NOTE — TELEPHONE ENCOUNTER
Left message for patient to return call to clinic. It also appears that the vardenafil was sent to PA team but we have not received a denial.     Odalis Donald, BSN, RN

## 2019-10-11 NOTE — TELEPHONE ENCOUNTER
PRIOR AUTHORIZATION DENIED    Medication: vardenafil-DENIED    Denial Date: 10/11/2019    Denial Rational: Medication is not covered under Medicare Part D law.    Appeal Information: N/A

## 2019-10-17 NOTE — TELEPHONE ENCOUNTER
I left a non-detailed message with woman who answered home phone for the patient to return call to clinic.  CSS may deliver message below.    Marianne LOCKETT RN

## 2019-10-17 NOTE — TELEPHONE ENCOUNTER
Please let the patient know the prior authorization was denied.  Noah Brumfield MD  Family Medicine

## 2019-10-17 NOTE — TELEPHONE ENCOUNTER
"Dr. Brumfield    PA for Vardenafil was denied.    Pharmacy indicated [facility] was denied coverage but, \"we have great cash prices here on sildenafil.\"    [facility]  "

## 2020-09-10 ENCOUNTER — ANCILLARY PROCEDURE (OUTPATIENT)
Dept: GENERAL RADIOLOGY | Facility: CLINIC | Age: 70
End: 2020-09-10
Attending: FAMILY MEDICINE
Payer: COMMERCIAL

## 2020-09-10 ENCOUNTER — OFFICE VISIT (OUTPATIENT)
Dept: ORTHOPEDICS | Facility: CLINIC | Age: 70
End: 2020-09-10
Payer: COMMERCIAL

## 2020-09-10 VITALS
SYSTOLIC BLOOD PRESSURE: 163 MMHG | WEIGHT: 159 LBS | BODY MASS INDEX: 22.76 KG/M2 | HEIGHT: 70 IN | DIASTOLIC BLOOD PRESSURE: 91 MMHG

## 2020-09-10 DIAGNOSIS — M25.561 ACUTE PAIN OF BOTH KNEES: ICD-10-CM

## 2020-09-10 DIAGNOSIS — M25.562 ACUTE PAIN OF BOTH KNEES: ICD-10-CM

## 2020-09-10 DIAGNOSIS — M17.0 OSTEOARTHRITIS OF BOTH KNEES, UNSPECIFIED OSTEOARTHRITIS TYPE: ICD-10-CM

## 2020-09-10 DIAGNOSIS — M25.562 ACUTE PAIN OF BOTH KNEES: Primary | ICD-10-CM

## 2020-09-10 DIAGNOSIS — M25.561 ACUTE PAIN OF BOTH KNEES: Primary | ICD-10-CM

## 2020-09-10 PROCEDURE — 73562 X-RAY EXAM OF KNEE 3: CPT

## 2020-09-10 PROCEDURE — 99204 OFFICE O/P NEW MOD 45 MIN: CPT | Performed by: FAMILY MEDICINE

## 2020-09-10 ASSESSMENT — MIFFLIN-ST. JEOR: SCORE: 1489.53

## 2020-09-10 NOTE — LETTER
9/10/2020         RE: Cornel Yuen  [address]  [place] [address]        Dear Colleague,    Thank you for referring your patient, oCrnel Yuen, to the [facility] [place]. Please see a copy of my visit note below.    Cornel Yuen  :  1950  DOS: 9/10/2020  MRN: [id number]    Sports Medicine Clinic Visit    PCP: Noah Brumfield    Cornel Yuen is a 70 year old male who is seen as a self referral presenting with bilateral knee pain.    Injury: No injury.  Pain located over bilateral knees, medial, nonradiating.  Additional Features:  Positive: swelling, popping and grinding.  Symptoms are better with Ice, Heat and Tylenol.  Symptoms are worse with: sitting, stairs and prolonged walking.  Other evaluation and/or treatments so far consists of: Ice, Heat, Tylenol, Ibuprofen, Rest and Elevation.  Recent imaging completed: No recent imaging completed.  Prior History of related problems: hx of meniscus surgery ~40 yrs ago    Social History: retired    Review of Systems  Musculoskeletal: as above  Remainder of review of systems is negative including constitutional, CV, pulmonary, GI, Skin and Neurologic except as noted in HPI or medical history.    Past Medical History:   Diagnosis Date     Actinic keratosis      Basal cell carcinoma      Essential hypertension, benign      Squamous cell carcinoma      Past Surgical History:   Procedure Laterality Date     C ANESTH,KNEE JOINT; NOS       HC REPAIR OF NASAL SEPTUM       HERNIA REPAIR, INGUINAL RT/LT       TONSILLECTOMY & ADENOIDECTOMY       Family History   Problem Relation Age of Onset     Cancer Father         brain tumor     Diabetes Maternal Grandmother      Alzheimer Disease Maternal Grandmother      C.A.D. Maternal Grandfather      Alzheimer Disease Maternal Grandfather      Unknown/Adopted Paternal Grandmother      C.A.D. Paternal Grandfather      Neurologic Disorder Mother         memory issues     Alzheimer  "Disease Mother      Asthma Daughter      Melanoma No family hx of        Objective  BP (!) 163/91   Ht 1.765 m (5' 9.5\")   Wt 72.1 kg (159 lb)   BMI 23.14 kg/m        General: healthy, alert and in no distress      HEENT: no scleral icterus or conjunctival erythema     Skin: no suspicious lesions or rash. No jaundice.     CV: regular rhythm by palpation, 2+ distal pulses, no pedal edema      Resp: normal respiratory effort without conversational dyspnea     Psych: normal mood and affect      Gait: mildly antalgic, appropriate coordination and balance     Neuro: normal light touch sensory exam of the extremities. Motor strength as noted below     Bilateral Knee exam    ROM:        Full active and passive ROM with flexion and extension       Mildly limited terminal ROM on R>L due to mechanical joint changes    Inspection:       no visible ecchymosis       no visible edema or effusion    Skin:       no visible deformities       well perfused       capillary refill brisk    Patellar Motion:        Normal patellar tracking noted through range of motion       Crepitus noted in the patellofemoral joint    Tender:        medial joint line    Non Tender:         remainder of knee area        medial patellar border        lateral patellar border        lateral joint line        along MCL        infrapatellar tendon        tibial tubercle    Special Tests:        neg (-) Lachman       neg (-) varus at 0 deg and 30 deg       neg (-) valgus at 0 deg and 30 deg       Mild pain with forced extension on the right    Evaluation of ipsilateral kinetic chain       normal strength with hip extension and abduction       Mildly decreased quad activation/conditioning, R>L      Radiology  Recent Results (from the past 744 hour(s))   XR Knee Standing Bilateral 3 Views    Narrative    KNEE STANDING BILATERAL THREE VIEWS  9/10/2020 9:44 AM     HISTORY: Acute pain of both knees.    COMPARISON: 1/7/2009.      Impression    IMPRESSION:   1. " Right knee: Marked medial joint space narrowing has progressed  since the prior exam. There is associated mild medial marginal bony  spurring. No acute-appearing bony or soft tissue abnormality and no  joint space effusion.    2. Left knee: Moderate medial joint space narrowing without  significant change. No acute bony or soft tissue abnormality or joint  space effusion.    EDER SERRANO MD       Assessment:  1. Acute pain of both knees    2. Osteoarthritis of both knees, unspecified osteoarthritis type        Plan:  Discussed the assessment with the patient.  Follow up: prn based on sx severity  Pain is recently better, seems to wax and wane  Reviewed prior images  XR images independently visualized and reviewed with patient today in clinic  R>L medial compartment DJD, right mildly worsened compared to prior imaging  Likely right is progressed faster due to old injury and meniscectomy  We discussed modified progressive pain-free activity as tolerated  Reviewed wt loss, activity modification and progressive increase in activity as tolerated and guided by pain  Reviewed options for potential steroid vs viscosupplementation injections and the possibility for future orthopedic referral prn  Reviewed safe and appropriate OTC medication choices, try tylenol first  Up to 3000mg daily of tylenol is generally safe, NSAID dosing and duration limitations reviewed  Discussed nature of degenerative arthrosis of the knee.   Discussed symptom treatment with ice or heat, topical treatments, and rest if needed.   Home handouts provided and supportive care reviewed  All questions were answered today  Contact us with additional questions or concerns  Signs and sx of concern reviewed      Elmo Castro DO, BILL  Primary Care Sports Medicine  [facility]               Disclaimer: This note consists of symbols derived from keyboarding, dictation and/or voice recognition software. As a result, there may be errors  in the script that have gone undetected. Please consider this when interpreting information found in this chart.    Again, thank you for allowing me to participate in the care of your patient.        Sincerely,        Elmo Castro, DO

## 2020-09-10 NOTE — PROGRESS NOTES
"Cornel Yuen  :  1950  DOS: 9/10/2020  MRN: [id number]    Sports Medicine Clinic Visit    PCP: Noah Brumfield    Cornel Yuen is a 70 year old male who is seen as a self referral presenting with bilateral knee pain.    Injury: No injury.  Pain located over bilateral knees, medial, nonradiating.  Additional Features:  Positive: swelling, popping and grinding.  Symptoms are better with Ice, Heat and Tylenol.  Symptoms are worse with: sitting, stairs and prolonged walking.  Other evaluation and/or treatments so far consists of: Ice, Heat, Tylenol, Ibuprofen, Rest and Elevation.  Recent imaging completed: No recent imaging completed.  Prior History of related problems: hx of meniscus surgery ~40 yrs ago    Social History: retired    Review of Systems  Musculoskeletal: as above  Remainder of review of systems is negative including constitutional, CV, pulmonary, GI, Skin and Neurologic except as noted in HPI or medical history.    Past Medical History:   Diagnosis Date     Actinic keratosis      Basal cell carcinoma      Essential hypertension, benign      Squamous cell carcinoma      Past Surgical History:   Procedure Laterality Date     C ANESTH,KNEE JOINT; NOS       HC REPAIR OF NASAL SEPTUM       HERNIA REPAIR, INGUINAL RT/LT       TONSILLECTOMY & ADENOIDECTOMY       Family History   Problem Relation Age of Onset     Cancer Father         brain tumor     Diabetes Maternal Grandmother      Alzheimer Disease Maternal Grandmother      C.A.D. Maternal Grandfather      Alzheimer Disease Maternal Grandfather      Unknown/Adopted Paternal Grandmother      C.A.D. Paternal Grandfather      Neurologic Disorder Mother         memory issues     Alzheimer Disease Mother      Asthma Daughter      Melanoma No family hx of        Objective  BP (!) 163/91   Ht 1.765 m (5' 9.5\")   Wt 72.1 kg (159 lb)   BMI 23.14 kg/m        General: healthy, alert and in no distress      HEENT: no scleral icterus or conjunctival " erythema     Skin: no suspicious lesions or rash. No jaundice.     CV: regular rhythm by palpation, 2+ distal pulses, no pedal edema      Resp: normal respiratory effort without conversational dyspnea     Psych: normal mood and affect      Gait: mildly antalgic, appropriate coordination and balance     Neuro: normal light touch sensory exam of the extremities. Motor strength as noted below     Bilateral Knee exam    ROM:        Full active and passive ROM with flexion and extension       Mildly limited terminal ROM on R>L due to mechanical joint changes    Inspection:       no visible ecchymosis       no visible edema or effusion    Skin:       no visible deformities       well perfused       capillary refill brisk    Patellar Motion:        Normal patellar tracking noted through range of motion       Crepitus noted in the patellofemoral joint    Tender:        medial joint line    Non Tender:         remainder of knee area        medial patellar border        lateral patellar border        lateral joint line        along MCL        infrapatellar tendon        tibial tubercle    Special Tests:        neg (-) Lachman       neg (-) varus at 0 deg and 30 deg       neg (-) valgus at 0 deg and 30 deg       Mild pain with forced extension on the right    Evaluation of ipsilateral kinetic chain       normal strength with hip extension and abduction       Mildly decreased quad activation/conditioning, R>L      Radiology  Recent Results (from the past 744 hour(s))   XR Knee Standing Bilateral 3 Views    Narrative    KNEE STANDING BILATERAL THREE VIEWS  9/10/2020 9:44 AM     HISTORY: Acute pain of both knees.    COMPARISON: 1/7/2009.      Impression    IMPRESSION:   1. Right knee: Marked medial joint space narrowing has progressed  since the prior exam. There is associated mild medial marginal bony  spurring. No acute-appearing bony or soft tissue abnormality and no  joint space effusion.    2. Left knee: Moderate medial  joint space narrowing without  significant change. No acute bony or soft tissue abnormality or joint  space effusion.    EDER SERRANO MD       Assessment:  1. Acute pain of both knees    2. Osteoarthritis of both knees, unspecified osteoarthritis type        Plan:  Discussed the assessment with the patient.  Follow up: prn based on sx severity  Pain is recently better, seems to wax and wane  Reviewed prior images  XR images independently visualized and reviewed with patient today in clinic  R>L medial compartment DJD, right mildly worsened compared to prior imaging  Likely right is progressed faster due to old injury and meniscectomy  We discussed modified progressive pain-free activity as tolerated  Reviewed wt loss, activity modification and progressive increase in activity as tolerated and guided by pain  Reviewed options for potential steroid vs viscosupplementation injections and the possibility for future orthopedic referral prn  Reviewed safe and appropriate OTC medication choices, try tylenol first  Up to 3000mg daily of tylenol is generally safe, NSAID dosing and duration limitations reviewed  Discussed nature of degenerative arthrosis of the knee.   Discussed symptom treatment with ice or heat, topical treatments, and rest if needed.   Home handouts provided and supportive care reviewed  All questions were answered today  Contact us with additional questions or concerns  Signs and sx of concern reviewed      Elmo Castro DO, BILL  Primary Care Sports Medicine  [facility]               Disclaimer: This note consists of symbols derived from keyboarding, dictation and/or voice recognition software. As a result, there may be errors in the script that have gone undetected. Please consider this when interpreting information found in this chart.

## 2020-10-10 ENCOUNTER — IMMUNIZATION (OUTPATIENT)
Dept: FAMILY MEDICINE | Facility: CLINIC | Age: 70
End: 2020-10-10
Payer: COMMERCIAL

## 2020-10-10 PROCEDURE — 90662 IIV NO PRSV INCREASED AG IM: CPT

## 2020-10-10 PROCEDURE — G0008 ADMIN INFLUENZA VIRUS VAC: HCPCS

## 2020-10-14 ENCOUNTER — OFFICE VISIT (OUTPATIENT)
Dept: DERMATOLOGY | Facility: CLINIC | Age: 70
End: 2020-10-14
Payer: COMMERCIAL

## 2020-10-14 VITALS — HEART RATE: 61 BPM | DIASTOLIC BLOOD PRESSURE: 82 MMHG | SYSTOLIC BLOOD PRESSURE: 153 MMHG | OXYGEN SATURATION: 94 %

## 2020-10-14 DIAGNOSIS — L82.1 SEBORRHEIC KERATOSIS: ICD-10-CM

## 2020-10-14 DIAGNOSIS — D18.01 ANGIOMA OF SKIN: ICD-10-CM

## 2020-10-14 DIAGNOSIS — Z85.828 HISTORY OF SKIN CANCER: ICD-10-CM

## 2020-10-14 DIAGNOSIS — D23.9 DERMAL NEVUS: ICD-10-CM

## 2020-10-14 DIAGNOSIS — L81.4 LENTIGO: Primary | ICD-10-CM

## 2020-10-14 DIAGNOSIS — L57.0 AK (ACTINIC KERATOSIS): ICD-10-CM

## 2020-10-14 DIAGNOSIS — H61.002 CHONDRODERMATITIS NODULARIS CHRONICA HELICIS, LEFT: ICD-10-CM

## 2020-10-14 PROCEDURE — 99213 OFFICE O/P EST LOW 20 MIN: CPT | Mod: 25 | Performed by: DERMATOLOGY

## 2020-10-14 PROCEDURE — 17000 DESTRUCT PREMALG LESION: CPT | Performed by: DERMATOLOGY

## 2020-10-14 NOTE — PROGRESS NOTES
Cornel Yuen is a 70 year old year old male patient here today for spot on left ear.   .  Patient states this has been present for a while.  Patient reports the following symptoms:  tender.  Patient reports the following previous treatments none.  These treatments did not work.  Patient reports the following modifying factors none.  Associated symptoms: none.  Patient has no other skin complaints today.  Remainder of the HPI, Meds, PMH, Allergies, FH, and SH was reviewed in chart.      Past Medical History:   Diagnosis Date     Actinic keratosis      Basal cell carcinoma      Essential hypertension, benign      Squamous cell carcinoma        Past Surgical History:   Procedure Laterality Date     C ANESTH,KNEE JOINT; NOS       HC REPAIR OF NASAL SEPTUM       HERNIA REPAIR, INGUINAL RT/LT       TONSILLECTOMY & ADENOIDECTOMY          Family History   Problem Relation Age of Onset     Cancer Father         brain tumor     Diabetes Maternal Grandmother      Alzheimer Disease Maternal Grandmother      C.A.D. Maternal Grandfather      Alzheimer Disease Maternal Grandfather      Unknown/Adopted Paternal Grandmother      C.A.D. Paternal Grandfather      Neurologic Disorder Mother         memory issues     Alzheimer Disease Mother      Asthma Daughter      Melanoma No family hx of        Social History     Socioeconomic History     Marital status:      Spouse name: Not on file     Number of children: Not on file     Years of education: Not on file     Highest education level: Not on file   Occupational History     Not on file   Social Needs     Financial resource strain: Not on file     Food insecurity     Worry: Not on file     Inability: Not on file     Transportation needs     Medical: Not on file     Non-medical: Not on file   Tobacco Use     Smoking status: Former Smoker     Smokeless tobacco: Never Used   Substance and Sexual Activity     Alcohol use: Yes     Comment: 2-3 scotch per night     Drug use: Yes      Comment: occasional- marijuana     Sexual activity: Yes     Partners: Female     Birth control/protection: Surgical     Comment: vasectomy   Lifestyle     Physical activity     Days per week: Not on file     Minutes per session: Not on file     Stress: Not on file   Relationships     Social connections     Talks on phone: Not on file     Gets together: Not on file     Attends Ep[employer]alian service: Not on file     Active member of club or organization: Not on file     Attends meetings of clubs or organizations: Not on file     Relationship status: Not on file     Intimate partner violence     Fear of current or ex partner: Not on file     Emotionally abused: Not on file     Physically abused: Not on file     Forced sexual activity: Not on file   Other Topics Concern     Parent/sibling w/ CABG, MI or angioplasty before 65F 55M? No   Social History Narrative     Not on file       Outpatient Encounter Medications as of 10/14/2020   Medication Sig Dispense Refill     ASPIRIN 81 MG OR TABS 1 TABLET DAILY       atenolol (TENORMIN) 25 MG tablet Take 1 tablet (25 mg) by mouth daily 90 tablet 3     Avanafil 200 MG TABS Take 100-200 mg by mouth See Admin Instructions Take 30 minutes prior to sexual intercourse.  (see if this is cheaper) 6 tablet 11     GLUCOSAMINE-CHONDROITIN 750-600 MG OR TABS 1 tab daily 0 0     losartan (COZAAR) 25 MG tablet Take 0.5 tablets (12.5 mg) by mouth daily 45 tablet 3     No facility-administered encounter medications on file as of 10/14/2020.              Review Of Systems  Skin: As above  Eyes: negative  Ears/Nose/Throat: negative  Respiratory: No shortness of breath, dyspnea on exertion, cough, or hemoptysis  Cardiovascular: negative  Gastrointestinal: negative  Genitourinary: negative  Musculoskeletal: negative  Neurologic: negative  Psychiatric: negative  Hematologic/Lymphatic/Immunologic: negative  Endocrine: negative      O:   NAD, WDWN, Alert & Oriented, Mood & Affect wnl, Vitals  stable   Here today alone   BP (!) 153/82   Pulse 61   SpO2 94%    General appearance normal   Vitals stable   Alert, oriented and in no acute distress      Following lymph nodes palpated: Occipital, Cervical, Supraclavicular no lad   L helix white scaly papule    L zygoma gritty scaly papule      Stuck on papules and brown macules on trunk and ext   Red papules on trunk  Flesh colored papules on trunk     The remainder of the full exam was normal; the following areas were examined:  conjunctiva/lids, oral mucosa, neck, peripheral vascular system, abdomen, lymph nodes, digits/nails, eccrine and apocrine glands, scalp/hair, face, neck, chest, abdomen, buttocks, back, RUE, LUE, RLE, LLE       Eyes: Conjunctivae/lids:Normal     ENT: Lips, buccal mucosa, tongue: normal    MSK:Normal    Cardiovascular: peripheral edema none    Pulm: Breathing Normal    Lymph Nodes: No Head and Neck Lymphadenopathy     Neuro/Psych: Orientation:Alert and Orientedx3 ; Mood/Affect:normal       A/P:  1. Seborrheic keratosis, lentigo, angioma, dermal nevus, hx of non-melanoma skin cancer   2. cdnh  Pathophysiology discussed with pateint   He declines il tac, excision  Pressure off loading discussed with patient   3. L zygoma actinic keratosis   LN2:  Treated with LN2 for 5s for 1-2 cycles. Warned risks of blistering, pain, pigment change, scarring, and incomplete resolution.  Advised patient to return if lesions do not completely resolve.  Wound care sheet given.    BENIGN LESIONS DISCUSSED WITH PATIENT:  I discussed the specifics of tumor, prognosis, and genetics of benign lesions.  I explained that treatment of these lesions would be purely cosmetic and not medically neccessary.  I discussed with patient different removal options including excision, cautery and /or laser.      Nature and genetics of benign skin lesions dicussed with patient.  Signs and Symptoms of skin cancer discussed with patient.  Patient encouraged to perform monthly  skin exams.  UV precautions reviewed with patient.  Skin care regimen reviewed with patient: Eliminate harsh soaps, i.e. Dial, zest, irsih spring; Mild soaps such as Cetaphil or Dove sensitive skin, avoid hot or cold showers, aggressive use of emollients including vanicream, cetaphil or cerave discussed with patient.    Risks of non-melanoma skin cancer discussed with patient   Return to clinic 12 months

## 2020-10-14 NOTE — LETTER
10/14/2020         RE: Cornel Yuen  [address]  [place] [address]        Dear Colleague,    Thank you for referring your patient, Cornel Yuen, to the [facility]. Please see a copy of my visit note below.    Cornel Yuen is a 70 year old year old male patient here today for spot on left ear.   .  Patient states this has been present for a while.  Patient reports the following symptoms:  tender.  Patient reports the following previous treatments none.  These treatments did not work.  Patient reports the following modifying factors none.  Associated symptoms: none.  Patient has no other skin complaints today.  Remainder of the HPI, Meds, PMH, Allergies, FH, and SH was reviewed in chart.      Past Medical History:   Diagnosis Date     Actinic keratosis      Basal cell carcinoma      Essential hypertension, benign      Squamous cell carcinoma        Past Surgical History:   Procedure Laterality Date     C ANESTH,KNEE JOINT; NOS       HC REPAIR OF NASAL SEPTUM       HERNIA REPAIR, INGUINAL RT/LT       TONSILLECTOMY & ADENOIDECTOMY          Family History   Problem Relation Age of Onset     Cancer Father         brain tumor     Diabetes Maternal Grandmother      Alzheimer Disease Maternal Grandmother      C.A.D. Maternal Grandfather      Alzheimer Disease Maternal Grandfather      Unknown/Adopted Paternal Grandmother      C.A.D. Paternal Grandfather      Neurologic Disorder Mother         memory issues     Alzheimer Disease Mother      Asthma Daughter      Melanoma No family hx of        Social History     Socioeconomic History     Marital status:      Spouse name: Not on file     Number of children: Not on file     Years of education: Not on file     Highest education level: Not on file   Occupational History     Not on file   Social Needs     Financial resource strain: Not on file     Food insecurity     Worry: Not on file     Inability: Not on file     Transportation  needs     Medical: Not on file     Non-medical: Not on file   Tobacco Use     Smoking status: Former Smoker     Smokeless tobacco: Never Used   Substance and Sexual Activity     Alcohol use: Yes     Comment: 2-3 scotch per night     Drug use: Yes     Comment: occasional- marijuana     Sexual activity: Yes     Partners: Female     Birth control/protection: Surgical     Comment: vasectomy   Lifestyle     Physical activity     Days per week: Not on file     Minutes per session: Not on file     Stress: Not on file   Relationships     Social connections     Talks on phone: Not on file     Gets together: Not on file     Attends Shinto service: Not on file     Active member of club or organization: Not on file     Attends meetings of clubs or organizations: Not on file     Relationship status: Not on file     Intimate partner violence     Fear of current or ex partner: Not on file     Emotionally abused: Not on file     Physically abused: Not on file     Forced sexual activity: Not on file   Other Topics Concern     Parent/sibling w/ CABG, MI or angioplasty before 65F 55M? No   Social History Narrative     Not on file       Outpatient Encounter Medications as of 10/14/2020   Medication Sig Dispense Refill     ASPIRIN 81 MG OR TABS 1 TABLET DAILY       atenolol (TENORMIN) 25 MG tablet Take 1 tablet (25 mg) by mouth daily 90 tablet 3     Avanafil 200 MG TABS Take 100-200 mg by mouth See Admin Instructions Take 30 minutes prior to sexual intercourse.  (see if this is cheaper) 6 tablet 11     GLUCOSAMINE-CHONDROITIN 750-600 MG OR TABS 1 tab daily 0 0     losartan (COZAAR) 25 MG tablet Take 0.5 tablets (12.5 mg) by mouth daily 45 tablet 3     No facility-administered encounter medications on file as of 10/14/2020.              Review Of Systems  Skin: As above  Eyes: negative  Ears/Nose/Throat: negative  Respiratory: No shortness of breath, dyspnea on exertion, cough, or hemoptysis  Cardiovascular:  negative  Gastrointestinal: negative  Genitourinary: negative  Musculoskeletal: negative  Neurologic: negative  Psychiatric: negative  Hematologic/Lymphatic/Immunologic: negative  Endocrine: negative      O:   NAD, WDWN, Alert & Oriented, Mood & Affect wnl, Vitals stable   Here today alone   BP (!) 153/82   Pulse 61   SpO2 94%    General appearance normal   Vitals stable   Alert, oriented and in no acute distress      Following lymph nodes palpated: Occipital, Cervical, Supraclavicular no lad   L helix white scaly papule    L zygoma gritty scaly papule      Stuck on papules and brown macules on trunk and ext   Red papules on trunk  Flesh colored papules on trunk     The remainder of the full exam was normal; the following areas were examined:  conjunctiva/lids, oral mucosa, neck, peripheral vascular system, abdomen, lymph nodes, digits/nails, eccrine and apocrine glands, scalp/hair, face, neck, chest, abdomen, buttocks, back, RUE, LUE, RLE, LLE       Eyes: Conjunctivae/lids:Normal     ENT: Lips, buccal mucosa, tongue: normal    MSK:Normal    Cardiovascular: peripheral edema none    Pulm: Breathing Normal    Lymph Nodes: No Head and Neck Lymphadenopathy     Neuro/Psych: Orientation:Alert and Orientedx3 ; Mood/Affect:normal       A/P:  1. Seborrheic keratosis, lentigo, angioma, dermal nevus, hx of non-melanoma skin cancer   2. cdnh  Pathophysiology discussed with pateint   He declines il tac, excision  Pressure off loading discussed with patient   3. L zygoma actinic keratosis   LN2:  Treated with LN2 for 5s for 1-2 cycles. Warned risks of blistering, pain, pigment change, scarring, and incomplete resolution.  Advised patient to return if lesions do not completely resolve.  Wound care sheet given.    BENIGN LESIONS DISCUSSED WITH PATIENT:  I discussed the specifics of tumor, prognosis, and genetics of benign lesions.  I explained that treatment of these lesions would be purely cosmetic and not medically neccessary.   I discussed with patient different removal options including excision, cautery and /or laser.      Nature and genetics of benign skin lesions dicussed with patient.  Signs and Symptoms of skin cancer discussed with patient.  Patient encouraged to perform monthly skin exams.  UV precautions reviewed with patient.  Skin care regimen reviewed with patient: Eliminate harsh soaps, i.e. Dial, zest, irsih spring; Mild soaps such as Cetaphil or Dove sensitive skin, avoid hot or cold showers, aggressive use of emollients including vanicream, cetaphil or cerave discussed with patient.    Risks of non-melanoma skin cancer discussed with patient   Return to clinic 12 months        Again, thank you for allowing me to participate in the care of your patient.        Sincerely,        Edin De La Cruz MD

## 2020-10-26 ENCOUNTER — TELEPHONE (OUTPATIENT)
Dept: FAMILY MEDICINE | Facility: CLINIC | Age: 70
End: 2020-10-26

## 2020-10-26 DIAGNOSIS — I10 ESSENTIAL HYPERTENSION, BENIGN: ICD-10-CM

## 2020-10-26 NOTE — LETTER
October 28, 2020      Cornel Yuen  [address]   [place] [address]        Dear Cornel,       We received a refill request for your losartan medication.  This medication has been refilled for 30 days as you are due for an office visit for further refills.  Please call [phone] to schedule an appointment.      Sincerely,        Noah Brumfield MD

## 2020-10-26 NOTE — TELEPHONE ENCOUNTER
"Requested Prescriptions   Pending Prescriptions Disp Refills     losartan (COZAAR) 25 MG tablet [Pharmacy Med Name: losartan 25 mg tablet] 45 tablet 3     Sig: Take 1/2 tablet BY MOUTH EVERY DAY       Angiotensin-II Receptors Failed - 10/26/2020 10:13 AM        Failed - Last blood pressure under 140/90 in past 12 months     BP Readings from Last 3 Encounters:   10/14/20 (!) 153/82   09/10/20 (!) 163/91   10/09/19 136/72                 Failed - Normal serum creatinine on file in past 12 months     Recent Labs   Lab Test 09/12/19  0718   CR 0.97       Ok to refill medication if creatinine is low          Failed - Normal serum potassium on file in past 12 months     Recent Labs   Lab Test 09/12/19  0718   POTASSIUM 4.0                    Passed - Recent (12 mo) or future (30 days) visit within the authorizing provider's specialty     Patient has had an office visit with the authorizing provider or a provider within the authorizing providers department within the previous 12 mos or has a future within next 30 days. See \"Patient Info\" tab in inbasket, or \"Choose Columns\" in Meds & Orders section of the refill encounter.              Passed - Medication is active on med list        Passed - Patient is age 18 or older             "

## 2020-10-28 RX ORDER — LOSARTAN POTASSIUM 25 MG/1
TABLET ORAL
Qty: 45 TABLET | Refills: 0 | Status: SHIPPED | OUTPATIENT
Start: 2020-10-28 | End: 2020-11-13

## 2020-10-28 NOTE — TELEPHONE ENCOUNTER
Routing refill request to provider for review/approval because:  Labs not current:    Creatinine   Date Value Ref Range Status   09/12/2019 0.97 0.66 - 1.25 mg/dL Final     Potassium   Date Value Ref Range Status   09/12/2019 4.0 3.4 - 5.3 mmol/L Final     Due for clinic visit.      RENAN RomanN, RN

## 2020-10-28 NOTE — TELEPHONE ENCOUNTER
Covering for PCP (out of clinic today):  One refill sent.  Please advise him to schedule follow-up appointment.    Thanks,  Matthew Hernández MD

## 2020-10-30 DIAGNOSIS — E78.5 HYPERLIPIDEMIA LDL GOAL <130: Primary | ICD-10-CM

## 2020-10-30 DIAGNOSIS — Z12.5 SCREENING FOR PROSTATE CANCER: ICD-10-CM

## 2020-10-30 DIAGNOSIS — I10 ESSENTIAL HYPERTENSION, BENIGN: ICD-10-CM

## 2020-10-30 NOTE — TELEPHONE ENCOUNTER
"Requested Prescriptions   Pending Prescriptions Disp Refills     atenolol (TENORMIN) 25 MG tablet [Pharmacy Med Name: atenolol 25 mg tablet] 90 tablet 3     Sig: Take 1 Tablet BY MOUTH EVERY DAY       Beta-Blockers Protocol Failed - 10/30/2020  8:00 AM        Failed - Blood pressure under 140/90 in past 12 months     BP Readings from Last 3 Encounters:   10/14/20 (!) 153/82   09/10/20 (!) 163/91   10/09/19 136/72                 Passed - Patient is age 6 or older        Passed - Recent (12 mo) or future (30 days) visit within the authorizing provider's specialty     Patient has had an office visit with the authorizing provider or a provider within the authorizing providers department within the previous 12 mos or has a future within next 30 days. See \"Patient Info\" tab in inbasket, or \"Choose Columns\" in Meds & Orders section of the refill encounter.              Passed - Medication is active on med list             "

## 2020-11-04 RX ORDER — ATENOLOL 25 MG/1
25 TABLET ORAL DAILY
Qty: 90 TABLET | Refills: 0 | Status: SHIPPED | OUTPATIENT
Start: 2020-11-04 | End: 2020-11-13

## 2020-11-04 NOTE — TELEPHONE ENCOUNTER
Please have him get his fasting labs done and make an office visit.  Noah Brumfield MD  Family Medicine

## 2020-11-12 DIAGNOSIS — Z12.5 SCREENING FOR PROSTATE CANCER: ICD-10-CM

## 2020-11-12 DIAGNOSIS — I10 ESSENTIAL HYPERTENSION, BENIGN: ICD-10-CM

## 2020-11-12 DIAGNOSIS — E78.5 HYPERLIPIDEMIA LDL GOAL <130: ICD-10-CM

## 2020-11-12 LAB
ANION GAP SERPL CALCULATED.3IONS-SCNC: 5 MMOL/L (ref 3–14)
BUN SERPL-MCNC: 20 MG/DL (ref 7–30)
CALCIUM SERPL-MCNC: 9.2 MG/DL (ref 8.5–10.1)
CHLORIDE SERPL-SCNC: 108 MMOL/L (ref 94–109)
CHOLEST SERPL-MCNC: 255 MG/DL
CO2 SERPL-SCNC: 27 MMOL/L (ref 20–32)
CREAT SERPL-MCNC: 0.85 MG/DL (ref 0.66–1.25)
GFR SERPL CREATININE-BSD FRML MDRD: 89 ML/MIN/{1.73_M2}
GLUCOSE SERPL-MCNC: 89 MG/DL (ref 70–99)
HDLC SERPL-MCNC: 71 MG/DL
LDLC SERPL CALC-MCNC: 156 MG/DL
NONHDLC SERPL-MCNC: 184 MG/DL
POTASSIUM SERPL-SCNC: 4.1 MMOL/L (ref 3.4–5.3)
PSA SERPL-ACNC: 0.74 UG/L (ref 0–4)
SODIUM SERPL-SCNC: 140 MMOL/L (ref 133–144)
TRIGL SERPL-MCNC: 138 MG/DL

## 2020-11-12 PROCEDURE — G0103 PSA SCREENING: HCPCS | Performed by: FAMILY MEDICINE

## 2020-11-12 PROCEDURE — 80048 BASIC METABOLIC PNL TOTAL CA: CPT | Performed by: FAMILY MEDICINE

## 2020-11-12 PROCEDURE — 80061 LIPID PANEL: CPT | Performed by: FAMILY MEDICINE

## 2020-11-13 ENCOUNTER — OFFICE VISIT (OUTPATIENT)
Dept: FAMILY MEDICINE | Facility: CLINIC | Age: 70
End: 2020-11-13
Payer: COMMERCIAL

## 2020-11-13 VITALS
TEMPERATURE: 98 F | RESPIRATION RATE: 18 BRPM | WEIGHT: 155 LBS | SYSTOLIC BLOOD PRESSURE: 142 MMHG | HEIGHT: 70 IN | OXYGEN SATURATION: 99 % | HEART RATE: 74 BPM | BODY MASS INDEX: 22.19 KG/M2 | DIASTOLIC BLOOD PRESSURE: 72 MMHG

## 2020-11-13 DIAGNOSIS — I10 ESSENTIAL HYPERTENSION, BENIGN: ICD-10-CM

## 2020-11-13 DIAGNOSIS — Z00.00 ENCOUNTER FOR MEDICARE ANNUAL WELLNESS EXAM: Primary | ICD-10-CM

## 2020-11-13 DIAGNOSIS — Z12.11 SPECIAL SCREENING FOR MALIGNANT NEOPLASMS, COLON: ICD-10-CM

## 2020-11-13 DIAGNOSIS — E78.5 HYPERLIPIDEMIA LDL GOAL <100: ICD-10-CM

## 2020-11-13 DIAGNOSIS — N52.9 ERECTILE DYSFUNCTION, UNSPECIFIED ERECTILE DYSFUNCTION TYPE: ICD-10-CM

## 2020-11-13 PROCEDURE — G0438 PPPS, INITIAL VISIT: HCPCS | Performed by: FAMILY MEDICINE

## 2020-11-13 PROCEDURE — 99213 OFFICE O/P EST LOW 20 MIN: CPT | Mod: 25 | Performed by: FAMILY MEDICINE

## 2020-11-13 RX ORDER — ATORVASTATIN CALCIUM 10 MG/1
10 TABLET, FILM COATED ORAL DAILY
Qty: 90 TABLET | Refills: 3 | Status: SHIPPED | OUTPATIENT
Start: 2020-11-13 | End: 2021-11-01

## 2020-11-13 RX ORDER — LOSARTAN POTASSIUM 25 MG/1
25 TABLET ORAL DAILY
Qty: 90 TABLET | Refills: 3 | Status: SHIPPED | OUTPATIENT
Start: 2020-11-13 | End: 2021-12-17

## 2020-11-13 RX ORDER — ATENOLOL 25 MG/1
25 TABLET ORAL DAILY
Qty: 90 TABLET | Refills: 3 | Status: SHIPPED | OUTPATIENT
Start: 2020-11-13 | End: 2021-12-17

## 2020-11-13 RX ORDER — LOSARTAN POTASSIUM 25 MG/1
12.5 TABLET ORAL DAILY
Qty: 45 TABLET | Refills: 3 | Status: CANCELLED | OUTPATIENT
Start: 2020-11-13

## 2020-11-13 ASSESSMENT — PAIN SCALES - GENERAL: PAINLEVEL: NO PAIN (0)

## 2020-11-13 ASSESSMENT — ACTIVITIES OF DAILY LIVING (ADL): CURRENT_FUNCTION: NO ASSISTANCE NEEDED

## 2020-11-13 ASSESSMENT — MIFFLIN-ST. JEOR: SCORE: 1471.39

## 2020-11-13 NOTE — PATIENT INSTRUCTIONS
Please drop off Fit kit for colon screening.     Refilled your medications.    Component      Latest Ref Rng & Units 11/12/2020   Sodium      133 - 144 mmol/L 140   Potassium      3.4 - 5.3 mmol/L 4.1   Chloride      94 - 109 mmol/L 108   Carbon Dioxide      20 - 32 mmol/L 27   Anion Gap      3 - 14 mmol/L 5   Glucose      70 - 99 mg/dL 89   Urea Nitrogen      7 - 30 mg/dL 20   Creatinine      0.66 - 1.25 mg/dL 0.85   GFR Estimate      >60 mL/min/1.73:m2 89   GFR Estimate If Black      >60 mL/min/1.73:m2 >90   Calcium      8.5 - 10.1 mg/dL 9.2   Cholesterol      <200 mg/dL 255 (H)   Triglycerides      <150 mg/dL 138   HDL Cholesterol      >39 mg/dL 71   LDL Cholesterol Calculated      <100 mg/dL 156 (H)   Non HDL Cholesterol      <130 mg/dL 184 (H)   PSA      0 - 4 ug/L 0.74       The 10-year ASCVD risk score (Rosanna GREWAL Jr., et al., 2013) is: 20.9%    Values used to calculate the score:      Age: 70 years      Sex: Male      Is Non- : No      Diabetic: No      Tobacco smoker: No      Systolic Blood Pressure: 142 mmHg      Is BP treated: Yes      HDL Cholesterol: 71 mg/dL      Total Cholesterol: 255 mg/dL      I do recommend to lower your cholesterol level.  Start with atorvastatin 10 mg once a day.  Follow up with a fasting lab in 3 months.    For your blood pressure please increase your losartan medication to 25 mg once a day.  Follow up in 4 weeks with a telephone visit.    Please be aware that there will be an additional charge during your preventative visit due to either a new diagnosis and/or chronic disease management.    Preventative visits screen for diseases prior to they occur.  They do not cover for any new diagnosis or chronic disease management which would include medication refills, labs etc.    If you have questions regarding your coverage please check with your insurance provider.  At [facility] we need to code correctly to be in compliance with all insurance  companies.          Thank you for choosing [facility].  You may be receiving an email and/or telephone survey request from [facility] Customer Experience regarding your visit today.  Please take a few minutes to respond to the survey to let us know how we are doing.      If you have questions or concerns, please contact us via [employer] or you can contact your care team at [phone].    Our Clinic hours are:  Monday 6:40 am  to 7:00 pm  Tuesday -Friday 6:40 am to 5:00 pm    The [place] outpatient lab hours are:  Monday - Friday 6:10 am to 4:45 pm  Saturdays 7:00 am to 11:00 am  Appointments are required, call [phone]    If you have clinical questions after hours or would like to schedule an appointment,  call the clinic at [phone].          Patient Education   Personalized Prevention Plan  You are due for the preventive services outlined below.  Your care team is available to assist you in scheduling these services.  If you have already completed any of these items, please share that information with your care team to update in your medical record.  Health Maintenance Due   Topic Date Due     Hepatitis C Screening  07/17/1968     Zoster (Shingles) Vaccine (2 of 3) 07/04/2017     AORTIC ANEURYSM SCREENING (SYSTEM ASSIGNED)  07/17/2017     Colorectal Cancer Screening  02/19/2020     FALL RISK ASSESSMENT  10/09/2020       Exercise for a Healthier Heart     Exercise with a friend. When activity is fun, you're more likely to stick with it.   You may wonder how you can improve the health of your heart. If you re thinking about exercise, you re on the right track. You don t need to become an athlete. But you do need a certain amount of brisk exercise to help strengthen your heart. If you have been diagnosed with a heart condition, your healthcare provider may advise exercise to help stabilize your condition. To help make exercise a habit, choose safe, fun activities.   Before you start  Check with your  healthcare provider before starting an exercise program. This is especially important if you have not been active for a while. It's also important if you have a long-term (chronic) health problem such as heart disease, diabetes, or obesity. Or if you are at high risk for having these problems.   Why exercise?  Exercising regularly offers many healthy rewards. It can help you do all of the following:    Improve your blood cholesterol level to help prevent further heart trouble    Lower your blood pressure to help prevent a stroke or heart attack    Control diabetes, or reduce your risk of getting this disease    Improve your heart and lung function    Reach and stay at a healthy weight    Make your muscles stronger so you can stay active    Prevent falls and fractures by slowing the loss of bone mass (osteoporosis)    Manage stress better    Reduce your blood pressure    Improve your sense of self and your body image  Exercise tips      Ease into your routine. Set small goals. Then build on them. If you are not sure what your activity level should be, talk with your healthcare provider first before starting an exercise routine.    Exercise on most days. Aim for a total of 150 minutes (2 hours and 30 minutes) or more of moderate-intensity aerobic activity each week. Or 75 minutes (1 hour and 15 minutes) or more of vigorous-intensity aerobic activity each week. Or try for a combination of both. Moderate activity means that you breathe heavier and your heart rate increases but you can still talk. Think about doing 40 minutes of moderate exercise, 3 to 4 times a week. For best results, activity should last for about 40 minutes to lower blood pressure and cholesterol. It's OK to work up to the 40-minute period over time. Examples of moderate-intensity activity are walking 1 mile in 15 minutes. Or doing 30 to 45 minutes of yard work.    Step up your daily activity level.  Along with your exercise program, try being more  active the whole day. Walk instead of drive. Or park further away so that you take more steps each day. Do more household tasks or yard work. You may not be able to meet the advised mount of physical activity. But doing some moderate- or vigorous-intensity aerobic activity can help reduce your risk for heart disease. Your healthcare provider can help you figure out what is best for you.    Choose 1 or more activities you enjoy.  Walking is one of the easiest things you can do. You can also try swimming, riding a bike, dancing, or taking an exercise class.    When to call your healthcare provider  Call your healthcare provider if you have any of these:     Chest pain or feel dizzy or lightheaded    Burning, tightness, pressure, or heaviness in your chest, neck, shoulders, back, or arms    Abnormal shortness of breath    More joint or muscle pain    A very fast or irregular heartbeat (palpitations)  [employer] last reviewed this educational content on 7/1/2019 2000-2020 [employer]. [address] [place] [address]. All rights reserved. This information is not intended as a substitute for professional medical care. Always follow your healthcare professional's instructions.          Understanding USDA MyPlate  The USDA (U.S. Department of Agriculture) has guidelines to help you make healthy food choices. These are called MyPlate. MyPlate shows the food groups that make up healthy meals using the image of a place setting. Before you eat, think about the healthiest choices for what to put onto your plate or into your cup or bowl. To learn more about building a healthy plate, visit www.choosemyplate.gov.    The food groups    Fruits. Any fruit or 100% fruit juice counts as part of the Fruit Group. Fruits may be fresh, canned, frozen, or dried, and may be whole, cut-up, or pureed. Make half your plate fruits and vegetables.    Vegetables. Any vegetable or 100% vegetable juice counts as a member of  the Vegetable Group. Vegetables may be fresh, frozen, canned, or dried. They can be served raw or cooked and may be whole, cut-up, or mashed. Make half your plate fruits and vegetables.    Grains. All foods made from grains are part of the Grains Group. These include wheat, rice, oats, cornmeal, and barley such as bread, pasta, oatmeal, cereal, tortillas, and grits. Grains should be no more than a quarter of your plate. At least half of your grains should be whole grains.    Protein. This group includes meat, poultry, seafood, beans and peas, eggs, processed soy products (like tofu), nuts (including nut butters), and seeds. Make protein choices no more than a quarter of your plate. Meat and poultry choices should be lean or low fat.    Dairy. All fluid milk products and foods made from milk that contain calcium, like yogurt and cheese, are part of the Dairy Group. (Foods that have little calcium, such as cream, butter, and cream cheese, are not part of the group.) Most dairy choices should be low-fat or fat-free.    Oils. These are fats that are liquid at room temperature. They include canola, corn, olive, soybean, and sunflower oil. Foods that are mainly oil include mayonnaise, certain salad dressings, and soft margarines. You should have only 5 to 7 teaspoons of oils a day. You probably already get this much from the food you eat.  [employer] last reviewed this educational content on 8/1/2017 2000-2020 [employer]. [address] [place] [address], [place] [address]. All rights reserved. This information is not intended as a substitute for professional medical care. Always follow your healthcare professional's instructions.

## 2020-11-13 NOTE — PROGRESS NOTES
"SUBJECTIVE:   Cornel Yuen is a 70 year old male who presents for Preventive Visit.      Patient has been advised of split billing requirements and indicates understanding: Yes   Are you in the first 12 months of your Medicare coverage?  No    Healthy Habits:     In general, how would you rate your overall health?  Very good    Frequency of exercise:  None    Duration of exercise:  Less than 15 minutes (feels like he is pretty active)    Do you usually eat at least 4 servings of fruit and vegetables a day, include whole grains    & fiber and avoid regularly eating high fat or \"junk\" foods?  No    Taking medications regularly:  No    Barriers to taking medications:  None    Medication side effects:  None    Ability to successfully perform activities of daily living:  No assistance needed    Home Safety:  No safety concerns identified    Hearing Impairment:  No hearing concerns    In the past 6 months, have you been bothered by leaking of urine?  No    In general, how would you rate your overall mental or emotional health?  Excellent      PHQ-2 Total Score: 0    Additional concerns today:  No    Do you feel safe in your environment? Yes    Have you ever done Advance Care Planning? (For example, a Health Directive, POLST, or a discussion with a medical provider or your loved ones about your wishes): Yes, patient states has an Advance Care Planning document and will bring a copy to the clinic.      Fall risk  Fallen 2 or more times in the past year?: No  Any fall with injury in the past year?: No    Cognitive Screening   1) Repeat 3 items (Leader, Season, Table)    2) Clock draw: NORMAL  3) 3 item recall: Recalls 3 objects  Results: 3 items recalled: COGNITIVE IMPAIRMENT LESS LIKELY    Mini-CogTM Copyright MISAEL Oropeza. Licensed by the author for use in [facility]; reprinted with permission (jordan@.[facility]). All rights reserved.      Do you have sleep apnea, excessive snoring or daytime drowsiness?: " no    Reviewed and updated as needed this visit by clinical staff  Tobacco  Allergies  Meds  Problems  Med Hx  Surg Hx  Fam Hx          Reviewed and updated as needed this visit by Provider  Tobacco  Allergies  Meds  Problems  Med Hx  Surg Hx  Fam Hx         Social History     Tobacco Use     Smoking status: Former Smoker     Smokeless tobacco: Never Used   Substance Use Topics     Alcohol use: Yes     Comment: 2-3 scotch per night     If you drink alcohol do you typically have >3 drinks per day or >7 drinks per week? No      AUDIT - Alcohol Use Disorders Identification Test - Reproduced from the World Health Organization Audit 2001 (Second Edition) 6/8/2018   1.  How often do you have a drink containing alcohol? 4 or more times a week   2.  How many drinks containing alcohol do you have on a typical day when you are drinking? 3 or 4   3.  How often do you have five or more drinks on one occasion? Less than monthly   4.  How often during the last year have you found that you were not able to stop drinking once you had started? Never   5.  How often during the last year have you failed to do what was normally expected of you because of drinking? Never   6.  How often during the last year have you needed a first drink in the morning to get yourself going after a heavy drinking session? Never   7.  How often during the last year have you had a feeling of guilt or remorse after drinking? Never   8.  How often during the last year have you been unable to remember what happened the night before because of your drinking? Never   9.  Have you or someone else been injured because of your drinking? No   10. Has a relative, friend, doctor or other health care worker been concerned about your drinking or suggested you cut down? Yes, during the last year   TOTAL SCORE 10           Hypertension Follow-up      Do you check your blood pressure regularly outside of the clinic? Yes     Are you following a low salt diet?  "Yes    Are your blood pressures ever more than 140 on the top number (systolic) OR more   than 90 on the bottom number (diastolic), for example 140/90? At doctor offices. Patient reports numbers running 130/70's at home      Current providers sharing in care for this patient include:   Patient Care Team:  Noah Brumfield MD as PCP - General  Noah Brumfield MD as Assigned PCP  Edin De La Cruz MD as Assigned Surgical Provider  Elmo Castro DO as Assigned Musculoskeletal Provider    The following health maintenance items are reviewed in Epic and correct as of today:  Health Maintenance   Topic Date Due     HEPATITIS C SCREENING  07[birth year]17/1970     ZOSTER IMMUNIZATION (2 of 3) 07/04/2017     AORTIC ANEURYSM SCREENING (SYSTEM ASSIGNED)  07/17/2017     COLORECTAL CANCER SCREENING  02/19/2020     FALL RISK ASSESSMENT  10/09/2020     MEDICARE ANNUAL WELLNESS VISIT  11/13/2021     DTAP/TDAP/TD IMMUNIZATION (3 - Td) 05/30/2022     LIPID  11/12/2025     ADVANCE CARE PLANNING  11/13/2025     PHQ-2  Completed     INFLUENZA VACCINE  Completed     Pneumococcal Vaccine: 65+ Years  Completed     Pneumococcal Vaccine: Pediatrics (0 to 5 Years) and At-Risk Patients (6 to 64 Years)  Aged Out     IPV IMMUNIZATION  Aged Out     MENINGITIS IMMUNIZATION  Aged Out     HEPATITIS B IMMUNIZATION  Aged Out            Review of Systems  Review Of Systems  Skin: negative  Eyes: negative  Ears/Nose/Throat: negative  Respiratory: No shortness of breath, dyspnea on exertion, cough, or hemoptysis  Cardiovascular: negative  Gastrointestinal: negative  Genitourinary: has ED will call if he needs medication sent  Musculoskeletal: knee pain and seeing ortho  Neurologic: negative  Psychiatric: negative  Hematologic/Lymphatic/Immunologic: negative  Endocrine: negative      OBJECTIVE:   BP (!) 142/72   Pulse 74   Temp 98  F (36.7  C) (Tympanic)   Resp 18   Ht 1.765 m (5' 9.5\")   Wt 70.3 kg (155 lb)   SpO2 99%   BMI 22.56 " "kg/m   Estimated body mass index is 22.56 kg/m  as calculated from the following:    Height as of this encounter: 1.765 m (5' 9.5\").    Weight as of this encounter: 70.3 kg (155 lb).  Physical Exam  GENERAL: healthy, alert and no distress  EYES: Eyes grossly normal to inspection, PERRL and conjunctivae and sclerae normal  HENT: ear canals and TM's normal, nose and mouth without ulcers or lesions  NECK: no adenopathy, no asymmetry, masses, or scars and thyroid normal to palpation  RESP: lungs clear to auscultation - no rales, rhonchi or wheezes  CV: regular rate and rhythm, normal S1 S2, no S3 or S4, no murmur, click or rub, no peripheral edema and peripheral pulses strong  ABDOMEN: soft, nontender, no hepatosplenomegaly, no masses and bowel sounds normal   (male): normal male genitalia without lesions or urethral discharge, no hernia  MS: no gross musculoskeletal defects noted, no edema  SKIN: no suspicious lesions or rashes  NEURO: Normal strength and tone, mentation intact and speech normal  PSYCH: mentation appears normal, affect normal/bright  LYMPH: anterior cervical: no adenopathy  posterior cervical: no adenopathy        ASSESSMENT / PLAN:   (Z00.00) Encounter for Medicare annual wellness exam  (primary encounter diagnosis)  Comment: Discussed healthy lifestyle and preventative cares.    Plan:     (I10) BENIGN HYPERTENSION  Comment: not ideally controlled, will increase med to full tablet and recheck with a telephone visit.  He has bp monitor at home  Plan: atenolol (TENORMIN) 25 MG tablet, losartan         (COZAAR) 25 MG tablet, OFFICE/OUTPT         VISIT,EST,LEVL III            (E78.5) Hyperlipidemia LDL goal <100  Comment: not controlled, ldl went up, reviewed risk factor, recommend to start statin, starting low dose and recheck.  If he has side effects please call me  Plan: atorvastatin (LIPITOR) 10 MG tablet, Lipid         panel reflex to direct LDL Fasting, ALT,         OFFICE/OUTPT VISIT,EST,LEVL " "III            (N52.9) Erectile dysfunction, unspecified erectile dysfunction type  Comment: if he wants med refilled, he will call us  Plan:     (Z12.11) Special screening for malignant neoplasms, colon  Comment:   Plan: Fecal colorectal cancer screen (FIT)              Patient has been advised of split billing requirements and indicates understanding: Yes  COUNSELING:  Reviewed preventive health counseling, as reflected in patient instructions       Regular exercise       Healthy diet/nutrition       Colon cancer screening       Prostate cancer screening    Estimated body mass index is 22.56 kg/m  as calculated from the following:    Height as of this encounter: 1.765 m (5' 9.5\").    Weight as of this encounter: 70.3 kg (155 lb).         He reports that he has quit smoking. He has never used smokeless tobacco.      Appropriate preventive services were discussed with this patient, including applicable screening as appropriate for cardiovascular disease, diabetes, osteopenia/osteoporosis, and glaucoma.  As appropriate for age/gender, discussed screening for colorectal cancer, prostate cancer, breast cancer, and cervical cancer. Checklist reviewing preventive services available has been given to the patient.    Reviewed patients plan of care and provided an AVS. The Basic Care Plan (routine screening as documented in Health Maintenance) for Cornel meets the Care Plan requirement. This Care Plan has been established and reviewed with the Patient.    Counseling Resources:  ATP IV Guidelines  Pooled Cohorts Equation Calculator  Breast Cancer Risk Calculator  Breast Cancer: Medication to Reduce Risk  FRAX Risk Assessment  ICSI Preventive Guidelines  Dietary Guidelines for Americans, 2010  [employer] MyPlate  ASA Prophylaxis  Lung CA Screening    Noah Brumfield MD  [facility]    Identified Health Risks:  "

## 2020-12-08 NOTE — PROGRESS NOTES
"Cornel Yuen is a 70 year old male who is being evaluated via a billable telephone visit.      The patient has been notified of following:     \"This telephone visit will be conducted via a call between you and your physician/provider. We have found that certain health care needs can be provided without the need for a physical exam.  This service lets us provide the care you need with a short phone conversation.  If a prescription is necessary we can send it directly to your pharmacy.  If lab work is needed we can place an order for that and you can then stop by our lab to have the test done at a later time.    Telephone visits are billed at different rates depending on your insurance coverage. During this emergency period, for some insurers they may be billed the same as an in-person visit.  Please reach out to your insurance provider with any questions.    If during the course of the call the physician/provider feels a telephone visit is not appropriate, you will not be charged for this service.\"    Patient has given verbal consent for Telephone visit?  Yes    What phone number would you like to be contacted at? [phone]    How would you like to obtain your AVS? Pt. Doesn't need one    Subjective     Cornel Yuen is a 70 year old male who presents via phone visit today for the following health issues:    HPI     Hypertension Follow-up     Losartan was increased to 25 mg a day after 11/13/20 evaluation.     Do you check your blood pressure regularly outside of the clinic? Yes     Are you following a low salt diet? Yes    Are your blood pressures ever more than 140 on the top number (systolic) OR more   than 90 on the bottom number (diastolic), for example 140/90? Yes  Occasionally, pt. States he just sits for a little while and then it will go down.     How many servings of fruits and vegetables do you eat daily?  2-3    On average, how many sweetened beverages do you drink each day (Examples: soda, " juice, sweet tea, etc.  Do NOT count diet or artificially sweetened beverages)?   1 orange juice on the weekend    How many days per week do you exercise enough to make your heart beat faster? Pt. Is very active    How many minutes a day do you exercise enough to make your heart beat faster? Pt. Is very active    How many days per week do you miss taking your medication? 0             Review of Systems   Review Of Systems  Skin:   Eyes:   Ears/Nose/Throat:   Respiratory: No shortness of breath, dyspnea on exertion, cough, or hemoptysis  Cardiovascular: negative  Gastrointestinal:   Genitourinary:   Musculoskeletal:   Neurologic:   Psychiatric:   Hematologic/Lymphatic/Immunologic:   Endocrine:          Objective   Vitals - Patient Reported  Systolic (Patient Reported): 129  Diastolic (Patient Reported): 77      Vitals:  No vitals were obtained today due to virtual visit.    healthy, alert and no distress  PSYCH: Alert and oriented times 3; coherent speech, normal   rate and volume, able to articulate logical thoughts, able   to abstract reason, no tangential thoughts, no hallucinations   or delusions  His affect is normal  RESP: No cough, no audible wheezing, able to talk in full sentences  Remainder of exam unable to be completed due to telephone visits            Assessment/Plan:    Assessment & Plan     Essential hypertension, benign  Per patient and his readings they are controlled.  No side effects. Med was already refilled for the year.          See Patient Instructions    Return in about 2 months (around 2/15/2021) for Lab Work.    Noah Brumfield MD  [facility]    Phone call duration:  5 minutes

## 2020-12-15 ENCOUNTER — VIRTUAL VISIT (OUTPATIENT)
Dept: FAMILY MEDICINE | Facility: CLINIC | Age: 70
End: 2020-12-15
Payer: COMMERCIAL

## 2020-12-15 DIAGNOSIS — I10 ESSENTIAL HYPERTENSION, BENIGN: Primary | ICD-10-CM

## 2020-12-15 PROCEDURE — 99213 OFFICE O/P EST LOW 20 MIN: CPT | Mod: 95 | Performed by: FAMILY MEDICINE

## 2020-12-15 NOTE — PATIENT INSTRUCTIONS
Please continue with the losartan medication at 25 mg since you are not having any side effects.    Your blood pressure is controlled with your reading today of 129/77.    Please get your fasting labs done in February 2021 as planned.    Sincerely,  Noah Brumfield MD        Thank you for choosing [facility].  You may be receiving an email and/or telephone survey request from [facility] Customer Experience regarding your visit today.  Please take a few minutes to respond to the survey to let us know how we are doing.      If you have questions or concerns, please contact us via [employer] or you can contact your care team at [phone].    Our Clinic hours are:  Monday 6:40 am  to 7:00 pm  Tuesday -Friday 6:40 am to 5:00 pm    The [place] outpatient lab hours are:  Monday - Friday 6:10 am to 4:45 pm  Saturdays 7:00 am to 11:00 am  Appointments are required, call [phone]    If you have clinical questions after hours or would like to schedule an appointment,  call the clinic at [phone].

## 2020-12-21 NOTE — LETTER
2/19/2018         RE: Cornel Yuen  [address]  [place] [address]        Dear Colleague,    Thank you for referring your patient, Cornel Yuen, to the [facility]. Please see a copy of my visit note below.    Cornel Yuen is a 67 year old year old male patient here today for f/u hxof actinic keratosis and non-melanoma skin cancer  He noes some rough spot son hands today.  .  Patient states this has been present for months.  Patient reports the following symptoms:  none .  Patient reports the following previous treatments none.  Patient reports the following modifying factors none.  Associated symptoms: none.  Patient has no other skin complaints today.  Remainder of the HPI, Meds, PMH, Allergies, FH, and SH was reviewed in chart.    Pertinent Hx:   Non-melanoma skin cancer and actinic keratosis   Past Medical History:   Diagnosis Date     Actinic keratosis      Basal cell carcinoma      Essential hypertension, benign      Squamous cell carcinoma        Past Surgical History:   Procedure Laterality Date     C ANESTH,KNEE JOINT; NOS       HC REPAIR OF NASAL SEPTUM       HERNIA REPAIR, INGUINAL RT/LT       TONSILLECTOMY & ADENOIDECTOMY          Family History   Problem Relation Age of Onset     CANCER Father      brain tumor     DIABETES Maternal Grandmother      Alzheimer Disease Maternal Grandmother      C.A.D. Maternal Grandfather      Alzheimer Disease Maternal Grandfather      Unknown/Adopted Paternal Grandmother      C.A.D. Paternal Grandfather      Neurologic Disorder Mother      memory issues     Alzheimer Disease Mother      Asthma Daughter        Social History     Social History     Marital status:      Spouse name: N/A     Number of children: N/A     Years of education: N/A     Occupational History     Not on file.     Social History Main Topics     Smoking status: Former Smoker     Smokeless tobacco: Never Used     Alcohol use Yes      Comment: 2-3 scotch per  Joann Oro is a 47year old male who presents for a complete physical exam.   HPI:      Patient presents with: Well Adult    Patient is present for complete physical. Feels very well. Up to date with dental visits. No hearing problems.  Vaccinations: night     Drug use: Yes      Comment: occasional- marijuana     Sexual activity: Yes     Partners: Female     Birth control/ protection: Surgical      Comment: vasectomy     Other Topics Concern     Parent/Sibling W/ Cabg, Mi Or Angioplasty Before 65f 55m? No     Social History Narrative       Outpatient Encounter Prescriptions as of 2/19/2018   Medication Sig Dispense Refill     atenolol (TENORMIN) 25 MG tablet Take 1 tablet (25 mg) by mouth daily 90 tablet 3     valsartan (DIOVAN) 160 MG tablet Take 2 tablets (320 mg) by mouth daily He did not tolerate the other ARBs, they were tried before and he ended up on valsartan. 180 tablet 3     sildenafil (VIAGRA) 100 MG cap/tab Take 0.5-1 tablets ( mg) by mouth daily as needed for erectile dysfunction Hold on file until needed 6 tablet 11     ASPIRIN 81 MG OR TABS 1 TABLET DAILY       ORDER FOR DME one blood pressure machine one 0     GLUCOSAMINE-CHONDROITIN 750-600 MG OR TABS 1 tab daily 0 0     No facility-administered encounter medications on file as of 2/19/2018.              Review Of Systems  Skin: As above  Eyes: negative  Ears/Nose/Throat: negative  Respiratory: No shortness of breath, dyspnea on exertion, cough, or hemoptysis  Cardiovascular: negative  Gastrointestinal: negative  Genitourinary: negative  Musculoskeletal: negative  Neurologic: negative  Psychiatric: negative  Hematologic/Lymphatic/Immunologic: negative  Endocrine: negative      O:   NAD, WDWN, Alert & Oriented, Mood & Affect wnl, Vitals stable   Here today alone   There were no vitals taken for this visit.   General appearance normal   Vitals stable   Alert, oriented and in no acute distress      Following lymph nodes palpated: Occipital, Cervical, Supraclavicular no lad   Stuck on papules and brown macules on trunk and ext    Red papules on trunk   r hand two gritty papules, L hand 3 gritty papules l forehead 3 gritty papules     The remainder of expanded problem focused exam was  tablet 1   • Omeprazole (PRILOSEC OR) Take by mouth. Past Medical History:   Diagnosis Date   • Esophageal reflux    • Finger fracture     left index finger 10/2020   • Hyperlipidemia       History reviewed. No pertinent surgical history.    Family H EOMI.  OP moist no lesions. TM normal, canals normal.  NECK: is supple,thyroid- normal. No carotid bruits. Heart: is RRR. S1, S2, with no murmurs. Lungs: are clear to auscultation bilaterally, with no wheeze, rhonchi, or rales. Heart: is RRR.   S1, unremarkable; the following areas were examined:  scalp/hair, conjunctiva/lids, face, neck, lips, back, anb, chest, digits/nails, RUE, LUE.      Eyes: Conjunctivae/lids:Normal     ENT: Lips, buccal mucosa, tongue: normal    MSK:Normal    Cardiovascular: peripheral edema none    Pulm: Breathing Normal    Lymph Nodes: No Head and Neck Lymphadenopathy     Neuro/Psych: Orientation:Normal; Mood/Affect:Normal      A/P:  1. Seborrheic keratosis, letnigo, angioma  2. Hx of non-melanoma skin cancer   3. Actinic keratosis   L handx3, R handx2, L forehead x3  LN2:  Treated with LN2 for 5s for 1-2 cycles. Warned risks of blistering, pain, pigment change, scarring, and incomplete resolution.  Advised patient to return if lesions do not completely resolve.  Wound care sheet given.    BENIGN LESIONS DISCUSSED WITH PATIENT:  I discussed the specifics of tumor, prognosis, and genetics of benign lesions.  I explained that treatment of these lesions would be purely cosmetic and not medically neccessary.  I discussed with patient different removal options including excision, cautery and /or laser.      Nature and genetics of benign skin lesions dicussed with patient.  Signs and Symptoms of skin cancer discussed with patient.  Patient encouraged to perform monthly skin exams.  UV precautions reviewed with patient.  Skin care regimen reviewed with patient: Eliminate harsh soaps, i.e. Dial, zest, irsih spring; Mild soaps such as Cetaphil or Dove sensitive skin, avoid hot or cold showers, aggressive use of emollients including vanicream, cetaphil or cerave discussed with patient.    Risks of non-melanoma skin cancer discussed with patient   Return to clinic 6 months      Again, thank you for allowing me to participate in the care of your patient.        Sincerely,        Edin De La Cruz MD

## 2021-01-12 DIAGNOSIS — Z12.11 SPECIAL SCREENING FOR MALIGNANT NEOPLASMS, COLON: ICD-10-CM

## 2021-01-12 PROCEDURE — 82274 ASSAY TEST FOR BLOOD FECAL: CPT | Performed by: FAMILY MEDICINE

## 2021-01-15 LAB — HEMOCCULT STL QL IA: NEGATIVE

## 2021-02-11 DIAGNOSIS — E78.5 HYPERLIPIDEMIA LDL GOAL <100: ICD-10-CM

## 2021-02-11 LAB
ALT SERPL W P-5'-P-CCNC: 44 U/L (ref 0–70)
CHOLEST SERPL-MCNC: 196 MG/DL
HDLC SERPL-MCNC: 73 MG/DL
LDLC SERPL CALC-MCNC: 97 MG/DL
NONHDLC SERPL-MCNC: 123 MG/DL
TRIGL SERPL-MCNC: 132 MG/DL

## 2021-02-11 PROCEDURE — 80061 LIPID PANEL: CPT | Performed by: FAMILY MEDICINE

## 2021-02-11 PROCEDURE — 36415 COLL VENOUS BLD VENIPUNCTURE: CPT | Performed by: FAMILY MEDICINE

## 2021-02-11 PROCEDURE — 84460 ALANINE AMINO (ALT) (SGPT): CPT | Performed by: FAMILY MEDICINE

## 2021-05-24 NOTE — ADDENDUM NOTE
Addended by: BRADLEY VEGA on: 5/9/2017 08:51 AM     Modules accepted: Orders     msg sent to patient

## 2021-09-18 ENCOUNTER — HEALTH MAINTENANCE LETTER (OUTPATIENT)
Age: 71
End: 2021-09-18

## 2021-10-30 DIAGNOSIS — E78.5 HYPERLIPIDEMIA LDL GOAL <100: ICD-10-CM

## 2021-11-01 RX ORDER — ATORVASTATIN CALCIUM 10 MG/1
10 TABLET, FILM COATED ORAL DAILY
Qty: 90 TABLET | Refills: 0 | Status: SHIPPED | OUTPATIENT
Start: 2021-11-01 | End: 2021-12-17

## 2021-12-14 ENCOUNTER — OFFICE VISIT (OUTPATIENT)
Dept: DERMATOLOGY | Facility: CLINIC | Age: 71
End: 2021-12-14
Payer: COMMERCIAL

## 2021-12-14 VITALS
DIASTOLIC BLOOD PRESSURE: 83 MMHG | BODY MASS INDEX: 22.89 KG/M2 | HEIGHT: 69 IN | SYSTOLIC BLOOD PRESSURE: 152 MMHG | HEART RATE: 75 BPM

## 2021-12-14 DIAGNOSIS — L57.0 AK (ACTINIC KERATOSIS): ICD-10-CM

## 2021-12-14 DIAGNOSIS — D23.9 DERMAL NEVUS: ICD-10-CM

## 2021-12-14 DIAGNOSIS — D18.01 ANGIOMA OF SKIN: ICD-10-CM

## 2021-12-14 DIAGNOSIS — Z85.828 HISTORY OF SKIN CANCER: ICD-10-CM

## 2021-12-14 DIAGNOSIS — L81.4 LENTIGO: Primary | ICD-10-CM

## 2021-12-14 DIAGNOSIS — L82.1 SEBORRHEIC KERATOSIS: ICD-10-CM

## 2021-12-14 PROCEDURE — 99213 OFFICE O/P EST LOW 20 MIN: CPT | Performed by: DERMATOLOGY

## 2021-12-14 NOTE — PROGRESS NOTES
Cornel Yuen is an extremely pleasant 71 year old year old male patient here today for hx of non-melanoma skin cancer.  HE denies any new or changing skin lesions.  Patient has no other skin complaints today.  Remainder of the HPI, Meds, PMH, Allergies, FH, and SH was reviewed in chart.      Past Medical History:   Diagnosis Date     Actinic keratosis      Basal cell carcinoma      Essential hypertension, benign      Squamous cell carcinoma        Past Surgical History:   Procedure Laterality Date     C ANESTH,KNEE JOINT; NOS       HC REPAIR OF NASAL SEPTUM       HERNIA REPAIR, INGUINAL RT/LT       TONSILLECTOMY & ADENOIDECTOMY          Family History   Problem Relation Age of Onset     Cancer Father         brain tumor     Diabetes Maternal Grandmother      Alzheimer Disease Maternal Grandmother      C.A.D. Maternal Grandfather      Alzheimer Disease Maternal Grandfather      Unknown/Adopted Paternal Grandmother      C.A.D. Paternal Grandfather      Neurologic Disorder Mother         memory issues     Alzheimer Disease Mother      Asthma Daughter      Melanoma No family hx of        Social History     Socioeconomic History     Marital status:      Spouse name: Not on file     Number of children: Not on file     Years of education: Not on file     Highest education level: Not on file   Occupational History     Not on file   Tobacco Use     Smoking status: Former Smoker     Smokeless tobacco: Never Used   Substance and Sexual Activity     Alcohol use: Yes     Comment: 2-3 scotch per night     Drug use: Yes     Comment: occasional- marijuana     Sexual activity: Yes     Partners: Female     Birth control/protection: Surgical     Comment: vasectomy   Other Topics Concern     Parent/sibling w/ CABG, MI or angioplasty before 65F 55M? No   Social History Narrative     Not on file     Social Determinants of Health     Financial Resource Strain: Not on file   Food Insecurity: Not on file   Transportation Needs:  "Not on file   Physical Activity: Not on file   Stress: Not on file   Social Connections: Not on file   Intimate Partner Violence: Not on file   Housing Stability: Not on file       Outpatient Encounter Medications as of 12/14/2021   Medication Sig Dispense Refill     ASPIRIN 81 MG OR TABS 1 TABLET DAILY       atenolol (TENORMIN) 25 MG tablet Take 1 tablet (25 mg) by mouth daily 90 tablet 3     atorvastatin (LIPITOR) 10 MG tablet Take 1 tablet (10 mg) by mouth daily 90 tablet 0     Avanafil 200 MG TABS Take 100-200 mg by mouth See Admin Instructions Take 30 minutes prior to sexual intercourse.  (see if this is cheaper) 6 tablet 11     GLUCOSAMINE-CHONDROITIN 750-600 MG OR TABS 1 tab daily 0 0     losartan (COZAAR) 25 MG tablet Take 1 tablet (25 mg) by mouth daily Hold on file until needed. 90 tablet 3     No facility-administered encounter medications on file as of 12/14/2021.             O:   NAD, WDWN, Alert & Oriented, Mood & Affect wnl, Vitals stable   Here today alone   BP (!) 152/83   Pulse 75   Ht 1.753 m (5' 9\")   BMI 22.89 kg/m     General appearance normal   Vitals stable   Alert, oriented and in no acute distress     Gritty papules on hands    Stuck on papules and brown macules on trunk and ext   Red papules on trunk  Flesh colored papules on trunk     The remainder of the full exam was normal; the following areas were examined:  conjunctiva/lids, oral mucosa, neck, peripheral vascular system, abdomen, lymph nodes, digits/nails, eccrine and apocrine glands, scalp/hair, face, neck, chest, abdomen, buttocks, back, RUE, LUE, RLE, LLE       Eyes: Conjunctivae/lids:Normal     ENT: Lips, buccal mucosa, tongue: normal    MSK:Normal    Cardiovascular: peripheral edema none    Pulm: Breathing Normal    Lymph Nodes: No Head and Neck Lymphadenopathy     Neuro/Psych: Orientation:Alert and Orientedx3 ; Mood/Affect:normal       A/P:  1. Seborrheic keratosis, lentigo, angioma, dermal nevus, hx of non-melanoma skin " cancer   2. Actinic keratosis   He declines tx for now  It was a pleasure speaking to Cornel Yuen today.  Previous clinic notes and pertinent laboratory tests were reviewed prior to Cornel Yuen's visit.  Nature and genetics of benign skin lesions dicussed with patient.  Signs and Symptoms of skin cancer discussed with patient.  Patient encouraged to perform monthly skin exams.  UV precautions reviewed with patient.  Risks of non-melanoma skin cancer discussed with patient   Return to clinic 12 months

## 2021-12-14 NOTE — LETTER
12/14/2021         RE: Cornel Yuen  [address]  [facility] [address]        Dear Colleague,    Thank you for referring your patient, Cornel Yuen, to the [facility]. Please see a copy of my visit note below.    Cornel Yuen is an extremely pleasant 71 year old year old male patient here today for hx of non-melanoma skin cancer.  HE denies any new or changing skin lesions.  Patient has no other skin complaints today.  Remainder of the HPI, Meds, PMH, Allergies, FH, and SH was reviewed in chart.      Past Medical History:   Diagnosis Date     Actinic keratosis      Basal cell carcinoma      Essential hypertension, benign      Squamous cell carcinoma        Past Surgical History:   Procedure Laterality Date     C ANESTH,KNEE JOINT; NOS       HC REPAIR OF NASAL SEPTUM       HERNIA REPAIR, INGUINAL RT/LT       TONSILLECTOMY & ADENOIDECTOMY          Family History   Problem Relation Age of Onset     Cancer Father         brain tumor     Diabetes Maternal Grandmother      Alzheimer Disease Maternal Grandmother      C.A.D. Maternal Grandfather      Alzheimer Disease Maternal Grandfather      Unknown/Adopted Paternal Grandmother      C.A.D. Paternal Grandfather      Neurologic Disorder Mother         memory issues     Alzheimer Disease Mother      Asthma Daughter      Melanoma No family hx of        Social History     Socioeconomic History     Marital status:      Spouse name: Not on file     Number of children: Not on file     Years of education: Not on file     Highest education level: Not on file   Occupational History     Not on file   Tobacco Use     Smoking status: Former Smoker     Smokeless tobacco: Never Used   Substance and Sexual Activity     Alcohol use: Yes     Comment: 2-3 scotch per night     Drug use: Yes     Comment: occasional- marijuana     Sexual activity: Yes     Partners: Female     Birth control/protection: Surgical     Comment: vasectomy   Other Topics  "Concern     Parent/sibling w/ CABG, MI or angioplasty before 65F 55M? No   Social History Narrative     Not on file     Social Determinants of Health     Financial Resource Strain: Not on file   Food Insecurity: Not on file   Transportation Needs: Not on file   Physical Activity: Not on file   Stress: Not on file   Social Connections: Not on file   Intimate Partner Violence: Not on file   Housing Stability: Not on file       Outpatient Encounter Medications as of 12/14/2021   Medication Sig Dispense Refill     ASPIRIN 81 MG OR TABS 1 TABLET DAILY       atenolol (TENORMIN) 25 MG tablet Take 1 tablet (25 mg) by mouth daily 90 tablet 3     atorvastatin (LIPITOR) 10 MG tablet Take 1 tablet (10 mg) by mouth daily 90 tablet 0     Avanafil 200 MG TABS Take 100-200 mg by mouth See Admin Instructions Take 30 minutes prior to sexual intercourse.  (see if this is cheaper) 6 tablet 11     GLUCOSAMINE-CHONDROITIN 750-600 MG OR TABS 1 tab daily 0 0     losartan (COZAAR) 25 MG tablet Take 1 tablet (25 mg) by mouth daily Hold on file until needed. 90 tablet 3     No facility-administered encounter medications on file as of 12/14/2021.             O:   NAD, WDWN, Alert & Oriented, Mood & Affect wnl, Vitals stable   Here today alone   BP (!) 152/83   Pulse 75   Ht 1.753 m (5' 9\")   BMI 22.89 kg/m     General appearance normal   Vitals stable   Alert, oriented and in no acute distress     Gritty papules on hands    Stuck on papules and brown macules on trunk and ext   Red papules on trunk  Flesh colored papules on trunk     The remainder of the full exam was normal; the following areas were examined:  conjunctiva/lids, oral mucosa, neck, peripheral vascular system, abdomen, lymph nodes, digits/nails, eccrine and apocrine glands, scalp/hair, face, neck, chest, abdomen, buttocks, back, RUE, LUE, RLE, LLE       Eyes: Conjunctivae/lids:Normal     ENT: Lips, buccal mucosa, tongue: normal    MSK:Normal    Cardiovascular: peripheral edema " none    Pulm: Breathing Normal    Lymph Nodes: No Head and Neck Lymphadenopathy     Neuro/Psych: Orientation:Alert and Orientedx3 ; Mood/Affect:normal       A/P:  1. Seborrheic keratosis, lentigo, angioma, dermal nevus, hx of non-melanoma skin cancer   2. Actinic keratosis   He declines tx for now  It was a pleasure speaking to Cornel Yeun today.  Previous clinic notes and pertinent laboratory tests were reviewed prior to Cornel Yuen's visit.  Nature and genetics of benign skin lesions dicussed with patient.  Signs and Symptoms of skin cancer discussed with patient.  Patient encouraged to perform monthly skin exams.  UV precautions reviewed with patient.  Risks of non-melanoma skin cancer discussed with patient   Return to clinic 12 months        Again, thank you for allowing me to participate in the care of your patient.        Sincerely,        Edin De La Cruz MD

## 2021-12-17 ENCOUNTER — OFFICE VISIT (OUTPATIENT)
Dept: FAMILY MEDICINE | Facility: CLINIC | Age: 71
End: 2021-12-17
Payer: COMMERCIAL

## 2021-12-17 VITALS
DIASTOLIC BLOOD PRESSURE: 72 MMHG | SYSTOLIC BLOOD PRESSURE: 132 MMHG | BODY MASS INDEX: 22.63 KG/M2 | HEIGHT: 69 IN | TEMPERATURE: 96.8 F | RESPIRATION RATE: 18 BRPM | WEIGHT: 152.8 LBS | HEART RATE: 59 BPM | OXYGEN SATURATION: 96 %

## 2021-12-17 DIAGNOSIS — Z12.5 SCREENING FOR PROSTATE CANCER: ICD-10-CM

## 2021-12-17 DIAGNOSIS — Z00.00 ENCOUNTER FOR MEDICARE ANNUAL WELLNESS EXAM: Primary | ICD-10-CM

## 2021-12-17 DIAGNOSIS — I10 ESSENTIAL HYPERTENSION, BENIGN: ICD-10-CM

## 2021-12-17 DIAGNOSIS — E78.5 HYPERLIPIDEMIA LDL GOAL <100: ICD-10-CM

## 2021-12-17 LAB
ANION GAP SERPL CALCULATED.3IONS-SCNC: 5 MMOL/L (ref 3–14)
BUN SERPL-MCNC: 21 MG/DL (ref 7–30)
CALCIUM SERPL-MCNC: 8.9 MG/DL (ref 8.5–10.1)
CHLORIDE BLD-SCNC: 103 MMOL/L (ref 94–109)
CHOLEST SERPL-MCNC: 186 MG/DL
CO2 SERPL-SCNC: 28 MMOL/L (ref 20–32)
CREAT SERPL-MCNC: 0.82 MG/DL (ref 0.66–1.25)
FASTING STATUS PATIENT QL REPORTED: YES
GFR SERPL CREATININE-BSD FRML MDRD: 90 ML/MIN/1.73M2
GLUCOSE BLD-MCNC: 87 MG/DL (ref 70–99)
HDLC SERPL-MCNC: 67 MG/DL
LDLC SERPL CALC-MCNC: 91 MG/DL
NONHDLC SERPL-MCNC: 119 MG/DL
POTASSIUM BLD-SCNC: 3.6 MMOL/L (ref 3.4–5.3)
PSA SERPL-MCNC: 0.75 UG/L (ref 0–4)
SODIUM SERPL-SCNC: 136 MMOL/L (ref 133–144)
TRIGL SERPL-MCNC: 141 MG/DL

## 2021-12-17 PROCEDURE — G0103 PSA SCREENING: HCPCS | Performed by: FAMILY MEDICINE

## 2021-12-17 PROCEDURE — G0439 PPPS, SUBSEQ VISIT: HCPCS | Performed by: FAMILY MEDICINE

## 2021-12-17 PROCEDURE — 80048 BASIC METABOLIC PNL TOTAL CA: CPT | Performed by: FAMILY MEDICINE

## 2021-12-17 PROCEDURE — 99214 OFFICE O/P EST MOD 30 MIN: CPT | Mod: 25 | Performed by: FAMILY MEDICINE

## 2021-12-17 PROCEDURE — 80061 LIPID PANEL: CPT | Performed by: FAMILY MEDICINE

## 2021-12-17 PROCEDURE — 36415 COLL VENOUS BLD VENIPUNCTURE: CPT | Performed by: FAMILY MEDICINE

## 2021-12-17 RX ORDER — ATORVASTATIN CALCIUM 10 MG/1
10 TABLET, FILM COATED ORAL DAILY
Qty: 90 TABLET | Refills: 3 | Status: SHIPPED | OUTPATIENT
Start: 2021-12-17 | End: 2022-12-30

## 2021-12-17 RX ORDER — ATENOLOL 25 MG/1
25 TABLET ORAL DAILY
Qty: 90 TABLET | Refills: 3 | Status: SHIPPED | OUTPATIENT
Start: 2021-12-17 | End: 2022-12-30

## 2021-12-17 RX ORDER — LOSARTAN POTASSIUM 25 MG/1
25 TABLET ORAL DAILY
Qty: 90 TABLET | Refills: 3 | Status: SHIPPED | OUTPATIENT
Start: 2021-12-17 | End: 2022-12-26

## 2021-12-17 ASSESSMENT — ENCOUNTER SYMPTOMS
PARESTHESIAS: 0
SHORTNESS OF BREATH: 0
ABDOMINAL PAIN: 0
FREQUENCY: 0
CHILLS: 0
COUGH: 0
HEARTBURN: 0
HEMATURIA: 0
CONSTIPATION: 0
DIARRHEA: 0
HEMATOCHEZIA: 0
JOINT SWELLING: 1
DIZZINESS: 0
MYALGIAS: 0
ARTHRALGIAS: 1
PALPITATIONS: 0
EYE PAIN: 0
SORE THROAT: 0
NERVOUS/ANXIOUS: 0
FEVER: 0
NAUSEA: 0
DYSURIA: 0
HEADACHES: 0
WEAKNESS: 0

## 2021-12-17 ASSESSMENT — MIFFLIN-ST. JEOR: SCORE: 1448.48

## 2021-12-17 ASSESSMENT — ACTIVITIES OF DAILY LIVING (ADL): CURRENT_FUNCTION: NO ASSISTANCE NEEDED

## 2021-12-17 NOTE — PATIENT INSTRUCTIONS
Please go to lab.    I have refilled your medications.    Please be aware that there will be an additional charge during your preventative visit due to either a new diagnosis and/or chronic disease management.    Preventative visits screen for diseases prior to they occur.  They do not cover for any new diagnosis or chronic disease management which would include medication refills, labs etc.    If you have questions regarding your coverage please check with your insurance provider.  At [place] we need to code correctly to be in compliance with all insurance companies.          Thank you for choosing [facility].  You may be receiving an email and/or telephone survey request from [facility] Customer Experience regarding your visit today.  Please take a few minutes to respond to the survey to let us know how we are doing.      If you have questions or concerns, please contact us via [employer] or you can contact your care team at [phone] option [phone].    Our Clinic hours are:  Monday - Thursday 7am-6pm  Friday 7am-5pm    The [place] outpatient lab hours are:  Monday - Friday 7am-4:30pm    Appointments are required, call [phone]    If you have clinical questions after hours or would like to schedule an appointment,  call the clinic at [phone].    Patient Education   Personalized Prevention Plan  You are due for the preventive services outlined below.  Your care team is available to assist you in scheduling these services.  If you have already completed any of these items, please share that information with your care team to update in your medical record.  Health Maintenance Due   Topic Date Due     ANNUAL REVIEW OF HM ORDERS  Never done     Hepatitis C Screening  Never done     LUNG CANCER SCREENING  Never done     Zoster (Shingles) Vaccine (2 of 3) 07/04/2017     AORTIC ANEURYSM SCREENING (SYSTEM ASSIGNED)  Never done     FALL RISK ASSESSMENT  11/13/2021     Colorectal Cancer Screening  01/12/2022        Exercise for a Healthier Heart  You may wonder how you can improve the health of your heart. If you re thinking about exercise, you re on the right track. You don t need to become an athlete. But you do need a certain amount of brisk exercise to help strengthen your heart. If you have been diagnosed with a heart condition, your healthcare provider may advise exercise to help stabilize your condition. To help make exercise a habit, choose safe, fun activities.      Exercise with a friend. When activity is fun, you're more likely to stick with it.   Before you start  Check with your healthcare provider before starting an exercise program. This is especially important if you have not been active for a while. It's also important if you have a long-term (chronic) health problem such as heart disease, diabetes, or obesity. Or if you are at high risk for having these problems.   Why exercise?  Exercising regularly offers many healthy rewards. It can help you do all of the following:     Improve your blood cholesterol level to help prevent further heart trouble    Lower your blood pressure to help prevent a stroke or heart attack    Control diabetes, or reduce your risk of getting this disease    Improve your heart and lung function    Reach and stay at a healthy weight    Make your muscles stronger so you can stay active    Prevent falls and fractures by slowing the loss of bone mass (osteoporosis)    Manage stress better    Reduce your blood pressure    Improve your sense of self and your body image  Exercise tips      Ease into your routine. Set small goals. Then build on them. If you are not sure what your activity level should be, talk with your healthcare provider first before starting an exercise routine.    Exercise on most days. Aim for a total of 150 minutes (2 hours and 30 minutes) or more of moderate-intensity aerobic activity each week. Or 75 minutes (1 hour and 15 minutes) or more of vigorous-intensity  aerobic activity each week. Or try for a combination of both. Moderate activity means that you breathe heavier and your heart rate increases but you can still talk. Think about doing 40 minutes of moderate exercise, 3 to 4 times a week. For best results, activity should last for about 40 minutes to lower blood pressure and cholesterol. It's OK to work up to the 40-minute period over time. Examples of moderate-intensity activity are walking 1 mile in 15 minutes. Or doing 30 to 45 minutes of yard work.    Step up your daily activity level.  Along with your exercise program, try being more active the whole day. Walk instead of drive. Or park further away so that you take more steps each day. Do more household tasks or yard work. You may not be able to meet the advised mount of physical activity. But doing some moderate- or vigorous-intensity aerobic activity can help reduce your risk for heart disease. Your healthcare provider can help you figure out what is best for you.    Choose 1 or more activities you enjoy.  Walking is one of the easiest things you can do. You can also try swimming, riding a bike, dancing, or taking an exercise class.    When to call your healthcare provider  Call your healthcare provider if you have any of these:     Chest pain or feel dizzy or lightheaded    Burning, tightness, pressure, or heaviness in your chest, neck, shoulders, back, or arms    Abnormal shortness of breath    More joint or muscle pain    A very fast or irregular heartbeat (palpitations)  [employer] last reviewed this educational content on 7/1/2019 2000-2021 [employer]. All rights reserved. This information is not intended as a substitute for professional medical care. Always follow your healthcare professional's instructions.         Patient Education   Alcohol Use     Many people can enjoy a glass of wine or beer without any negative consequences to their health. According to the Centers for Disease  Control and Prevention (CDC), having one or fewer drinks per day for women and two or fewer per day for men is considered moderate drinking.     When people drink more than moderately, it can become concerning. Excessive drinking is defined as consuming 15 drinks or more per week for men and 8 drinks or more per week for women. There are various health problems associated with excessive drinking, which include:    Damage to vital organs like the heart, brain, liver and pancreas    Harm to the digestive tract    Weaken the immune system    Higher risk for heart disease and cancer    There are many resources available to people who are addicted to alcohol. A counselor or other health care provider can give you support. So can a , , or rabbi who is trained in substance abuse counseling. Friends and family may also help once you are connected with professionals.           Signs of Hearing Loss      Hearing much better with one ear can be a sign of hearing loss.   Hearing loss is a problem shared by many people. In fact, it is one of the most common health problems, particularly as people age. Most people age 65 and older have some hearing loss. By age 80, almost everyone does. Hearing loss often occurs slowly over the years. So you may not realize your hearing has gotten worse.  Have your hearing checked  Call your healthcare provider if you:    Have to strain to hear normal conversation    Have to watch other people s faces very carefully to follow what they re saying    Need to ask people to repeat what they ve said    Often misunderstand what people are saying    Turn the volume of the television or radio up so high that others complain    Feel that people are mumbling when they re talking to you    Find that the effort to hear leaves you feeling tired and irritated    Notice, when using the phone, that you hear better with one ear than the other  [employer] last reviewed this educational content on  1/1/2020 2000-2021 [employer]. All rights reserved. This information is not intended as a substitute for professional medical care. Always follow your healthcare professional's instructions.

## 2021-12-17 NOTE — PROGRESS NOTES
"    He is at risk for lack of exercise and has been provided with information to increase physical activity for the benefit of his well-being.  The patient reports that he drinks more than one alcoholic drink per day and sometimes engages in binge or excessive drinking. He was counseled and given information about possible harmful effects of excessive alcohol intake as well as where to get help for alcohol problems.  The patient was provided with written information regarding signs of hearing loss.  Answers for HPI/ROS submitted by the patient on 12/17/2021  In general, how would you rate your overall physical health?: good  Frequency of exercise:: None  Do you usually eat at least 4 servings of fruit and vegetables a day, include whole grains & fiber, and avoid regularly eating high fat or \"junk\" foods? : Yes  Taking medications regularly:: Yes  Medication side effects:: None  Activities of Daily Living: no assistance needed  Home safety: lack of grab bars in the bathroom  Hearing Impairment:: difficulty following a conversation in a noisy restaurant or crowded room, need to ask people to speak up or repeat themselves, difficulty understanding soft or whispered speech  In the past 6 months, have you been bothered by leaking of urine?: No  abdominal pain: No  Blood in stool: No  Blood in urine: No  chest pain: No  chills: No  congestion: No  constipation: No  cough: No  diarrhea: No  dizziness: No  ear pain: No  eye pain: No  nervous/anxious: No  fever: No  frequency: No  genital sores: No  headaches: No  hearing loss: Yes  heartburn: No  arthralgias: Yes  joint swelling: Yes  peripheral edema: No  mood changes: No  myalgias: No  nausea: No  dysuria: No  palpitations: No  Skin sensation changes: No  sore throat: No  urgency: No  rash: No  shortness of breath: No  visual disturbance: No  weakness: No  impotence: Yes  penile discharge: No  In general, how would you rate your overall mental or emotional health?: " excellent  Additional concerns today:: No

## 2021-12-17 NOTE — PROGRESS NOTES
"SUBJECTIVE:   Cornel Yuen is a 71 year old male who presents for Preventive Visit.      Patient has been advised of split billing requirements and indicates understanding: Yes   Are you in the first 12 months of your Medicare coverage?  No    Healthy Habits:     In general, how would you rate your overall health?  Good    Frequency of exercise:  None    Do you usually eat at least 4 servings of fruit and vegetables a day, include whole grains    & fiber and avoid regularly eating high fat or \"junk\" foods?  Yes    Taking medications regularly:  Yes    Medication side effects:  None    Ability to successfully perform activities of daily living:  No assistance needed    Home Safety:  Lack of grab bars in the bathroom    Hearing Impairment:  Difficulty following a conversation in a noisy restaurant or crowded room, need to ask people to speak up or repeat themselves and difficulty understanding soft or whispered speech    In the past 6 months, have you been bothered by leaking of urine?  No    In general, how would you rate your overall mental or emotional health?  Excellent      PHQ-2 Total Score: 0    Additional concerns today:  No    Do you feel safe in your environment? Yes    Have you ever done Advance Care Planning? (For example, a Health Directive, POLST, or a discussion with a medical provider or your loved ones about your wishes): No, advance care planning information given to patient to review.  Patient declined advance care planning discussion at this time.       Fall risk  Fallen 2 or more times in the past year?: No  Any fall with injury in the past year?: No    Cognitive Screening   1) Repeat 3 items (Leader, Season, Table)    2) Clock draw: NORMAL  3) 3 item recall: Recalls 3 objects  Results: 3 items recalled: COGNITIVE IMPAIRMENT LESS LIKELY    Mini-CogTM Copyright S Sandip. Licensed by the author for use in [facility]; reprinted with permission (jordan@.[facility]). All rights reserved.  "     Do you have sleep apnea, excessive snoring or daytime drowsiness?: no    Reviewed and updated as needed this visit by clinical staff  Tobacco  Allergies  Meds   Med Hx  Surg Hx  Fam Hx  Soc Hx       Reviewed and updated as needed this visit by Provider               Social History     Tobacco Use     Smoking status: Former Smoker     Smokeless tobacco: Never Used   Substance Use Topics     Alcohol use: Yes     Comment: 2-3 scotch per night     If you drink alcohol do you typically have >3 drinks per day or >7 drinks per week? Yes      Alcohol Use 12/17/2021   Prescreen: >3 drinks/day or >7 drinks/week? Yes   Prescreen: >3 drinks/day or >7 drinks/week? -   AUDIT SCORE  8     AUDIT - Alcohol Use Disorders Identification Test - Reproduced from the World Health Organization Audit 2001 (Second Edition) 12/17/2021   1.  How often do you have a drink containing alcohol? 4 or more times a week   2.  How many drinks containing alcohol do you have on a typical day when you are drinking? 1 or 2   3.  How often do you have five or more drinks on one occasion? Never   4.  How often during the last year have you found that you were not able to stop drinking once you had started? Never   5.  How often during the last year have you failed to do what was normally expected of you because of drinking? Never   6.  How often during the last year have you needed a first drink in the morning to get yourself going after a heavy drinking session? Never   7.  How often during the last year have you had a feeling of guilt or remorse after drinking? Never   8.  How often during the last year have you been unable to remember what happened the night before because of your drinking? Never   9.  Have you or someone else been injured because of your drinking? No   10. Has a relative, friend, doctor or other health care worker been concerned about your drinking or suggested you cut down? Yes, during the last year   TOTAL SCORE 8  "    Hyperlipidemia Follow-Up      Are you regularly taking any medication or supplement to lower your cholesterol?   Yes- atorvastatin    Are you having muscle aches or other side effects that you think could be caused by your cholesterol lowering medication?  No    Hypertension Follow-up      Do you check your blood pressure regularly outside of the clinic? No     Are you following a low salt diet? Yes    Are your blood pressures ever more than 140 on the top number (systolic) OR more   than 90 on the bottom number (diastolic), for example 140/90? Unknown, not checking       Current providers sharing in care for this patient include:   Patient Care Team:  Noah Brumfield MD as PCP - General  Noah Brumfield MD as Assigned PCP  Edin De La Cruz MD as Assigned Surgical Provider  Elmo Castro DO as Assigned Musculoskeletal Provider    The following health maintenance items are reviewed in Epic and correct as of today:  Health Maintenance Due   Topic Date Due     ANNUAL REVIEW OF  ORDERS  Never done     HEPATITIS C SCREENING  Never done     LUNG CANCER SCREENING  Never done     ZOSTER IMMUNIZATION (2 of 3) 07/04/2017     AORTIC ANEURYSM SCREENING (SYSTEM ASSIGNED)  Never done     FALL RISK ASSESSMENT  11/13/2021     COLORECTAL CANCER SCREENING  01/12/2022               Review of Systems  As above    OBJECTIVE:   /72   Pulse 59   Temp 96.8  F (36  C) (Tympanic)   Resp 18   Ht 1.753 m (5' 9\")   Wt 69.3 kg (152 lb 12.8 oz)   SpO2 96%   BMI 22.56 kg/m   Estimated body mass index is 22.56 kg/m  as calculated from the following:    Height as of this encounter: 1.753 m (5' 9\").    Weight as of this encounter: 69.3 kg (152 lb 12.8 oz).  Physical Exam  GENERAL: healthy, alert and no distress  EYES: Eyes grossly normal to inspection, PERRL and conjunctivae and sclerae normal  HENT: ear canals and TM's normal, nose and mouth without ulcers or lesions  NECK: no adenopathy, no asymmetry, " "masses, or scars and thyroid normal to palpation  RESP: lungs clear to auscultation - no rales, rhonchi or wheezes  CV: regular rate and rhythm, normal S1 S2, no S3 or S4, no murmur, click or rub, no peripheral edema and peripheral pulses strong  ABDOMEN: soft, nontender, no hepatosplenomegaly, no masses and bowel sounds normal   (male): normal male genitalia without lesions or urethral discharge, no hernia  MS: no gross musculoskeletal defects noted, no edema  SKIN: no suspicious lesions or rashes  NEURO: Normal strength and tone, mentation intact and speech normal  PSYCH: mentation appears normal, affect normal/bright  LYMPH: no cervical adenopathy        ASSESSMENT / PLAN:   (Z00.00) Encounter for Medicare annual wellness exam  (primary encounter diagnosis)  Comment: Discussed healthy lifestyle and preventative cares.    Plan:     (I10) BENIGN HYPERTENSION  Comment: controlled, refilled med, check lab  Plan: atenolol (TENORMIN) 25 MG tablet, losartan         (COZAAR) 25 MG tablet, Basic metabolic panel,         OFFICE/OUTPT VISIT,EST,LEVL IV            (E78.5) Hyperlipidemia LDL goal <100  Comment: check, lab refilled med , no side effects  Plan: atorvastatin (LIPITOR) 10 MG tablet, Lipid         panel reflex to direct LDL Fasting,         OFFICE/OUTPT VISIT,EST,LEVL IV            (Z12.5) Screening for prostate cancer  Comment:   Plan: Prostate Specific Antigen Screen              Patient has been advised of split billing requirements and indicates understanding: Yes  COUNSELING:  Reviewed preventive health counseling, as reflected in patient instructions       Regular exercise       Healthy diet/nutrition       Colon cancer screening       Prostate cancer screening    Estimated body mass index is 22.56 kg/m  as calculated from the following:    Height as of this encounter: 1.753 m (5' 9\").    Weight as of this encounter: 69.3 kg (152 lb 12.8 oz).        He reports that he has quit smoking. He has never used " smokeless tobacco.      Appropriate preventive services were discussed with this patient, including applicable screening as appropriate for cardiovascular disease, diabetes, osteopenia/osteoporosis, and glaucoma.  As appropriate for age/gender, discussed screening for colorectal cancer, prostate cancer, breast cancer, and cervical cancer. Checklist reviewing preventive services available has been given to the patient.    Reviewed patients plan of care and provided an AVS. The Basic Care Plan (routine screening as documented in Health Maintenance) for Cornel meets the Care Plan requirement. This Care Plan has been established and reviewed with the Patient.    Counseling Resources:  ATP IV Guidelines  Pooled Cohorts Equation Calculator  Breast Cancer Risk Calculator  Breast Cancer: Medication to Reduce Risk  FRAX Risk Assessment  ICSI Preventive Guidelines  Dietary Guidelines for Americans, 2010  USDA's MyPlate  ASA Prophylaxis  Lung CA Screening    Noah Brumfield MD  [facility]    Identified Health Risks:

## 2021-12-17 NOTE — LETTER
December 17, 2021      Cornel Yuen  [address]  [place] [address]        Dear ,    We are writing to inform you of your test results.    No signs of prostate cancer.   Your cholesterol is controlled.   You have normal kidney function, no signs of diabetes    Resulted Orders   Basic metabolic panel   Result Value Ref Range    Sodium 136 133 - 144 mmol/L    Potassium 3.6 3.4 - 5.3 mmol/L    Chloride 103 94 - 109 mmol/L    Carbon Dioxide (CO2) 28 20 - 32 mmol/L    Anion Gap 5 3 - 14 mmol/L    Urea Nitrogen 21 7 - 30 mg/dL    Creatinine 0.82 0.66 - 1.25 mg/dL    Calcium 8.9 8.5 - 10.1 mg/dL    Glucose 87 70 - 99 mg/dL    GFR Estimate 90 >60 mL/min/1.73m2      Comment:      As of July 11, 2021, eGFR is calculated by the CKD-EPI creatinine equation, without race adjustment. eGFR can be influenced by muscle mass, exercise, and diet. The reported eGFR is an estimation only and is only applicable if the renal function is stable.   Prostate Specific Antigen Screen   Result Value Ref Range    Prostate Specific Antigen Screen 0.75 0.00 - 4.00 ug/L    Narrative    Assay Method:  Chemiluminescence using Siemens   Vista analyzer.   Lipid panel reflex to direct LDL Fasting   Result Value Ref Range    Cholesterol 186 <200 mg/dL    Triglycerides 141 <150 mg/dL    Direct Measure HDL 67 >=40 mg/dL    LDL Cholesterol Calculated 91 <=100 mg/dL    Non HDL Cholesterol 119 <130 mg/dL    Patient Fasting > 8hrs? Yes       Comment:      on 12/16/2021   on 12/16/2021   on 12/16/2021   on 12/16/2021   on 12/16/2021   on 12/16/2021   on 12/16/2021    Narrative    Cholesterol  Desirable:  <200 mg/dL    Triglycerides  Normal:  Less than 150 mg/dL  Borderline High:  150-199 mg/dL  High:  200-499 mg/dL  Very High:  Greater than or equal to 500 mg/dL    Direct Measure HDL  Female:  Greater than or equal to 50 mg/dL   Male:  Greater than or equal to 40 mg/dL    LDL Cholesterol  Desirable:  <100mg/dL  Above Desirable:  100-129 mg/dL    Borderline High:  130-159 mg/dL   High:  160-189 mg/dL   Very High:  >= 190 mg/dL    Non HDL Cholesterol  Desirable:  130 mg/dL  Above Desirable:  130-159 mg/dL  Borderline High:  160-189 mg/dL  High:  190-219 mg/dL  Very High:  Greater than or equal to 220 mg/dL       If you have any questions or concerns, please call the clinic at the number listed above.       Sincerely,      Noah Brumfield MD

## 2022-05-24 ENCOUNTER — OFFICE VISIT (OUTPATIENT)
Dept: ORTHOPEDICS | Facility: CLINIC | Age: 72
End: 2022-05-24
Payer: COMMERCIAL

## 2022-05-24 VITALS
BODY MASS INDEX: 22.22 KG/M2 | SYSTOLIC BLOOD PRESSURE: 158 MMHG | WEIGHT: 150 LBS | DIASTOLIC BLOOD PRESSURE: 79 MMHG | HEIGHT: 69 IN

## 2022-05-24 DIAGNOSIS — M17.0 OSTEOARTHRITIS OF BOTH KNEES, UNSPECIFIED OSTEOARTHRITIS TYPE: ICD-10-CM

## 2022-05-24 DIAGNOSIS — G89.29 CHRONIC PAIN OF RIGHT KNEE: Primary | ICD-10-CM

## 2022-05-24 DIAGNOSIS — M25.561 CHRONIC PAIN OF RIGHT KNEE: Primary | ICD-10-CM

## 2022-05-24 PROCEDURE — 99213 OFFICE O/P EST LOW 20 MIN: CPT | Performed by: FAMILY MEDICINE

## 2022-05-24 NOTE — Clinical Note
2022         RE: Cornel Yuen  [address]  [place] [address]        Dear Colleague,    Thank you for referring your patient, Cornel Yuen, to the [facility] [place]. Please see a copy of my visit note below.    Cornel Yuen  :  1950  DOS: 22  MRN: [id number]    Sports Medicine Clinic Visit    PCP: Noah Brumfield    Cornel Yuen is a 70 year old male who is seen as a self referral presenting with bilateral knee pain.    Injury: No injury.  Pain located over bilateral knees, medial, nonradiating.  Additional Features:  Positive: swelling, popping and grinding.  Symptoms are better with Ice, Heat and Tylenol.  Symptoms are worse with: sitting, stairs and prolonged walking.  Other evaluation and/or treatments so far consists of: Ice, Heat, Tylenol, Ibuprofen, Rest and Elevation.  Recent imaging completed: No recent imaging completed.  Prior History of related problems: hx of meniscus surgery ~40 yrs ago    Social History: retired    Interim History - May 24, 2022  Since last visit on 9/10/2020 patient has mild waxing and waning right knee pain over the past 3+ months.  Patient notes that pain was constant until 3 - 4 days ago and now is improving.  Pain located mainly over posterior aspect of right knee with radiation to hamstring and posterior lower leg regions.  No interim injury.         Review of Systems  Musculoskeletal: as above  Remainder of review of systems is negative including constitutional, CV, pulmonary, GI, Skin and Neurologic except as noted in HPI or medical history.    Past Medical History:   Diagnosis Date     Actinic keratosis      Basal cell carcinoma      Essential hypertension, benign      Squamous cell carcinoma      Past Surgical History:   Procedure Laterality Date     HC REPAIR OF NASAL SEPTUM       HERNIA REPAIR, INGUINAL RT/LT       TONSILLECTOMY & ADENOIDECTOMY       ZZC ANESTH,KNEE JOINT; NOS       Family  "History   Problem Relation Age of Onset     Cancer Father         brain tumor     Diabetes Maternal Grandmother      Alzheimer Disease Maternal Grandmother      ABBY. Maternal Grandfather      Alzheimer Disease Maternal Grandfather      Unknown/Adopted Paternal Grandmother      ARONAIKE. Paternal Grandfather      Neurologic Disorder Mother         memory issues     Alzheimer Disease Mother      Asthma Daughter      Melanoma No family hx of        Objective  BP (!) 158/79   Ht 1.753 m (5' 9\")   Wt 68 kg (150 lb)   BMI 22.15 kg/m        General: healthy, alert and in no distress      HEENT: no scleral icterus or conjunctival erythema     Skin: no suspicious lesions or rash. No jaundice.     CV: regular rhythm by palpation, 2+ distal pulses, no pedal edema      Resp: normal respiratory effort without conversational dyspnea     Psych: normal mood and affect      Gait: mildly antalgic, appropriate coordination and balance     Neuro: normal light touch sensory exam of the extremities. Motor strength as noted below     Right Knee exam    ROM:        Full active and passive ROM with flexion and extension       Mildly limited terminal ROM on R>L due to mechanical joint changes    Inspection:       no visible ecchymosis       Trace effusion    Skin:       no visible deformities       well perfused       capillary refill brisk    Patellar Motion:        Normal patellar tracking noted through range of motion       Crepitus noted in the patellofemoral joint    Tender:        medial joint line very mild    Non Tender:         remainder of knee area        medial patellar border        lateral patellar border        lateral joint line        along MCL        infrapatellar tendon        tibial tubercle    Special Tests:        neg (-) Lachman       neg (-) varus at 0 deg and 30 deg       neg (-) valgus at 0 deg and 30 deg       Mild pain with terminal flexion on the right    Evaluation of ipsilateral kinetic chain       normal " strength with hip extension and abduction, tight right hamstring       Mildly decreased quad activation/conditioning, R>L      Radiology  Recent Results (from the past 744 hour(s))   XR Knee Standing Bilateral 3 Views    Narrative    KNEE STANDING BILATERAL THREE VIEWS  9/10/2020 9:44 AM     HISTORY: Acute pain of both knees.    COMPARISON: 1/7/2009.      Impression    IMPRESSION:   1. Right knee: Marked medial joint space narrowing has progressed  since the prior exam. There is associated mild medial marginal bony  spurring. No acute-appearing bony or soft tissue abnormality and no  joint space effusion.    2. Left knee: Moderate medial joint space narrowing without  significant change. No acute bony or soft tissue abnormality or joint  space effusion.    EDER SERRANO MD       Assessment:  1. Chronic pain of right knee    2. Osteoarthritis of both knees, unspecified osteoarthritis type        Plan:  Discussed the assessment with the patient.  Follow up: prn based on sx severity  Pain is recently better, seems to wax and wane  XR images independently visualized and reviewed with patient again today in clinic  R>L medial compartment DJD  Likely right is progressed faster due to old injury and meniscectomy  We discussed modified progressive pain-free activity as tolerated  Reviewed activity modification and progressive increase in activity as tolerated and guided by pain  Pt available in the future based on clinical progress  Reviewed options for potential steroid vs viscosupplementation injections and the possibility for future orthopedic referral prn  Reviewed safe and appropriate OTC medication choices, try tylenol first  Up to 3000mg daily of tylenol is generally safe, NSAID dosing and duration limitations reviewed  Discussed nature of degenerative arthrosis of the knee.   Discussed symptom treatment with ice or heat, topical treatments, and rest if needed.   Home handouts provided and supportive care  reviewed  All questions were answered today  Contact us with additional questions or concerns  Signs and sx of concern reviewed      Elmo Castro DO, BILL  Primary Care Sports Medicine  [facility]               Disclaimer: This note consists of symbols derived from keyboarding, dictation and/or voice recognition software. As a result, there may be errors in the script that have gone undetected. Please consider this when interpreting information found in this chart.      Again, thank you for allowing me to participate in the care of your patient.        Sincerely,        Elmo Castro DO

## 2022-05-24 NOTE — PROGRESS NOTES
Cornel Yuen  :  1950  DOS: 22  MRN: [id number]    Sports Medicine Clinic Visit    PCP: Noah Brumfield    Cornel Yuen is a 70 year old male who is seen as a self referral presenting with bilateral knee pain.    Injury: No injury.  Pain located over bilateral knees, medial, nonradiating.  Additional Features:  Positive: swelling, popping and grinding.  Symptoms are better with Ice, Heat and Tylenol.  Symptoms are worse with: sitting, stairs and prolonged walking.  Other evaluation and/or treatments so far consists of: Ice, Heat, Tylenol, Ibuprofen, Rest and Elevation.  Recent imaging completed: No recent imaging completed.  Prior History of related problems: hx of meniscus surgery ~40 yrs ago    Social History: retired    Interim History - May 24, 2022  Since last visit on 9/10/2020 patient has mild waxing and waning right knee pain over the past 3+ months.  Patient notes that pain was constant until 3 - 4 days ago and now is improving.  Pain located mainly over posterior aspect of right knee with radiation to hamstring and posterior lower leg regions.  No interim injury.         Review of Systems  Musculoskeletal: as above  Remainder of review of systems is negative including constitutional, CV, pulmonary, GI, Skin and Neurologic except as noted in HPI or medical history.    Past Medical History:   Diagnosis Date     Actinic keratosis      Basal cell carcinoma      Essential hypertension, benign      Squamous cell carcinoma      Past Surgical History:   Procedure Laterality Date     HC REPAIR OF NASAL SEPTUM       HERNIA REPAIR, INGUINAL RT/LT       TONSILLECTOMY & ADENOIDECTOMY       ZZC ANESTH,KNEE JOINT; NOS       Family History   Problem Relation Age of Onset     Cancer Father         brain tumor     Diabetes Maternal Grandmother      Alzheimer Disease Maternal Grandmother      C.A.D. Maternal Grandfather      Alzheimer Disease Maternal Grandfather      Unknown/Adopted Paternal  "Grandmother      ABBY. Paternal Grandfather      Neurologic Disorder Mother         memory issues     Alzheimer Disease Mother      Asthma Daughter      Melanoma No family hx of        Objective  BP (!) 158/79   Ht 1.753 m (5' 9\")   Wt 68 kg (150 lb)   BMI 22.15 kg/m        General: healthy, alert and in no distress      HEENT: no scleral icterus or conjunctival erythema     Skin: no suspicious lesions or rash. No jaundice.     CV: regular rhythm by palpation, 2+ distal pulses, no pedal edema      Resp: normal respiratory effort without conversational dyspnea     Psych: normal mood and affect      Gait: mildly antalgic, appropriate coordination and balance     Neuro: normal light touch sensory exam of the extremities. Motor strength as noted below     Right Knee exam    ROM:        Full active and passive ROM with flexion and extension       Mildly limited terminal ROM on R>L due to mechanical joint changes    Inspection:       no visible ecchymosis       Trace effusion    Skin:       no visible deformities       well perfused       capillary refill brisk    Patellar Motion:        Normal patellar tracking noted through range of motion       Crepitus noted in the patellofemoral joint    Tender:        medial joint line very mild    Non Tender:         remainder of knee area        medial patellar border        lateral patellar border        lateral joint line        along MCL        infrapatellar tendon        tibial tubercle    Special Tests:        neg (-) Lachman       neg (-) varus at 0 deg and 30 deg       neg (-) valgus at 0 deg and 30 deg       Mild pain with terminal flexion on the right    Evaluation of ipsilateral kinetic chain       normal strength with hip extension and abduction, tight right hamstring       Mildly decreased quad activation/conditioning, R>L      Radiology  Recent Results (from the past 744 hour(s))   XR Knee Standing Bilateral 3 Views    Narrative    KNEE STANDING BILATERAL THREE " VIEWS  9/10/2020 9:44 AM     HISTORY: Acute pain of both knees.    COMPARISON: 1/7/2009.      Impression    IMPRESSION:   1. Right knee: Marked medial joint space narrowing has progressed  since the prior exam. There is associated mild medial marginal bony  spurring. No acute-appearing bony or soft tissue abnormality and no  joint space effusion.    2. Left knee: Moderate medial joint space narrowing without  significant change. No acute bony or soft tissue abnormality or joint  space effusion.    EDER SERRANO MD       Assessment:  1. Chronic pain of right knee    2. Osteoarthritis of both knees, unspecified osteoarthritis type        Plan:  Discussed the assessment with the patient.  Follow up: prn based on sx severity  Pain is recently better, seems to wax and wane  XR images independently visualized and reviewed with patient again today in clinic  R>L medial compartment DJD  Likely right is progressed faster due to old injury and meniscectomy  We discussed modified progressive pain-free activity as tolerated  Reviewed activity modification and progressive increase in activity as tolerated and guided by pain  Pt available in the future based on clinical progress  Reviewed options for potential steroid vs viscosupplementation injections and the possibility for future orthopedic referral prn  Reviewed safe and appropriate OTC medication choices, try tylenol first  Up to 3000mg daily of tylenol is generally safe, NSAID dosing and duration limitations reviewed  Discussed nature of degenerative arthrosis of the knee.   Discussed symptom treatment with ice or heat, topical treatments, and rest if needed.   Home handouts provided and supportive care reviewed  All questions were answered today  Contact us with additional questions or concerns  Signs and sx of concern reviewed      Elmo Castro DO, CAQ  Primary Care Sports Medicine  [facility]               Disclaimer: This note consists of  symbols derived from keyboarding, dictation and/or voice recognition software. As a result, there may be errors in the script that have gone undetected. Please consider this when interpreting information found in this chart.

## 2022-06-01 ENCOUNTER — TELEPHONE (OUTPATIENT)
Dept: FAMILY MEDICINE | Facility: CLINIC | Age: 72
End: 2022-06-01
Payer: COMMERCIAL

## 2022-06-01 DIAGNOSIS — Z12.11 SCREENING FOR COLON CANCER: Primary | ICD-10-CM

## 2022-06-01 NOTE — TELEPHONE ENCOUNTER
Patient Quality Outreach    Patient is due for the following:   Colon Cancer Screening -  FIT  Immunizations  -  Covid, TD and Zoster    NEXT STEPS:   No follow up needed at this time.    Type of outreach:    Sent letter.    Next Steps:  Reach out within 90 days via Letter.    Max number of attempts reached: No. Will try again in 90 days if patient still on fail list.    Questions for provider review:    None     Sandy Iyer CMA  Chart routed to Care Team.

## 2022-06-01 NOTE — LETTER
June 1, 2022      Cornel Yuen  [address] [place] [address]      Your healthcare team cares about your health. To provide you with the best care,   we have reviewed your chart and based on our findings, we see that you are due to:     - COLON CANCER SCREENING:  Call or mychart the clinic to schedule your colonoscopy or schedule/ your FIT Test, or Cologuard test  - ADOLESCENT IMMUNIZATIONS/CHILDHOOD:  Schedule an appointment as they are due their immunizations. Here is a list of what is due or overdue: COVID, Td, and Shingles     Inside I included a FIT test for you to complete for the colon cancer screening, I see this was the method you previously preferred. Please complete FIT kit and mail back (no postage stamp is needed), thank you!    If you have already completed these items, please contact the clinic via phone or   Mychart so your care team can review and update your records. Thank you for   choosing [facility] for your healthcare needs. For any questions,   concerns, or to schedule an appointment please contact the clinic.       Healthy Regards,      Your [facility] Care Team  Dr. Brumfield's Care Team

## 2022-10-03 ENCOUNTER — TELEPHONE (OUTPATIENT)
Dept: FAMILY MEDICINE | Facility: CLINIC | Age: 72
End: 2022-10-03

## 2022-10-03 NOTE — TELEPHONE ENCOUNTER
Patient Quality Outreach    Patient is due for the following:   Colon Cancer Screening    Next Steps:   Schedule colon cancer screening    Type of outreach:    Sent letter.      Questions for provider review:    None     Shayy Chavez, CMA

## 2022-10-03 NOTE — LETTER
October 3, 2022      Cornel Yuen  [address]  [place] [address]        Dear Cornel,     Your healthcare team cares about your health. To provide you with the best care,   we have reviewed your chart and based on our findings, we see that you are due to:     - COLON CANCER SCREENING:  Call or mychart the clinic to schedule your colonoscopy or schedule/ your FIT Test, or Cologuard test   A FIT test was ordered for you on 6/1/22, please check the expiration date on the container before completing, contact the clinic if you need a new kit.    If you have already completed these items, please contact the clinic via phone or   Mychart so your care team can review and update your records. Thank you for   choosing [facility] for your healthcare needs. For any questions,   concerns, or to schedule an appointment please contact the clinic.       Healthy Regards,      Your [facility] Care Team

## 2022-10-15 ENCOUNTER — APPOINTMENT (OUTPATIENT)
Dept: LAB | Facility: CLINIC | Age: 72
End: 2022-10-15
Payer: COMMERCIAL

## 2022-10-15 PROCEDURE — 82274 ASSAY TEST FOR BLOOD FECAL: CPT

## 2022-10-17 DIAGNOSIS — Z12.11 SCREENING FOR COLON CANCER: ICD-10-CM

## 2022-10-17 LAB — HEMOCCULT STL QL IA: POSITIVE

## 2022-10-18 ENCOUNTER — TELEPHONE (OUTPATIENT)
Dept: FAMILY MEDICINE | Facility: CLINIC | Age: 72
End: 2022-10-18

## 2022-10-18 DIAGNOSIS — E78.5 HYPERLIPIDEMIA LDL GOAL <100: ICD-10-CM

## 2022-10-18 DIAGNOSIS — R19.5 POSITIVE FIT (FECAL IMMUNOCHEMICAL TEST): Primary | ICD-10-CM

## 2022-10-18 DIAGNOSIS — Z12.5 SCREENING FOR PROSTATE CANCER: ICD-10-CM

## 2022-10-18 DIAGNOSIS — I10 ESSENTIAL HYPERTENSION, BENIGN: Primary | ICD-10-CM

## 2022-10-18 NOTE — TELEPHONE ENCOUNTER
Reason for Call: Request for an order or referral:    Order or referral being requested: fasting labs    Date needed: before my next appointment    Has the patient been seen by the PCP for this problem? YES    Additional comments: pt has a lab appt 12/28. Pt is scheduled to see Dr Brumfield 12/30.    Phone number Patient can be reached at:  Home number on file [phone] (home)    Best Time:      Can we leave a detailed message on this number?  YES    Call taken on 10/18/2022 at 9:43 AM by Alecia Carlisle

## 2022-10-19 NOTE — TELEPHONE ENCOUNTER
Dr Brumfield,    Please see telephone msg.   Pt has lab appt scheduled 12/28 & an appt with you 12/30.  I have pended labs for your consideration.    Kimberly Banuelos RN

## 2022-12-06 ENCOUNTER — ANESTHESIA EVENT (OUTPATIENT)
Dept: GASTROENTEROLOGY | Facility: CLINIC | Age: 72
End: 2022-12-06
Payer: COMMERCIAL

## 2022-12-06 ASSESSMENT — LIFESTYLE VARIABLES: TOBACCO_USE: 1

## 2022-12-06 NOTE — ANESTHESIA PREPROCEDURE EVALUATION
Anesthesia Pre-Procedure Evaluation    Patient: Cornel Yuen   MRN: [id number] : 1950        Procedure : Procedure(s):  COLONOSCOPY          Past Medical History:   Diagnosis Date     Actinic keratosis      Basal cell carcinoma      Essential hypertension, benign      Squamous cell carcinoma       Past Surgical History:   Procedure Laterality Date     HC REPAIR OF NASAL SEPTUM       HERNIA REPAIR, INGUINAL RT/LT       TONSILLECTOMY & ADENOIDECTOMY       ZZC ANESTH,KNEE JOINT; NOS        Allergies   Allergen Reactions     Olmesartan Cramps     Amoxicillin Itching     Lisinopril Cough     Efudex [Fluorouracil] Rash     Intense reaction, itchy, rash, noted by dermatologist. Told by dermatologist to stop taking the medication due to the reaction     Hctz [Hydrochlorothiazide] Rash     Has a mild red generalized rash     Norvasc [Amlodipine]      Swelling of feet      Social History     Tobacco Use     Smoking status: Former     Smokeless tobacco: Never   Substance Use Topics     Alcohol use: Yes     Comment: 2-3 scotch per night      Wt Readings from Last 1 Encounters:   22 68 kg (150 lb)        Anesthesia Evaluation   Pt has had prior anesthetic.         ROS/MED HX  ENT/Pulmonary:     (+) tobacco use, Past use,     Neurologic:       Cardiovascular:     (+) Dyslipidemia hypertension-----    METS/Exercise Tolerance:     Hematologic:       Musculoskeletal:       GI/Hepatic:     (+) GERD,     Renal/Genitourinary:       Endo:       Psychiatric/Substance Use:       Infectious Disease:       Malignancy:   (+) Malignancy, History of Skin.    Other:            Physical Exam    Airway        Mallampati: II   TM distance: > 3 FB   Neck ROM: full   Mouth opening: > 3 cm    Respiratory Devices and Support  Comment: Not on oxygen currently       Dental  no notable dental history         Cardiovascular   cardiovascular exam normal          Pulmonary   pulmonary exam normal                OUTSIDE LABS:  CBC: No  results found for: WBC, HGB, HCT, PLT  BMP:   Lab Results   Component Value Date     12/17/2021     11/12/2020    POTASSIUM 3.6 12/17/2021    POTASSIUM 4.1 11/12/2020    CHLORIDE 103 12/17/2021    CHLORIDE 108 11/12/2020    CO2 28 12/17/2021    CO2 27 11/12/2020    BUN 21 12/17/2021    BUN 20 11/12/2020    CR 0.82 12/17/2021    CR 0.85 11/12/2020    GLC 87 12/17/2021    GLC 89 11/12/2020     COAGS: No results found for: PTT, INR, FIBR  POC: No results found for: BGM, HCG, HCGS  HEPATIC:   Lab Results   Component Value Date    ALT 44 02/11/2021     OTHER:   Lab Results   Component Value Date    NGOZI 8.9 12/17/2021       Anesthesia Plan    ASA Status:  3      Anesthesia Type: General.   Induction: Intravenous, Propofol.   Maintenance: TIVA.        Consents    Anesthesia Plan(s) and associated risks, benefits, and realistic alternatives discussed. Questions answered and patient/representative(s) expressed understanding.     - Discussed: Risks, Benefits and Alternatives for BOTH SEDATION and the PROCEDURE were discussed     - Discussed with:  Patient         Postoperative Care    Pain management: IV analgesics, Oral pain medications, Multi-modal analgesia.   PONV prophylaxis: Ondansetron (or other 5HT-3), Dexamethasone or Solumedrol     Comments:    Other Comments: Patient aware of plan, what to expect, and potential risks. Timeout was performed before bringing the patient back to OR, and once again, patient was asked if they had any questions            Quinn Sun CRNA, APRN MACHO

## 2022-12-07 RX ORDER — BISACODYL 5 MG
TABLET, DELAYED RELEASE (ENTERIC COATED) ORAL
Qty: 4 TABLET | Refills: 0 | Status: SHIPPED | OUTPATIENT
Start: 2022-12-07 | End: 2022-12-14

## 2022-12-14 ENCOUNTER — ANESTHESIA (OUTPATIENT)
Dept: GASTROENTEROLOGY | Facility: CLINIC | Age: 72
End: 2022-12-14
Payer: COMMERCIAL

## 2022-12-14 ENCOUNTER — HOSPITAL ENCOUNTER (OUTPATIENT)
Facility: CLINIC | Age: 72
Discharge: HOME | End: 2022-12-14
Attending: SURGERY | Admitting: SURGERY
Payer: COMMERCIAL

## 2022-12-14 VITALS
TEMPERATURE: 97.1 F | BODY MASS INDEX: 22.22 KG/M2 | DIASTOLIC BLOOD PRESSURE: 74 MMHG | WEIGHT: 150 LBS | SYSTOLIC BLOOD PRESSURE: 135 MMHG | RESPIRATION RATE: 16 BRPM | HEART RATE: 64 BPM | OXYGEN SATURATION: 96 % | HEIGHT: 69 IN

## 2022-12-14 DIAGNOSIS — Z12.11 SPECIAL SCREENING FOR MALIGNANT NEOPLASMS, COLON: Primary | ICD-10-CM

## 2022-12-14 LAB — COLONOSCOPY: NORMAL

## 2022-12-14 PROCEDURE — 250N000009 HC RX 250: Performed by: SURGERY

## 2022-12-14 PROCEDURE — 88305 TISSUE EXAM BY PATHOLOGIST: CPT | Mod: TC | Performed by: SURGERY

## 2022-12-14 PROCEDURE — 250N000011 HC RX IP 250 OP 636

## 2022-12-14 PROCEDURE — 45385 COLONOSCOPY W/LESION REMOVAL: CPT | Performed by: SURGERY

## 2022-12-14 PROCEDURE — 258N000003 HC RX IP 258 OP 636: Performed by: SURGERY

## 2022-12-14 PROCEDURE — 45380 COLONOSCOPY AND BIOPSY: CPT | Mod: 59 | Performed by: SURGERY

## 2022-12-14 PROCEDURE — 370N000017 HC ANESTHESIA TECHNICAL FEE, PER MIN: Performed by: SURGERY

## 2022-12-14 RX ORDER — NALOXONE HYDROCHLORIDE 0.4 MG/ML
0.2 INJECTION, SOLUTION INTRAMUSCULAR; INTRAVENOUS; SUBCUTANEOUS
Status: DISCONTINUED | OUTPATIENT
Start: 2022-12-14 | End: 2022-12-14 | Stop reason: HOSPADM

## 2022-12-14 RX ORDER — ONDANSETRON 4 MG/1
4 TABLET, ORALLY DISINTEGRATING ORAL EVERY 30 MIN PRN
Status: DISCONTINUED | OUTPATIENT
Start: 2022-12-14 | End: 2022-12-14 | Stop reason: HOSPADM

## 2022-12-14 RX ORDER — FENTANYL CITRATE 50 UG/ML
25 INJECTION, SOLUTION INTRAMUSCULAR; INTRAVENOUS EVERY 5 MIN PRN
Status: DISCONTINUED | OUTPATIENT
Start: 2022-12-14 | End: 2022-12-14 | Stop reason: HOSPADM

## 2022-12-14 RX ORDER — ONDANSETRON 2 MG/ML
4 INJECTION INTRAMUSCULAR; INTRAVENOUS EVERY 30 MIN PRN
Status: DISCONTINUED | OUTPATIENT
Start: 2022-12-14 | End: 2022-12-14 | Stop reason: HOSPADM

## 2022-12-14 RX ORDER — PROPOFOL 10 MG/ML
INJECTION, EMULSION INTRAVENOUS PRN
Status: DISCONTINUED | OUTPATIENT
Start: 2022-12-14 | End: 2022-12-14

## 2022-12-14 RX ORDER — MEPERIDINE HYDROCHLORIDE 25 MG/ML
12.5 INJECTION INTRAMUSCULAR; INTRAVENOUS; SUBCUTANEOUS
Status: DISCONTINUED | OUTPATIENT
Start: 2022-12-14 | End: 2022-12-14 | Stop reason: HOSPADM

## 2022-12-14 RX ORDER — HYDROMORPHONE HCL IN WATER/PF 6 MG/30 ML
0.4 PATIENT CONTROLLED ANALGESIA SYRINGE INTRAVENOUS EVERY 5 MIN PRN
Status: DISCONTINUED | OUTPATIENT
Start: 2022-12-14 | End: 2022-12-14 | Stop reason: HOSPADM

## 2022-12-14 RX ORDER — FENTANYL CITRATE 50 UG/ML
50 INJECTION, SOLUTION INTRAMUSCULAR; INTRAVENOUS EVERY 5 MIN PRN
Status: DISCONTINUED | OUTPATIENT
Start: 2022-12-14 | End: 2022-12-14 | Stop reason: HOSPADM

## 2022-12-14 RX ORDER — SODIUM CHLORIDE, SODIUM LACTATE, POTASSIUM CHLORIDE, CALCIUM CHLORIDE 600; 310; 30; 20 MG/100ML; MG/100ML; MG/100ML; MG/100ML
INJECTION, SOLUTION INTRAVENOUS CONTINUOUS
Status: DISCONTINUED | OUTPATIENT
Start: 2022-12-14 | End: 2022-12-14 | Stop reason: HOSPADM

## 2022-12-14 RX ORDER — NALOXONE HYDROCHLORIDE 0.4 MG/ML
0.4 INJECTION, SOLUTION INTRAMUSCULAR; INTRAVENOUS; SUBCUTANEOUS
Status: DISCONTINUED | OUTPATIENT
Start: 2022-12-14 | End: 2022-12-14 | Stop reason: HOSPADM

## 2022-12-14 RX ORDER — HYDROMORPHONE HCL IN WATER/PF 6 MG/30 ML
0.2 PATIENT CONTROLLED ANALGESIA SYRINGE INTRAVENOUS EVERY 5 MIN PRN
Status: DISCONTINUED | OUTPATIENT
Start: 2022-12-14 | End: 2022-12-14 | Stop reason: HOSPADM

## 2022-12-14 RX ORDER — ONDANSETRON 2 MG/ML
4 INJECTION INTRAMUSCULAR; INTRAVENOUS
Status: DISCONTINUED | OUTPATIENT
Start: 2022-12-14 | End: 2022-12-14 | Stop reason: HOSPADM

## 2022-12-14 RX ORDER — FLUMAZENIL 0.1 MG/ML
0.2 INJECTION, SOLUTION INTRAVENOUS
Status: CANCELLED | OUTPATIENT
Start: 2022-12-14 | End: 2022-12-14

## 2022-12-14 RX ORDER — FENTANYL CITRATE 50 UG/ML
25 INJECTION, SOLUTION INTRAMUSCULAR; INTRAVENOUS
Status: DISCONTINUED | OUTPATIENT
Start: 2022-12-14 | End: 2022-12-14 | Stop reason: HOSPADM

## 2022-12-14 RX ORDER — LIDOCAINE 40 MG/G
CREAM TOPICAL
Status: DISCONTINUED | OUTPATIENT
Start: 2022-12-14 | End: 2022-12-14 | Stop reason: HOSPADM

## 2022-12-14 RX ADMIN — PROPOFOL 100 MG: 10 INJECTION, EMULSION INTRAVENOUS at 10:41

## 2022-12-14 RX ADMIN — LIDOCAINE HYDROCHLORIDE 0.1 ML: 10 INJECTION, SOLUTION EPIDURAL; INFILTRATION; INTRACAUDAL; PERINEURAL at 10:08

## 2022-12-14 RX ADMIN — PROPOFOL 150 MCG/KG/MIN: 10 INJECTION, EMULSION INTRAVENOUS at 10:42

## 2022-12-14 RX ADMIN — PROPOFOL 100 MG: 10 INJECTION, EMULSION INTRAVENOUS at 10:39

## 2022-12-14 RX ADMIN — SODIUM CHLORIDE, POTASSIUM CHLORIDE, SODIUM LACTATE AND CALCIUM CHLORIDE: 600; 310; 30; 20 INJECTION, SOLUTION INTRAVENOUS at 10:07

## 2022-12-14 ASSESSMENT — ACTIVITIES OF DAILY LIVING (ADL): ADLS_ACUITY_SCORE: 35

## 2022-12-14 NOTE — ANESTHESIA POSTPROCEDURE EVALUATION
Patient: Cornel Yuen    Procedure: Procedure(s):  COLONOSCOPY, FLEXIBLE, WITH LESION REMOVAL USING SNARE       Anesthesia Type:  General    Note:  Disposition: Outpatient   Postop Pain Control: Uneventful            Sign Out: Well controlled pain   PONV: No   Neuro/Psych: Uneventful            Sign Out: Acceptable/Baseline neuro status   Airway/Respiratory: Uneventful            Sign Out: Acceptable/Baseline resp. status   CV/Hemodynamics: Uneventful            Sign Out: Acceptable CV status; No obvious hypovolemia; No obvious fluid overload   Other NRE:    DID A NON-ROUTINE EVENT OCCUR?            Last vitals:  Vitals Value Taken Time   /75 12/14/22 1103   Temp 36.2  C (97.1  F) 12/14/22 1103   Pulse 78 12/14/22 1103   Resp 16 12/14/22 1103   SpO2 96 % 12/14/22 1108   Vitals shown include unvalidated device data.    Electronically Signed By: ISABELLA Paige CRNA  December 14, 2022  11:10 AM

## 2022-12-14 NOTE — ANESTHESIA CARE TRANSFER NOTE
Patient: Cornel Yuen    Procedure: Procedure(s):  COLONOSCOPY, FLEXIBLE, WITH LESION REMOVAL USING SNARE       Diagnosis: Positive FIT (fecal immunochemical test) [R19.5]  Diagnosis Additional Information: No value filed.    Anesthesia Type:   General     Note:    Oropharynx: oropharynx clear of all foreign objects  Level of Consciousness: drowsy      Independent Airway: airway patency satisfactory and stable  Dentition: dentition unchanged  Vital Signs Stable: post-procedure vital signs reviewed and stable  Report to RN Given: handoff report given  Patient transferred to: Phase II    Handoff Report: Identifed the Patient, Identified the Reponsible Provider, Reviewed the pertinent medical history, Discussed the surgical course, Reviewed Intra-OP anesthesia mangement and issues during anesthesia, Set expectations for post-procedure period and Allowed opportunity for questions and acknowledgement of understanding      Vitals:  Vitals Value Taken Time   /75 12/14/22 1103   Temp 36.2  C (97.1  F) 12/14/22 1103   Pulse 78 12/14/22 1103   Resp 16 12/14/22 1103   SpO2 96 % 12/14/22 1108   Vitals shown include unvalidated device data.    Electronically Signed By: ISABELLA Paige CRNA  December 14, 2022  11:10 AM

## 2022-12-14 NOTE — H&P
72 year old year old male here for colonoscopy for positive FIT test.  Last colonoscopy was 2010, negative.   Patient denies visible blood in stool or change in stool caliber.  There is no known family history of colon cancer or polyps.    Patient Active Problem List   Diagnosis     Essential hypertension, benign     ESOPHAGEAL REFLUX     Impotence of organic origin     HYPERLIPIDEMIA LDL GOAL <130     Advanced directives, counseling/discussion     ED (erectile dysfunction)     Mixed hearing loss, unilateral       Past Medical History:   Diagnosis Date     Actinic keratosis      Basal cell carcinoma      Essential hypertension, benign      Squamous cell carcinoma        Past Surgical History:   Procedure Laterality Date     HC REPAIR OF NASAL SEPTUM       HERNIA REPAIR, INGUINAL RT/LT       TONSILLECTOMY & ADENOIDECTOMY       ZZC ANESTH,KNEE JOINT; NOS         Family History   Problem Relation Age of Onset     Cancer Father         brain tumor     Diabetes Maternal Grandmother      Alzheimer Disease Maternal Grandmother      C.A.D. Maternal Grandfather      Alzheimer Disease Maternal Grandfather      Unknown/Adopted Paternal Grandmother      C.A.D. Paternal Grandfather      Neurologic Disorder Mother         memory issues     Alzheimer Disease Mother      Asthma Daughter      Melanoma No family hx of        Current Outpatient Rx   Medication Sig Dispense Refill     bisacodyl (DULCOLAX) 5 MG EC tablet Take 2 tablets at 3 pm the day before your procedure. If your procedure is before 11 am, take 2 additional tablets at 11 pm. If your procedure is after 11 am, take 2 additional tablets at 6 am. For additional instructions refer to your colonoscopy prep instructions. 4 tablet 0     polyethylene glycol (GOLYTELY) 236 g suspension The night before the exam at 6 pm drink an 8-ounce glass every 15 minutes until the jug is half empty. If you arrive before 11 AM: Drink the other half of the [employer] jug at 11 PM night before  "procedure. If you arrive after 11 AM: Drink the other half of the [employer] jug at 6 AM day of procedure. For additional instructions refer to your colonoscopy prep instructions. 4000 mL 0       Allergies   Allergen Reactions     Olmesartan Cramps     Amoxicillin Itching     Lisinopril Cough     Efudex [Fluorouracil] Rash     Intense reaction, itchy, rash, noted by dermatologist. Told by dermatologist to stop taking the medication due to the reaction     Hctz [Hydrochlorothiazide] Rash     Has a mild red generalized rash     Norvasc [Amlodipine]      Swelling of feet       Pt reports that he has quit smoking. He has never used smokeless tobacco. He reports current alcohol use. He reports current drug use.    Exam:  BP (!) 168/98 (BP Location: Left arm)   Pulse 86   Ht 1.753 m (5' 9\")   Wt 68 kg (150 lb)   SpO2 99%   BMI 22.15 kg/m      Awake, Alert OX3  Lungs - CTA bilaterally  CV - RRR, no murmurs, distal pulses intact  Abd - soft, non-distended, non-tender, +BS  Extr - No cyanosis or edema    A/P 72 year old year old male in need of colonoscopy for positive FIT test. Risks, benefits, alternatives, and complications were discussed including the possibility of perforation, bleeding, missed lesion and the patient agreed to proceed    Timmy Rodrigues,  on 12/14/2022 at 10:11 AM    "

## 2022-12-14 NOTE — LETTER
Cornel Yuen  [address]  [place] [address]      December 22, 2022    Dear Cornel,  This letter is written to inform you of the results of your recent colonoscopy.  Your examination showed polyp(s) in your cecum and ascending colon. All polyps were removed in their entirety and sent for review by a pathologist. As you will see on the pathology report below, the tissue(s) were tubular adenomatous polyps. Your examination also showed diverticulosis.    Final Diagnosis   A.  Colon, cecum: Polypectomy:  - Tubular adenoma  - No evidence of high-grade dysplasia or invasive malignancy      B.  Colon, random: Biopsy:  - Colonic mucosa within normal limits  - No active or chronic colitis  - No lymphocytic or collagenous colitis      C.  Colon, ascending: Polypectomy:  - Tubular adenoma  - No evidence of high-grade dysplasia or invasive malignancy       Diverticulosis can be described as small outpouchings (pockets) in your colon wall. This is an entirely benign (non-cancerous) finding.  Adenomatous polyps are entirely benign (non-cancerous); however, patients who have developed these polyps are at an increased risk for developing additional polyps in the future. If these are not eventually removed, there is a risk of developing colon cancer. We will advise more frequent examinations with you because of the risk associated with this type of polyp.    Given these findings, your personal history of polyps, I recommend that you undergo a repeat colonoscopy in 5 year(s) for surveillance. We will enter you into a recall system so you receive a reminder closer to the time that you are due for repeat examination.     Please remember that this recommendation is made with the understanding that you are not experiencing persistent changes in bowel function, bleeding per rectum, and/or significant abdominal pain. If you experience these symptoms, please contact your primary care provider for a further evaluation.     If  you have any questions or concerns about the results of your colonoscopy or the appropriate follow-up, please contact my assistant at [phone]    Sincerely,      Timmy Rodrigues,    [facility]  ___

## 2022-12-15 LAB
PATH REPORT.COMMENTS IMP SPEC: NORMAL
PATH REPORT.COMMENTS IMP SPEC: NORMAL
PATH REPORT.FINAL DX SPEC: NORMAL
PATH REPORT.GROSS SPEC: NORMAL
PATH REPORT.MICROSCOPIC SPEC OTHER STN: NORMAL
PATH REPORT.RELEVANT HX SPEC: NORMAL
PHOTO IMAGE: NORMAL

## 2022-12-15 PROCEDURE — 88305 TISSUE EXAM BY PATHOLOGIST: CPT | Mod: 26 | Performed by: PATHOLOGY

## 2022-12-25 DIAGNOSIS — I10 ESSENTIAL HYPERTENSION, BENIGN: ICD-10-CM

## 2022-12-26 RX ORDER — LOSARTAN POTASSIUM 25 MG/1
25 TABLET ORAL DAILY
Qty: 90 TABLET | Refills: 0 | Status: SHIPPED | OUTPATIENT
Start: 2022-12-26 | End: 2022-12-30

## 2022-12-26 NOTE — TELEPHONE ENCOUNTER
"Requested Prescriptions   Pending Prescriptions Disp Refills    losartan (COZAAR) 25 MG tablet [Pharmacy Med Name: losartan 25 mg tablet] 90 tablet 3     Sig: Take 1 tablet (25 mg) by mouth daily       Angiotensin-II Receptors Failed - 12/25/2022  8:00 AM        Failed - Normal serum creatinine on file in past 12 months     Recent Labs   Lab Test 12/17/21  0906   CR 0.82       Ok to refill medication if creatinine is low          Failed - Normal serum potassium on file in past 12 months     Recent Labs   Lab Test 12/17/21  0906   POTASSIUM 3.6                    Passed - Last blood pressure under 140/90 in past 12 months     BP Readings from Last 3 Encounters:   12/14/22 135/74   05/24/22 (!) 158/79   12/17/21 132/72                 Passed - Recent (12 mo) or future (30 days) visit within the authorizing provider's specialty     Patient has had an office visit with the authorizing provider or a provider within the authorizing providers department within the previous 12 mos or has a future within next 30 days. See \"Patient Info\" tab in inbasket, or \"Choose Columns\" in Meds & Orders section of the refill encounter.              Passed - Medication is active on med list        Passed - Patient is age 18 or older             "

## 2022-12-28 ENCOUNTER — LAB (OUTPATIENT)
Dept: LAB | Facility: CLINIC | Age: 72
End: 2022-12-28
Payer: COMMERCIAL

## 2022-12-28 DIAGNOSIS — Z12.5 SCREENING FOR PROSTATE CANCER: ICD-10-CM

## 2022-12-28 DIAGNOSIS — I10 ESSENTIAL HYPERTENSION, BENIGN: ICD-10-CM

## 2022-12-28 DIAGNOSIS — E78.5 HYPERLIPIDEMIA LDL GOAL <100: ICD-10-CM

## 2022-12-28 LAB
ANION GAP SERPL CALCULATED.3IONS-SCNC: 9 MMOL/L (ref 7–15)
BUN SERPL-MCNC: 14.1 MG/DL (ref 8–23)
CALCIUM SERPL-MCNC: 9.3 MG/DL (ref 8.8–10.2)
CHLORIDE SERPL-SCNC: 102 MMOL/L (ref 98–107)
CHOLEST SERPL-MCNC: 187 MG/DL
CREAT SERPL-MCNC: 0.96 MG/DL (ref 0.67–1.17)
DEPRECATED HCO3 PLAS-SCNC: 27 MMOL/L (ref 22–29)
GFR SERPL CREATININE-BSD FRML MDRD: 85 ML/MIN/1.73M2
GLUCOSE SERPL-MCNC: 99 MG/DL (ref 70–99)
HDLC SERPL-MCNC: 70 MG/DL
LDLC SERPL CALC-MCNC: 84 MG/DL
NONHDLC SERPL-MCNC: 117 MG/DL
POTASSIUM SERPL-SCNC: 4.1 MMOL/L (ref 3.4–5.3)
PSA SERPL-MCNC: 0.73 NG/ML (ref 0–6.5)
SODIUM SERPL-SCNC: 138 MMOL/L (ref 136–145)
TRIGL SERPL-MCNC: 166 MG/DL

## 2022-12-28 PROCEDURE — G0103 PSA SCREENING: HCPCS

## 2022-12-28 PROCEDURE — 36415 COLL VENOUS BLD VENIPUNCTURE: CPT

## 2022-12-28 PROCEDURE — 80061 LIPID PANEL: CPT

## 2022-12-28 PROCEDURE — 80048 BASIC METABOLIC PNL TOTAL CA: CPT

## 2022-12-30 ENCOUNTER — OFFICE VISIT (OUTPATIENT)
Dept: FAMILY MEDICINE | Facility: CLINIC | Age: 72
End: 2022-12-30
Payer: COMMERCIAL

## 2022-12-30 VITALS
OXYGEN SATURATION: 99 % | BODY MASS INDEX: 23.4 KG/M2 | DIASTOLIC BLOOD PRESSURE: 78 MMHG | HEART RATE: 64 BPM | SYSTOLIC BLOOD PRESSURE: 138 MMHG | WEIGHT: 158 LBS | RESPIRATION RATE: 16 BRPM | HEIGHT: 69 IN | TEMPERATURE: 97.8 F

## 2022-12-30 DIAGNOSIS — I10 ESSENTIAL HYPERTENSION, BENIGN: ICD-10-CM

## 2022-12-30 DIAGNOSIS — E78.5 HYPERLIPIDEMIA LDL GOAL <100: ICD-10-CM

## 2022-12-30 DIAGNOSIS — Z23 HIGH PRIORITY FOR 2019-NCOV VACCINE: ICD-10-CM

## 2022-12-30 DIAGNOSIS — Z23 NEED FOR VACCINATION: ICD-10-CM

## 2022-12-30 DIAGNOSIS — Z00.00 ENCOUNTER FOR MEDICARE ANNUAL WELLNESS EXAM: Primary | ICD-10-CM

## 2022-12-30 PROCEDURE — 90714 TD VACC NO PRESV 7 YRS+ IM: CPT | Performed by: FAMILY MEDICINE

## 2022-12-30 PROCEDURE — 0134A COVID-19 VACCINE BIVALENT BOOSTER 18+ (MODERNA): CPT | Performed by: FAMILY MEDICINE

## 2022-12-30 PROCEDURE — 99214 OFFICE O/P EST MOD 30 MIN: CPT | Mod: 25 | Performed by: FAMILY MEDICINE

## 2022-12-30 PROCEDURE — 90471 IMMUNIZATION ADMIN: CPT | Performed by: FAMILY MEDICINE

## 2022-12-30 PROCEDURE — G0439 PPPS, SUBSEQ VISIT: HCPCS | Performed by: FAMILY MEDICINE

## 2022-12-30 PROCEDURE — 91313 COVID-19 VACCINE BIVALENT BOOSTER 18+ (MODERNA): CPT | Performed by: FAMILY MEDICINE

## 2022-12-30 RX ORDER — ATORVASTATIN CALCIUM 10 MG/1
10 TABLET, FILM COATED ORAL DAILY
Qty: 90 TABLET | Refills: 3 | Status: SHIPPED | OUTPATIENT
Start: 2022-12-30 | End: 2023-12-15

## 2022-12-30 RX ORDER — LOSARTAN POTASSIUM 25 MG/1
25 TABLET ORAL DAILY
Qty: 90 TABLET | Refills: 3 | Status: SHIPPED | OUTPATIENT
Start: 2022-12-30 | End: 2023-12-15

## 2022-12-30 RX ORDER — ATENOLOL 25 MG/1
25 TABLET ORAL DAILY
Qty: 90 TABLET | Refills: 3 | Status: SHIPPED | OUTPATIENT
Start: 2022-12-30 | End: 2023-12-15

## 2022-12-30 ASSESSMENT — ENCOUNTER SYMPTOMS
EYE PAIN: 0
NERVOUS/ANXIOUS: 0
CONSTIPATION: 0
MYALGIAS: 0
HEMATOCHEZIA: 0
DIZZINESS: 0
DIARRHEA: 0
DYSURIA: 0
FEVER: 0
NAUSEA: 0
PARESTHESIAS: 0
PALPITATIONS: 0
COUGH: 0
WEAKNESS: 0
FREQUENCY: 0
ARTHRALGIAS: 1
SHORTNESS OF BREATH: 0
HEMATURIA: 0
JOINT SWELLING: 1
ABDOMINAL PAIN: 0
HEARTBURN: 0
SORE THROAT: 0
HEADACHES: 0
CHILLS: 0

## 2022-12-30 ASSESSMENT — PAIN SCALES - GENERAL: PAINLEVEL: NO PAIN (0)

## 2022-12-30 ASSESSMENT — ACTIVITIES OF DAILY LIVING (ADL): CURRENT_FUNCTION: NO ASSISTANCE NEEDED

## 2022-12-30 NOTE — NURSING NOTE
"Chief Complaint   Patient presents with     Physical       Initial There were no vitals taken for this visit. Estimated body mass index is 22.15 kg/m  as calculated from the following:    Height as of 12/14/22: 1.753 m (5' 9\").    Weight as of 12/14/22: 68 kg (150 lb).    Patient presents to the clinic using No DME    Is there anyone who you would like to be able to receive your results? No  If yes have patient fill out SOFI    "

## 2022-12-30 NOTE — PROGRESS NOTES
"SUBJECTIVE:   Kayden is a 72 year old who presents for Preventive Visit.  Patient has been advised of split billing requirements and indicates understanding: Yes  Are you in the first 12 months of your Medicare coverage?  No    Healthy Habits:     In general, how would you rate your overall health?  Good    Frequency of exercise:  None    Do you usually eat at least 4 servings of fruit and vegetables a day, include whole grains    & fiber and avoid regularly eating high fat or \"junk\" foods?  Yes    Taking medications regularly:  Yes    Ability to successfully perform activities of daily living:  No assistance needed    Home Safety:  No safety concerns identified    Hearing Impairment:  Difficulty following a conversation in a noisy restaurant or crowded room, need to ask people to speak up or repeat themselves and difficulty understanding soft or whispered speech    In the past 6 months, have you been bothered by leaking of urine?  No    In general, how would you rate your overall mental or emotional health?  Good      PHQ-2 Total Score: 0    Additional concerns today:  Yes      Have you ever done Advance Care Planning? (For example, a Health Directive, POLST, or a discussion with a medical provider or your loved ones about your wishes): No, advance care planning information given to patient to review.  Patient plans to discuss their wishes with loved ones or provider.         Fall risk  Fallen 2 or more times in the past year?: No  Any fall with injury in the past year?: No  click delete button to remove this line now  Cognitive Screening   1) Repeat 3 items (Leader, Season, Table)    2) Clock draw: NORMAL  3) 3 item recall: Recalls 3 objects  Results: 3 items recalled: COGNITIVE IMPAIRMENT LESS LIKELY    Mini-CogTM Copyright MISAEL Oropeza. Licensed by the author for use in [facility]; reprinted with permission (jordan@.[facility]). All rights reserved.      Do you have sleep apnea, excessive snoring or daytime " drowsiness?: no    Reviewed and updated as needed this visit by clinical staff   Tobacco  Allergies  Meds              Reviewed and updated as needed this visit by Provider                 Social History     Tobacco Use     Smoking status: Former     Smokeless tobacco: Never   Substance Use Topics     Alcohol use: Yes     Comment: 2-3 scotch per night     If you drink alcohol do you typically have >3 drinks per day or >7 drinks per week? Yes      Alcohol Use 12/30/2022   Prescreen: >3 drinks/day or >7 drinks/week? Yes   Prescreen: >3 drinks/day or >7 drinks/week? -   AUDIT SCORE  -       BP is controlled, no side effect of meds.  Reviewed diet for cholesterol, taking med no side effects.  He admits to have some cocktails at times, reviewed etiology of hypertriglyceridemia.        Current providers sharing in care for this patient include:   Patient Care Team:  Noah Brumfield MD as PCP - General  Noah Brumfield MD as Assigned PCP  Edin De La Cruz MD as Assigned Surgical Provider  Elmo Castro DO as Assigned Musculoskeletal Provider    The following health maintenance items are reviewed in Epic and correct as of today:  Health Maintenance   Topic Date Due     LUNG CANCER SCREENING  Never done     ZOSTER IMMUNIZATION (2 of 3) 07/04/2017     AORTIC ANEURYSM SCREENING (SYSTEM ASSIGNED)  Never done     DTAP/TDAP/TD IMMUNIZATION (3 - Td or Tdap) 05/30/2022     COVID-19 Vaccine (5 - Booster for Moderna series) 08/31/2022     MEDICARE ANNUAL WELLNESS VISIT  12/17/2022     ANNUAL REVIEW OF HM ORDERS  12/30/2023     FALL RISK ASSESSMENT  12/30/2023     COLORECTAL CANCER SCREENING  12/14/2027     LIPID  12/28/2027     ADVANCE CARE PLANNING  12/30/2027     PHQ-2 (once per calendar year)  Completed     INFLUENZA VACCINE  Completed     Pneumococcal Vaccine: 65+ Years  Completed     IPV IMMUNIZATION  Aged Out     MENINGITIS IMMUNIZATION  Aged Out     HEPATITIS C SCREENING  Discontinued  "              Review of Systems   Constitutional: Negative for chills and fever.   HENT: Positive for hearing loss. Negative for congestion, ear pain and sore throat.    Eyes: Negative for pain and visual disturbance.   Respiratory: Negative for cough and shortness of breath.    Cardiovascular: Negative for chest pain, palpitations and peripheral edema.   Gastrointestinal: Negative for abdominal pain, constipation, diarrhea, heartburn, hematochezia and nausea.   Genitourinary: Positive for impotence. Negative for dysuria, frequency, genital sores, hematuria, penile discharge and urgency.   Musculoskeletal: Positive for arthralgias and joint swelling. Negative for myalgias.   Skin: Negative for rash.   Neurological: Negative for dizziness, weakness, headaches and paresthesias.   Psychiatric/Behavioral: Negative for mood changes. The patient is not nervous/anxious.          OBJECTIVE:   /78 (BP Location: Left arm, Patient Position: Sitting, Cuff Size: Adult Large)   Pulse 64   Temp 97.8  F (36.6  C) (Tympanic)   Resp 16   Ht 1.752 m (5' 8.98\")   Wt 71.7 kg (158 lb)   SpO2 99%   BMI 23.35 kg/m   Estimated body mass index is 23.35 kg/m  as calculated from the following:    Height as of this encounter: 1.752 m (5' 8.98\").    Weight as of this encounter: 71.7 kg (158 lb).  Physical Exam  GENERAL: healthy, alert and no distress  EYES: Eyes grossly normal to inspection, PERRL and conjunctivae and sclerae normal  HENT: ear canals and TM's normal, nose and mouth without ulcers or lesions  NECK: no adenopathy, no asymmetry, masses, or scars and thyroid normal to palpation  RESP: lungs clear to auscultation - no rales, rhonchi or wheezes  CV: regular rate and rhythm, normal S1 S2, no S3 or S4, no murmur, click or rub, no peripheral edema and peripheral pulses strong  ABDOMEN: soft, nontender, no hepatosplenomegaly, no masses and bowel sounds normal  MS: no gross musculoskeletal defects noted, no edema  SKIN: no " suspicious lesions or rashes  NEURO: Normal strength and tone, mentation intact and speech normal  PSYCH: mentation appears normal, affect normal/bright  LYMPH: anterior cervical: no adenopathy  posterior cervical: no adenopathy    Reviewed labs    ASSESSMENT / PLAN:   (Z00.00) Encounter for Medicare annual wellness exam  (primary encounter diagnosis)  Comment: Discussed healthy lifestyle and preventative cares.    Plan:     (I10) BENIGN HYPERTENSION  Comment: controlled on med  Plan: atenolol (TENORMIN) 25 MG tablet, losartan         (COZAAR) 25 MG tablet        Refilled and reviewed lab    (E78.5) Hyperlipidemia LDL goal <100  Comment: controlled, refilled med and reviewed lab  Plan: atorvastatin (LIPITOR) 10 MG tablet              Patient has been advised of split billing requirements and indicates understanding: Yes      COUNSELING:  Reviewed preventive health counseling, as reflected in patient instructions       Regular exercise       Healthy diet/nutrition       Colon cancer screening       Prostate cancer screening        He reports that he has quit smoking. He has never used smokeless tobacco.      Appropriate preventive services were discussed with this patient, including applicable screening as appropriate for cardiovascular disease, diabetes, osteopenia/osteoporosis, and glaucoma.  As appropriate for age/gender, discussed screening for colorectal cancer, prostate cancer, breast cancer, and cervical cancer. Checklist reviewing preventive services available has been given to the patient.    Reviewed patients plan of care and provided an AVS. The Basic Care Plan (routine screening as documented in Health Maintenance) for Cornel meets the Care Plan requirement. This Care Plan has been established and reviewed with the Patient.      Noah Brumfield MD  [facility]    Identified Health Risks:

## 2022-12-30 NOTE — PATIENT INSTRUCTIONS
Please get your next colonoscopy in 5 years.    I refilled your medications for the year.    Please be aware that there will be an additional charge during your preventative visit due to either a new diagnosis and/or chronic disease management.    Preventative visits screen for diseases prior to they occur.  They do not cover for any new diagnosis or chronic disease management which would include medication refills, labs etc.    If you have questions regarding your coverage please check with your insurance provider.  At [facility] we need to code correctly to be in compliance with all insurance companies.        Thank you for choosing [facility].  You may be receiving an email and/or telephone survey request from [facility] Customer Experience regarding your visit today.  Please take a few minutes to respond to the survey to let us know how we are doing.      If you have questions or concerns, please contact us via [facility] or you can contact your care team at [phone] option [phone].    Our Clinic hours are:  Monday - Thursday 7am-6pm  Friday 7am-5pm    The [place] outpatient lab hours are:  Monday - Friday 7am-4:30pm    Appointments are required, call [phone]    If you have clinical questions after hours or would like to schedule an appointment,  call the clinic at [phone].    Patient Education   Personalized Prevention Plan  You are due for the preventive services outlined below.  Your care team is available to assist you in scheduling these services.  If you have already completed any of these items, please share that information with your care team to update in your medical record.  Health Maintenance Due   Topic Date Due    LUNG CANCER SCREENING  Never done    Zoster (Shingles) Vaccine (2 of 3) 07/04/2017    AORTIC ANEURYSM SCREENING (SYSTEM ASSIGNED)  Never done    Diptheria Tetanus Pertussis (DTAP/TDAP/TD) Vaccine (3 - Td or Tdap) 05/30/2022    COVID-19 Vaccine (5 - Booster for Moderna series)  08/31/2022    Annual Wellness Visit  12/17/2022

## 2023-03-14 ENCOUNTER — OFFICE VISIT (OUTPATIENT)
Dept: DERMATOLOGY | Facility: CLINIC | Age: 73
End: 2023-03-14
Payer: COMMERCIAL

## 2023-03-14 DIAGNOSIS — L82.1 SEBORRHEIC KERATOSIS: ICD-10-CM

## 2023-03-14 DIAGNOSIS — L81.4 LENTIGO: ICD-10-CM

## 2023-03-14 DIAGNOSIS — L57.0 AK (ACTINIC KERATOSIS): Primary | ICD-10-CM

## 2023-03-14 DIAGNOSIS — Z85.828 HISTORY OF SKIN CANCER: ICD-10-CM

## 2023-03-14 DIAGNOSIS — D23.9 DERMAL NEVUS: ICD-10-CM

## 2023-03-14 DIAGNOSIS — D18.01 ANGIOMA OF SKIN: ICD-10-CM

## 2023-03-14 PROCEDURE — 17000 DESTRUCT PREMALG LESION: CPT | Performed by: DERMATOLOGY

## 2023-03-14 PROCEDURE — 99213 OFFICE O/P EST LOW 20 MIN: CPT | Mod: 25 | Performed by: DERMATOLOGY

## 2023-03-14 ASSESSMENT — PAIN SCALES - GENERAL: PAINLEVEL: NO PAIN (0)

## 2023-03-14 NOTE — PROGRESS NOTES
Cornel Yuen is an extremely pleasant 72 year old year old male patient here today for hx of non-melanoma skin cancer he denies any new or changing  Skin lesions.  Patient has no other skin complaints today.  Remainder of the HPI, Meds, PMH, Allergies, FH, and SH was reviewed in chart.      Past Medical History:   Diagnosis Date     Actinic keratosis      Basal cell carcinoma      Essential hypertension, benign      Squamous cell carcinoma        Past Surgical History:   Procedure Laterality Date     COLONOSCOPY N/A 12/14/2022    Procedure: COLONOSCOPY, FLEXIBLE, WITH LESION REMOVAL USING SNARE;  Surgeon: Timmy Rodrigues DO;  Location: [facility]     [facility] REPAIR OF NASAL SEPTUM       HERNIA REPAIR, INGUINAL RT/LT       TONSILLECTOMY & ADENOIDECTOMY       ZZC ANESTH,KNEE JOINT; NOS          Family History   Problem Relation Age of Onset     Cancer Father         brain tumor     Diabetes Maternal Grandmother      Alzheimer Disease Maternal Grandmother      C.A.D. Maternal Grandfather      Alzheimer Disease Maternal Grandfather      Unknown/Adopted Paternal Grandmother      C.A.D. Paternal Grandfather      Neurologic Disorder Mother         memory issues     Alzheimer Disease Mother      Asthma Daughter      Melanoma No family hx of        Social History     Socioeconomic History     Marital status:      Spouse name: Not on file     Number of children: Not on file     Years of education: Not on file     Highest education level: Not on file   Occupational History     Not on file   Tobacco Use     Smoking status: Former     Smokeless tobacco: Never   Vaping Use     Vaping Use: Never used   Substance and Sexual Activity     Alcohol use: Yes     Comment: 2-3 scotch per night     Drug use: Yes     Comment: occasional- marijuana     Sexual activity: Yes     Partners: Female     Birth control/protection: Surgical     Comment: vasectomy   Other Topics Concern     Parent/sibling w/ CABG, MI or angioplasty before  65F 55M? No   Social History Narrative     Not on file     Social Determinants of Health     Financial Resource Strain: Not on file   Food Insecurity: Not on file   Transportation Needs: Not on file   Physical Activity: Not on file   Stress: Not on file   Social Connections: Not on file   Intimate Partner Violence: Not on file   Housing Stability: Not on file       Outpatient Encounter Medications as of 3/14/2023   Medication Sig Dispense Refill     ASPIRIN 81 MG OR TABS 1 TABLET DAILY       atenolol (TENORMIN) 25 MG tablet Take 1 tablet (25 mg) by mouth daily 90 tablet 3     atorvastatin (LIPITOR) 10 MG tablet Take 1 tablet (10 mg) by mouth daily 90 tablet 3     GLUCOSAMINE-CHONDROITIN 750-600 MG OR TABS 1 tab daily 0 0     losartan (COZAAR) 25 MG tablet Take 1 tablet (25 mg) by mouth daily 90 tablet 3     No facility-administered encounter medications on file as of 3/14/2023.             O:   NAD, WDWN, Alert & Oriented, Mood & Affect wnl, Vitals stable   Here today alone    General appearance normal   Vitals stable   Alert, oriented and in no acute distress     L orbit gritty scaly papule    Stuck on papules and brown macules on trunk and ext   Red papules on trunk  Flesh colored papules on trunk     The remainder of the full exam was normal; the following areas were examined:  conjunctiva/lids, , neck, peripheral vascular system, abdomen, lymph nodes, digits/nails, eccrine and apocrine glands, scalp/hair, face, neck, chest, abdomen, buttocks, back, RUE, LUE, RLE, LLE       Eyes: Conjunctivae/lids:Normal     ENT: Lips, buccal mucosa, tongue: normal    MSK:Normal    Cardiovascular: peripheral edema none    Pulm: Breathing Normal    Lymph Nodes: No Head and Neck Lymphadenopathy     Neuro/Psych: Orientation:Alert and Orientedx3 ; Mood/Affect:normal       A/P:  1. Seborrheic keratosis, lentigo, angioma, dermal nevus, hx of non-melanoma skin cancer   2. L orbit actinic keratosis   LN2:  Treated with LN2 for 5s for 1-2  cycles. Warned risks of blistering, pain, pigment change, scarring, and incomplete resolution.  Advised patient to return if lesions do not completely resolve.  Wound care sheet given.  It was a pleasure speaking to Cornel Yuen today.  Previous clinic notes and pertinent laboratory tests were reviewed prior to Cornel Yuen's visit.  Signs and Symptoms of skin cancer discussed with patient.  Patient encouraged to perform monthly skin exams.  UV precautions reviewed with patient.  Return to clinic 12 months

## 2023-03-14 NOTE — LETTER
3/14/2023         RE: Cornel Yuen  [address]  [place] [address]        Dear Colleague,    Thank you for referring your patient, Cornel Yuen, to the [facility]. Please see a copy of my visit note below.    Cornel Yuen is an extremely pleasant 72 year old year old male patient here today for hx of non-melanoma skin cancer he denies any new or changing  Skin lesions.  Patient has no other skin complaints today.  Remainder of the HPI, Meds, PMH, Allergies, FH, and SH was reviewed in chart.      Past Medical History:   Diagnosis Date     Actinic keratosis      Basal cell carcinoma      Essential hypertension, benign      Squamous cell carcinoma        Past Surgical History:   Procedure Laterality Date     COLONOSCOPY N/A 12/14/2022    Procedure: COLONOSCOPY, FLEXIBLE, WITH LESION REMOVAL USING SNARE;  Surgeon: Timmy Rodrigues DO;  Location: [facility] REPAIR OF NASAL SEPTUM       HERNIA REPAIR, INGUINAL RT/LT       TONSILLECTOMY & ADENOIDECTOMY       ZZC ANESTH,KNEE JOINT; NOS          Family History   Problem Relation Age of Onset     Cancer Father         brain tumor     Diabetes Maternal Grandmother      Alzheimer Disease Maternal Grandmother      C.A.D. Maternal Grandfather      Alzheimer Disease Maternal Grandfather      Unknown/Adopted Paternal Grandmother      C.A.D. Paternal Grandfather      Neurologic Disorder Mother         memory issues     Alzheimer Disease Mother      Asthma Daughter      Melanoma No family hx of        Social History     Socioeconomic History     Marital status:      Spouse name: Not on file     Number of children: Not on file     Years of education: Not on file     Highest education level: Not on file   Occupational History     Not on file   Tobacco Use     Smoking status: Former     Smokeless tobacco: Never   Vaping Use     Vaping Use: Never used   Substance and Sexual Activity     Alcohol use: Yes     Comment: 2-3 scotch  per night     Drug use: Yes     Comment: occasional- marijuana     Sexual activity: Yes     Partners: Female     Birth control/protection: Surgical     Comment: vasectomy   Other Topics Concern     Parent/sibling w/ CABG, MI or angioplasty before 65F 55M? No   Social History Narrative     Not on file     Social Determinants of Health     Financial Resource Strain: Not on file   Food Insecurity: Not on file   Transportation Needs: Not on file   Physical Activity: Not on file   Stress: Not on file   Social Connections: Not on file   Intimate Partner Violence: Not on file   Housing Stability: Not on file       Outpatient Encounter Medications as of 3/14/2023   Medication Sig Dispense Refill     ASPIRIN 81 MG OR TABS 1 TABLET DAILY       atenolol (TENORMIN) 25 MG tablet Take 1 tablet (25 mg) by mouth daily 90 tablet 3     atorvastatin (LIPITOR) 10 MG tablet Take 1 tablet (10 mg) by mouth daily 90 tablet 3     GLUCOSAMINE-CHONDROITIN 750-600 MG OR TABS 1 tab daily 0 0     losartan (COZAAR) 25 MG tablet Take 1 tablet (25 mg) by mouth daily 90 tablet 3     No facility-administered encounter medications on file as of 3/14/2023.             O:   NAD, WDWN, Alert & Oriented, Mood & Affect wnl, Vitals stable   Here today alone    General appearance normal   Vitals stable   Alert, oriented and in no acute distress     L orbit gritty scaly papule    Stuck on papules and brown macules on trunk and ext   Red papules on trunk  Flesh colored papules on trunk     The remainder of the full exam was normal; the following areas were examined:  conjunctiva/lids, , neck, peripheral vascular system, abdomen, lymph nodes, digits/nails, eccrine and apocrine glands, scalp/hair, face, neck, chest, abdomen, buttocks, back, RUE, LUE, RLE, LLE       Eyes: Conjunctivae/lids:Normal     ENT: Lips, buccal mucosa, tongue: normal    MSK:Normal    Cardiovascular: peripheral edema none    Pulm: Breathing Normal    Lymph Nodes: No Head and Neck  Lymphadenopathy     Neuro/Psych: Orientation:Alert and Orientedx3 ; Mood/Affect:normal       A/P:  1. Seborrheic keratosis, lentigo, angioma, dermal nevus, hx of non-melanoma skin cancer   2. L orbit actinic keratosis   LN2:  Treated with LN2 for 5s for 1-2 cycles. Warned risks of blistering, pain, pigment change, scarring, and incomplete resolution.  Advised patient to return if lesions do not completely resolve.  Wound care sheet given.  It was a pleasure speaking to Cornel Yuen today.  Previous clinic notes and pertinent laboratory tests were reviewed prior to Cornel Yuen's visit.  Signs and Symptoms of skin cancer discussed with patient.  Patient encouraged to perform monthly skin exams.  UV precautions reviewed with patient.  Return to clinic 12 months        Again, thank you for allowing me to participate in the care of your patient.        Sincerely,        Edin De La Cruz MD

## 2023-03-30 ENCOUNTER — MYC MEDICAL ADVICE (OUTPATIENT)
Dept: FAMILY MEDICINE | Facility: CLINIC | Age: 73
End: 2023-03-30
Payer: COMMERCIAL

## 2023-03-30 DIAGNOSIS — U07.1 INFECTION DUE TO 2019 NOVEL CORONAVIRUS: Primary | ICD-10-CM

## 2023-03-30 NOTE — TELEPHONE ENCOUNTER
Reached out to patient via phone to discuss COVID-19 symptoms and treatment.    RN COVID TREATMENT VISIT  03/30/23    The patient has been triaged and does not require a higher level of care.    Cornel Yuen  72 year old  Current weight? 71.7 kg (158 lb)    Has the patient been seen by a primary care provider at an [facility] or [facility] within the past two years? Yes.   Have you been in close proximity to/do you have a known exposure to a person with a confirmed case of influenza? No.     General treatment eligibility:  Date of positive COVID test (PCR or at home)?  3/30/23    Are you or have you been hospitalized for this COVID-19 infection? No.   Have you received monoclonal antibodies or antiviral treatment for COVID-19 since this positive test? No.   Do you have any of the following conditions that place you at risk of being very sick from COVID-19?   - Age 50 years or older  Yes, patient has at least one high risk condition as noted above.     Current COVID symptoms:   - cough  - fatigue  - sore throat  - congestion or runny nose  Yes. Patient has at least one symptom as selected.     How many days since symptoms started? 5 days or less. Established patient, 12 years or older weighing at least 88.2 lbs, who has symptoms that started in the past 5 days, has not been hospitalized nor received treatment already, and is at risk for being very sick from COVID-19.     Treatment eligibility by RN:    Are you currently pregnant or nursing? No    Do you have a clinically significant hypersensitivity to nirmatrelvir or ritonavir, or toxic epidermal necrolysis (TEN) or Yañez-Paulino Syndrome? No    Do you have a history of hepatitis, any hepatic impairment on the Problem List (such as Child-Armenta Class C, cirrhosis, fatty liver disease, alcoholic liver disease), or was the last liver lab (hepatic panel, ALT, AST, ALK Phos, bilirubin) elevated in the past 6 months? No    Do you have any  history of severe renal impairment (eGFR < 30mL/min)? No    Is patient eligible to continue?   Yes, patient meets all eligibility requirements for the RN COVID treatment (as denoted by all no responses above).     Current Outpatient Medications   Medication Sig Dispense Refill     ASPIRIN 81 MG OR TABS 1 TABLET DAILY       atenolol (TENORMIN) 25 MG tablet Take 1 tablet (25 mg) by mouth daily 90 tablet 3     atorvastatin (LIPITOR) 10 MG tablet Take 1 tablet (10 mg) by mouth daily 90 tablet 3     GLUCOSAMINE-CHONDROITIN 750-600 MG OR TABS 1 tab daily 0 0     losartan (COZAAR) 25 MG tablet Take 1 tablet (25 mg) by mouth daily 90 tablet 3       Medications from List 1 of the standing order (on medications that exclude the use of Paxlovid) that patient is taking: NONE. Is patient taking Amory's Wort? No  Is patient taking Amory's Wort or any meds from List 1? No.   Medications from List 2 of the standing order (on meds that provider needs to adjust) that patient is taking: NONE. Is patient on any of the meds from List 2? No.   Medications from List 3 of standing order (on meds that a RN needs to adjust) that patient is taking: atorvastatin (Lipitor): Instructed patient to stop atorvastatin while taking Paxlovid and restart atorvastatin 1 day after the completion of Paxlovid.  Is patient on any meds from List 3? Yes. Patient is on meds from list 3. No meds require a provider visit and at least one med required RN to adjust.     Paxlovid has an approximate 90% reduction in hospitalization. Paxlovid can possibly cause altered sense of taste, diarrhea (loose, watery stools), high blood pressure, muscle aches.     Would patient like a Paxlovid prescription?   Yes.   Lab Results   Component Value Date    GFRESTIMATED 85 12/28/2022       Was last eGFR reduced? No, eGFR 60 or greater/ No Result on record. Patient can receive the normal renal function dose. Paxlovid Rx sent to [facility]    [phone]    [address]  [place] [address]    Hours:  Mon-Fri: 8:00a - 9:00p  Sat-Sun: 9:00a - 5:00p        Temporary change to home medications: Hold atorvastatin as directed until 1 day after completion of Paxlovid.     All medication adjustments (holds, etc) were discussed with the patient and patient was asked to repeat back (teachback) their med adjustment.  Did patient understand med adjustment? Yes, patient repeated back and understood correctly.      Reviewed the following instructions with the patient:    Paxlovid (nimatrelvir and ritonavir)    How it works  Two medicines (nirmatrelvir and ritonavir) are taken together. They stop the virus from growing. Less amount of virus is easier for your body to fight.    How to take    Medicine comes in a daily container with both medicine tablets. Take by mouth twice daily (once in the morning, once at night) for 5 days.    The number of tablets to take varies by patient.    Don't chew or break capsules. Swallow whole.    When to take  Take as soon as possible after positive COVID-19 test result, and within 5 days of your first symptoms.    Possible side effects  Can cause altered sense of taste, diarrhea (loose, watery stools), high blood pressure, muscle aches.    SHANI OSORIO, RN

## 2023-12-15 ENCOUNTER — OFFICE VISIT (OUTPATIENT)
Dept: FAMILY MEDICINE | Facility: CLINIC | Age: 73
End: 2023-12-15
Payer: COMMERCIAL

## 2023-12-15 VITALS
SYSTOLIC BLOOD PRESSURE: 160 MMHG | RESPIRATION RATE: 18 BRPM | BODY MASS INDEX: 22.93 KG/M2 | HEIGHT: 69 IN | OXYGEN SATURATION: 99 % | TEMPERATURE: 98.2 F | WEIGHT: 154.8 LBS | DIASTOLIC BLOOD PRESSURE: 84 MMHG | HEART RATE: 74 BPM

## 2023-12-15 DIAGNOSIS — E78.5 HYPERLIPIDEMIA LDL GOAL <100: ICD-10-CM

## 2023-12-15 DIAGNOSIS — I10 ESSENTIAL HYPERTENSION, BENIGN: ICD-10-CM

## 2023-12-15 DIAGNOSIS — Z13.6 ENCOUNTER FOR ABDOMINAL AORTIC ANEURYSM (AAA) SCREENING: ICD-10-CM

## 2023-12-15 DIAGNOSIS — Z29.11 NEED FOR VACCINATION AGAINST RESPIRATORY SYNCYTIAL VIRUS: ICD-10-CM

## 2023-12-15 DIAGNOSIS — Z00.00 ENCOUNTER FOR MEDICARE ANNUAL WELLNESS EXAM: Primary | ICD-10-CM

## 2023-12-15 LAB
ANION GAP SERPL CALCULATED.3IONS-SCNC: 12 MMOL/L (ref 7–15)
BUN SERPL-MCNC: 19.7 MG/DL (ref 8–23)
CALCIUM SERPL-MCNC: 9.5 MG/DL (ref 8.8–10.2)
CHLORIDE SERPL-SCNC: 103 MMOL/L (ref 98–107)
CHOLEST SERPL-MCNC: 181 MG/DL
CREAT SERPL-MCNC: 0.89 MG/DL (ref 0.67–1.17)
DEPRECATED HCO3 PLAS-SCNC: 23 MMOL/L (ref 22–29)
EGFRCR SERPLBLD CKD-EPI 2021: >90 ML/MIN/1.73M2
FASTING STATUS PATIENT QL REPORTED: YES
GLUCOSE SERPL-MCNC: 95 MG/DL (ref 70–99)
HDLC SERPL-MCNC: 76 MG/DL
LDLC SERPL CALC-MCNC: 85 MG/DL
NONHDLC SERPL-MCNC: 105 MG/DL
POTASSIUM SERPL-SCNC: 4.4 MMOL/L (ref 3.4–5.3)
SODIUM SERPL-SCNC: 138 MMOL/L (ref 135–145)
TRIGL SERPL-MCNC: 99 MG/DL

## 2023-12-15 PROCEDURE — 36415 COLL VENOUS BLD VENIPUNCTURE: CPT | Performed by: STUDENT IN AN ORGANIZED HEALTH CARE EDUCATION/TRAINING PROGRAM

## 2023-12-15 PROCEDURE — 80048 BASIC METABOLIC PNL TOTAL CA: CPT | Performed by: STUDENT IN AN ORGANIZED HEALTH CARE EDUCATION/TRAINING PROGRAM

## 2023-12-15 PROCEDURE — 99214 OFFICE O/P EST MOD 30 MIN: CPT | Mod: 25 | Performed by: STUDENT IN AN ORGANIZED HEALTH CARE EDUCATION/TRAINING PROGRAM

## 2023-12-15 PROCEDURE — 80061 LIPID PANEL: CPT | Performed by: STUDENT IN AN ORGANIZED HEALTH CARE EDUCATION/TRAINING PROGRAM

## 2023-12-15 PROCEDURE — G0439 PPPS, SUBSEQ VISIT: HCPCS | Performed by: STUDENT IN AN ORGANIZED HEALTH CARE EDUCATION/TRAINING PROGRAM

## 2023-12-15 RX ORDER — LOSARTAN POTASSIUM 25 MG/1
25 TABLET ORAL DAILY
Qty: 90 TABLET | Refills: 3 | Status: SHIPPED | OUTPATIENT
Start: 2023-12-15

## 2023-12-15 RX ORDER — ATORVASTATIN CALCIUM 10 MG/1
10 TABLET, FILM COATED ORAL DAILY
Qty: 90 TABLET | Refills: 3 | Status: SHIPPED | OUTPATIENT
Start: 2023-12-15

## 2023-12-15 RX ORDER — ATENOLOL 25 MG/1
25 TABLET ORAL DAILY
Qty: 90 TABLET | Refills: 3 | Status: SHIPPED | OUTPATIENT
Start: 2023-12-15

## 2023-12-15 RX ORDER — RESPIRATORY SYNCYTIAL VIRUS VACCINE 120MCG/0.5
0.5 KIT INTRAMUSCULAR ONCE
Qty: 1 EACH | Refills: 0 | Status: CANCELLED | OUTPATIENT
Start: 2023-12-15 | End: 2023-12-15

## 2023-12-15 ASSESSMENT — ENCOUNTER SYMPTOMS
DIZZINESS: 0
NAUSEA: 0
CONSTIPATION: 0
HEMATOCHEZIA: 0
JOINT SWELLING: 1
ABDOMINAL PAIN: 0
SORE THROAT: 0
FEVER: 0
NERVOUS/ANXIOUS: 0
EYE PAIN: 0
SHORTNESS OF BREATH: 0
ARTHRALGIAS: 1
PARESTHESIAS: 0
FREQUENCY: 0
WEAKNESS: 0
PALPITATIONS: 0
COUGH: 0
HEMATURIA: 0
MYALGIAS: 0
HEADACHES: 0
DIARRHEA: 0
DYSURIA: 0
CHILLS: 0

## 2023-12-15 ASSESSMENT — ACTIVITIES OF DAILY LIVING (ADL): CURRENT_FUNCTION: NO ASSISTANCE NEEDED

## 2023-12-15 ASSESSMENT — PAIN SCALES - GENERAL: PAINLEVEL: NO PAIN (0)

## 2023-12-15 NOTE — PATIENT INSTRUCTIONS
"Hello! It was a pleasure seeing you today. Just some things we discussed at today's visit:     Vaccines    You are due for a RSV vaccine, please check to see if that is covered by your insurance, if it is you can make an appointment with our clinic for a immunization nurse visit OR you can see if your local retail pharmacy has has RSV vaccines available   Abdominal Aorta screening   Please call [phone] to schedule an appointment for your ultrasound   Elevated blood pressure   Try to limit your dietary intake of fatty, greasy, oily, fried, take out, and fast food when possible. Also, I would suggest you cut back on red and processed meats, sodium and sugar-sweetened foods and beverages.  Regarding exercise, please get at least 30 minutes of CONTINUOUS moderate intensity aerobic exercise at least 3 times per week.      Sincerely,     Sasha Fragoso MD        Patient Education   Personalized Prevention Plan  You are due for the preventive services outlined below.  Your care team is available to assist you in scheduling these services.  If you have already completed any of these items, please share that information with your care team to update in your medical record.  Health Maintenance Due   Topic Date Due    LUNG CANCER SCREENING  Never done    RSV VACCINE (Pregnancy & 60+) (1 - 1-dose 60+ series) Never done    Zoster (Shingles) Vaccine (2 of 3) 07/04/2017    AORTIC ANEURYSM SCREENING (SYSTEM ASSIGNED)  Never done    Annual Wellness Visit  12/30/2023    ANNUAL REVIEW OF HM ORDERS  12/30/2023     Learning About Being Physically Active  What is physical activity?     Being physically active means doing any kind of activity that gets your body moving.  The types of physical activity that can help you get fit and stay healthy include:  Aerobic or \"cardio\" activities. These make your heart beat faster and make you breathe harder, such as brisk walking, riding a bike, or running. They strengthen your heart and lungs " "and build up your endurance.  Strength training activities. These make your muscles work against, or \"resist,\" something. Examples include lifting weights or doing push-ups. These activities help tone and strengthen your muscles and bones.  Stretches. These let you move your joints and muscles through their full range of motion. Stretching helps you be more flexible.  Reaching a balance between these three types of physical activity is important because each one contributes to your overall fitness.  What are the benefits of being active?  Being active is one of the best things you can do for your health. It helps you to:  Feel stronger and have more energy to do all the things you like to do.  Focus better at school or work.  Feel, think, and sleep better.  Reach and stay at a healthy weight.  Lose fat and build lean muscle.  Lower your risk for serious health problems, including diabetes, heart attack, high blood pressure, and some cancers.  Keep your heart, lungs, bones, muscles, and joints strong and healthy.  How can you make being active part of your life?  Start slowly. Make it your long-term goal to get at least 30 minutes of exercise on most days of the week. Walking is a good choice. You also may want to do other activities, such as running, swimming, cycling, or playing tennis or team sports.  Pick activities that you like--ones that make your heart beat faster, your muscles stronger, and your muscles and joints more flexible. If you find more than one thing you like doing, do them all. You don't have to do the same thing every day.  Get your heart pumping every day. Any activity that makes your heart beat faster and keeps it at that rate for a while counts.  Here are some great ways to get your heart beating faster:  Go for a brisk walk, run, or hike.  Go for a swim or bike ride.  Take an online exercise class or dance.  Play a game of touch football, basketball, or soccer.  Play tennis, pickleball, or " "racquetball.  Climb stairs.  Even some household chores can be aerobic. Just do them at a faster pace. Raking or mowing the lawn, sweeping the garage, and vacuuming and cleaning your home all can help get your heart rate up.  Strengthen your muscles during the week. You don't have to lift heavy weights or grow big, bulky muscles to get stronger. Doing a few simple activities that make your muscles work against, or \"resist,\" something can help you get stronger. Aim for at least twice a week.  For example, you can:  Do push-ups or sit-ups, which use your own body weight as resistance.  Lift weights or dumbbells or use stretch bands at home or in a gym or community center.  Stretch your muscles often. Stretching will help you as you become more active. It can help you stay flexible and loosen tight muscles. It can also help improve your balance and posture and can be a great way to relax.  Be sure to stretch the muscles you'll be using when you work out. It's best to warm your muscles slightly before you stretch them. Walk or do some other light aerobic activity for a few minutes. Then start stretching.  When you stretch your muscles:  Do it slowly. Stretching is not about going fast or making sudden movements.  Don't push or bounce during a stretch.  Hold each stretch for at least 15 to 30 seconds, if you can. You should feel a stretch in the muscle, but not pain.  Breathe out as you do the stretch. Then breathe in as you hold the stretch. Don't hold your breath.  If you're worried about how more activity might affect your health, have a checkup before you start. Follow any special advice your doctor gives you for getting a smart start.  Where can you learn more?  Go to https://www.[employer].net/patiented  Enter W332 in the search box to learn more about \"Learning About Being Physically Active.\"  Current as of: June 6, 2023               Content Version: 13.8    4917-2031 "[employer].   Care instructions " adapted under license by your healthcare professional. If you have questions about a medical condition or this instruction, always ask your healthcare professional. [employer], [employer] disclaims any warranty or liability for your use of this information.      Hearing Loss: Care Instructions  Overview     Hearing loss is a sudden or slow decrease in how well you hear. It can range from slight to profound. Permanent hearing loss can occur with aging. It also can happen when you are exposed long-term to loud noise. Examples include listening to loud music, riding motorcycles, or being around other loud machines.  Hearing loss can affect your work and home life. It can make you feel lonely or depressed. You may feel that you have lost your independence. But hearing aids and other devices can help you hear better and feel connected to others.  Follow-up care is a key part of your treatment and safety. Be sure to make and go to all appointments, and call your doctor if you are having problems. It's also a good idea to know your test results and keep a list of the medicines you take.  How can you care for yourself at home?  Avoid loud noises whenever possible. This helps keep your hearing from getting worse.  Always wear hearing protection around loud noises.  Wear a hearing aid as directed.  A professional can help you pick a hearing aid that will work best for you.  You can also get hearing aids over the counter for mild to moderate hearing loss.  Have hearing tests as your doctor suggests. They can show whether your hearing has changed. Your hearing aid may need to be adjusted.  Use other devices as needed. These may include:  Telephone amplifiers and hearing aids that can connect to a television, stereo, radio, or microphone.  Devices that use lights or vibrations. These alert you to the doorbell, a ringing telephone, or a baby monitor.  Television closed-captioning. This shows the words at the bottom of the  "screen. Most new TVs can do this.  TTY (text telephone). This lets you type messages back and forth on the telephone instead of talking or listening. These devices are also called TDD. When messages are typed on the keyboard, they are sent over the phone line to a receiving TTY. The message is shown on a monitor.  Use text messaging, social media, and email if it is hard for you to communicate by telephone.  Try to learn a listening technique called speechreading. It is not lipreading. You pay attention to people's gestures, expressions, posture, and tone of voice. These clues can help you understand what a person is saying. Face the person you are talking to, and have them face you. Make sure the lighting is good. You need to see the other person's face clearly.  Think about counseling if you need help to adjust to your hearing loss.  When should you call for help?  Watch closely for changes in your health, and be sure to contact your doctor if:    You think your hearing is getting worse.     You have new symptoms, such as dizziness or nausea.   Where can you learn more?  Go to https://www.[employer].net/patiented  Enter R798 in the search box to learn more about \"Hearing Loss: Care Instructions.\"  Current as of: February 28, 2023               Content Version: 13.8    6447-3903 [employer].   Care instructions adapted under license by your healthcare professional. If you have questions about a medical condition or this instruction, always ask your healthcare professional. [employer] disclaims any warranty or liability for your use of this information.         "

## 2024-01-08 ENCOUNTER — HOSPITAL ENCOUNTER (OUTPATIENT)
Dept: ULTRASOUND IMAGING | Facility: CLINIC | Age: 74
Discharge: HOME | End: 2024-01-08
Attending: STUDENT IN AN ORGANIZED HEALTH CARE EDUCATION/TRAINING PROGRAM | Admitting: STUDENT IN AN ORGANIZED HEALTH CARE EDUCATION/TRAINING PROGRAM
Payer: COMMERCIAL

## 2024-01-08 DIAGNOSIS — Z13.6 ENCOUNTER FOR ABDOMINAL AORTIC ANEURYSM (AAA) SCREENING: ICD-10-CM

## 2024-01-08 PROCEDURE — 76775 US EXAM ABDO BACK WALL LIM: CPT

## 2024-03-27 ENCOUNTER — OFFICE VISIT (OUTPATIENT)
Dept: DERMATOLOGY | Facility: CLINIC | Age: 74
End: 2024-03-27
Payer: COMMERCIAL

## 2024-03-27 DIAGNOSIS — D18.01 ANGIOMA OF SKIN: ICD-10-CM

## 2024-03-27 DIAGNOSIS — Z85.828 HISTORY OF SKIN CANCER: ICD-10-CM

## 2024-03-27 DIAGNOSIS — L81.4 LENTIGO: ICD-10-CM

## 2024-03-27 DIAGNOSIS — L82.1 SEBORRHEIC KERATOSES: ICD-10-CM

## 2024-03-27 DIAGNOSIS — D23.9 DERMAL NEVUS: Primary | ICD-10-CM

## 2024-03-27 DIAGNOSIS — L57.0 AK (ACTINIC KERATOSIS): ICD-10-CM

## 2024-03-27 PROCEDURE — 17003 DESTRUCT PREMALG LES 2-14: CPT | Performed by: DERMATOLOGY

## 2024-03-27 PROCEDURE — 17000 DESTRUCT PREMALG LESION: CPT | Performed by: DERMATOLOGY

## 2024-03-27 PROCEDURE — 99213 OFFICE O/P EST LOW 20 MIN: CPT | Mod: 25 | Performed by: DERMATOLOGY

## 2024-03-27 NOTE — LETTER
3/27/2024         RE: Cornel Yuen  [address]  [place] [address]        Dear Colleague,    Thank you for referring your patient, Cornel Yuen, to the [facility]. Please see a copy of my visit note below.    Cornel Yuen is an extremely pleasant 73 year old year old male patient here today for hx of non-melanoma skin cancer.  Patient has no other skin complaints today.  Remainder of the HPI, Meds, PMH, Allergies, FH, and SH was reviewed in chart.      Past Medical History:   Diagnosis Date     Actinic keratosis      Basal cell carcinoma      Essential hypertension, benign      Squamous cell carcinoma        Past Surgical History:   Procedure Laterality Date     COLONOSCOPY N/A 12/14/2022    Procedure: COLONOSCOPY, FLEXIBLE, WITH LESION REMOVAL USING SNARE;  Surgeon: Timmy Rodrigues DO;  Location: [facility] REPAIR OF NASAL SEPTUM       HERNIA REPAIR, INGUINAL RT/LT       TONSILLECTOMY & ADENOIDECTOMY       ZZC ANESTH,KNEE JOINT; NOS          Family History   Problem Relation Age of Onset     Cancer Father         brain tumor     Diabetes Maternal Grandmother      Alzheimer Disease Maternal Grandmother      C.A.D. Maternal Grandfather      Alzheimer Disease Maternal Grandfather      Unknown/Adopted Paternal Grandmother      C.A.D. Paternal Grandfather      Neurologic Disorder Mother         memory issues     Alzheimer Disease Mother      Asthma Daughter      Melanoma No family hx of        Social History     Socioeconomic History     Marital status:      Spouse name: Not on file     Number of children: Not on file     Years of education: Not on file     Highest education level: Not on file   Occupational History     Not on file   Tobacco Use     Smoking status: Former     Smokeless tobacco: Never   Vaping Use     Vaping Use: Never used   Substance and Sexual Activity     Alcohol use: Yes     Comment: 2-3 scotch per night     Drug use: Yes     Comment:  occasional- marijuana     Sexual activity: Yes     Partners: Female     Birth control/protection: Surgical     Comment: vasectomy   Other Topics Concern     Parent/sibling w/ CABG, MI or angioplasty before 65F 55M? No   Social History Narrative     Not on file     Social Determinants of Health     Financial Resource Strain: Low Risk  (12/15/2023)    Financial Resource Strain      Within the past 12 months, have you or your family members you live with been unable to get utilities (heat, electricity) when it was really needed?: No   Food Insecurity: Low Risk  (12/15/2023)    Food Insecurity      Within the past 12 months, did you worry that your food would run out before you got money to buy more?: No      Within the past 12 months, did the food you bought just not last and you didn t have money to get more?: No   Transportation Needs: Low Risk  (12/15/2023)    Transportation Needs      Within the past 12 months, has lack of transportation kept you from medical appointments, getting your medicines, non-medical meetings or appointments, work, or from getting things that you need?: No   Physical Activity: Not on file   Stress: Not on file   Social Connections: Not on file   Interpersonal Safety: Low Risk  (12/15/2023)    Interpersonal Safety      Do you feel physically and emotionally safe where you currently live?: Yes      Within the past 12 months, have you been hit, slapped, kicked or otherwise physically hurt by someone?: No      Within the past 12 months, have you been humiliated or emotionally abused in other ways by your partner or ex-partner?: No   Housing Stability: Low Risk  (12/15/2023)    Housing Stability      Do you have housing? : Yes      Are you worried about losing your housing?: No       Outpatient Encounter Medications as of 3/27/2024   Medication Sig Dispense Refill     ASPIRIN 81 MG OR TABS 1 TABLET DAILY       atenolol (TENORMIN) 25 MG tablet Take 1 tablet (25 mg) by mouth daily 90 tablet 3      atorvastatin (LIPITOR) 10 MG tablet Take 1 tablet (10 mg) by mouth daily 90 tablet 3     GLUCOSAMINE-CHONDROITIN 750-600 MG OR TABS 1 tab daily 0 0     losartan (COZAAR) 25 MG tablet Take 1 tablet (25 mg) by mouth daily 90 tablet 3     No facility-administered encounter medications on file as of 3/27/2024.             O:   NAD, WDWN, Alert & Oriented, Mood & Affect wnl, Vitals stable   General appearance normal   Vitals stable   Alert, oriented and in no acute distress     Gritty scaly papule on hands    Stuck on papules and brown macules on trunk and ext   Red papules on trunk  Flesh colored papules on trunk     The remainder of the full exam was normal; the following areas were examined:  conjunctiva/lids, , neck, peripheral vascular system, abdomen, lymph nodes, digits/nails, eccrine and apocrine glands, scalp/hair, face, neck, chest, abdomen, buttocks, back, RUE, LUE, RLE, LLE       Eyes: Conjunctivae/lids:Normal     ENT: Lips, buccal mucosa, tongue: normal    MSK:Normal    Cardiovascular: peripheral edema none    Pulm: Breathing Normal    Lymph Nodes: No Head and Neck Lymphadenopathy     Neuro/Psych: Orientation:Alert and Orientedx3 ; Mood/Affect:normal       A/P:  1. Seborrheic keratosis, lentigo, angioma, dermal nevus, hx of non-melanoma skin cancer   2. Actinic keratosis   R hand x3  L ahnd x4  LN2:  Treated with LN2 for 5s for 1-2 cycles. Warned risks of blistering, pain, pigment change, scarring, and incomplete resolution.  Advised patient to return if lesions do not completely resolve.  Wound care sheet given.  It was a pleasure speaking to Cornel Yuen today.  Previous clinic notes and pertinent laboratory tests were reviewed prior to Cornel Yuen's visit.  Signs and Symptoms of skin cancer discussed with patient.  Patient encouraged to perform monthly skin exams.  UV precautions reviewed with patient.  Risks of non-melanoma skin cancer discussed with patient   Return to clinic 1  2months      Again, thank you for allowing me to participate in the care of your patient.        Sincerely,        Edin De La Cruz MD

## 2024-03-27 NOTE — PROGRESS NOTES
Cornel Yuen is an extremely pleasant 73 year old year old male patient here today for hx of non-melanoma skin cancer.  Patient has no other skin complaints today.  Remainder of the HPI, Meds, PMH, Allergies, FH, and SH was reviewed in chart.      Past Medical History:   Diagnosis Date    Actinic keratosis     Basal cell carcinoma     Essential hypertension, benign     Squamous cell carcinoma        Past Surgical History:   Procedure Laterality Date    COLONOSCOPY N/A 12/14/2022    Procedure: COLONOSCOPY, FLEXIBLE, WITH LESION REMOVAL USING SNARE;  Surgeon: Timmy Rodrigues DO;  Location: [facility]    HC REPAIR OF NASAL SEPTUM      HERNIA REPAIR, INGUINAL RT/LT      TONSILLECTOMY & ADENOIDECTOMY      ZZC ANESTH,KNEE JOINT; NOS          Family History   Problem Relation Age of Onset    Cancer Father         brain tumor    Diabetes Maternal Grandmother     Alzheimer Disease Maternal Grandmother     C.A.D. Maternal Grandfather     Alzheimer Disease Maternal Grandfather     Unknown/Adopted Paternal Grandmother     C.A.D. Paternal Grandfather     Neurologic Disorder Mother         memory issues    Alzheimer Disease Mother     Asthma Daughter     Melanoma No family hx of        Social History     Socioeconomic History    Marital status:      Spouse name: Not on file    Number of children: Not on file    Years of education: Not on file    Highest education level: Not on file   Occupational History    Not on file   Tobacco Use    Smoking status: Former    Smokeless tobacco: Never   Vaping Use    Vaping Use: Never used   Substance and Sexual Activity    Alcohol use: Yes     Comment: 2-3 scotch per night    Drug use: Yes     Comment: occasional- marijuana    Sexual activity: Yes     Partners: Female     Birth control/protection: Surgical     Comment: vasectomy   Other Topics Concern    Parent/sibling w/ CABG, MI or angioplasty before 65F 55M? No   Social History Narrative    Not on file     Social Determinants  of Health     Financial Resource Strain: Low Risk  (12/15/2023)    Financial Resource Strain     Within the past 12 months, have you or your family members you live with been unable to get utilities (heat, electricity) when it was really needed?: No   Food Insecurity: Low Risk  (12/15/2023)    Food Insecurity     Within the past 12 months, did you worry that your food would run out before you got money to buy more?: No     Within the past 12 months, did the food you bought just not last and you didn t have money to get more?: No   Transportation Needs: Low Risk  (12/15/2023)    Transportation Needs     Within the past 12 months, has lack of transportation kept you from medical appointments, getting your medicines, non-medical meetings or appointments, work, or from getting things that you need?: No   Physical Activity: Not on file   Stress: Not on file   Social Connections: Not on file   Interpersonal Safety: Low Risk  (12/15/2023)    Interpersonal Safety     Do you feel physically and emotionally safe where you currently live?: Yes     Within the past 12 months, have you been hit, slapped, kicked or otherwise physically hurt by someone?: No     Within the past 12 months, have you been humiliated or emotionally abused in other ways by your partner or ex-partner?: No   Housing Stability: Low Risk  (12/15/2023)    Housing Stability     Do you have housing? : Yes     Are you worried about losing your housing?: No       Outpatient Encounter Medications as of 3/27/2024   Medication Sig Dispense Refill    ASPIRIN 81 MG OR TABS 1 TABLET DAILY      atenolol (TENORMIN) 25 MG tablet Take 1 tablet (25 mg) by mouth daily 90 tablet 3    atorvastatin (LIPITOR) 10 MG tablet Take 1 tablet (10 mg) by mouth daily 90 tablet 3    GLUCOSAMINE-CHONDROITIN 750-600 MG OR TABS 1 tab daily 0 0    losartan (COZAAR) 25 MG tablet Take 1 tablet (25 mg) by mouth daily 90 tablet 3     No facility-administered encounter medications on file as of  3/27/2024.             O:   NAD, WDWN, Alert & Oriented, Mood & Affect wnl, Vitals stable   General appearance normal   Vitals stable   Alert, oriented and in no acute distress     Gritty scaly papule on hands    Stuck on papules and brown macules on trunk and ext   Red papules on trunk  Flesh colored papules on trunk     The remainder of the full exam was normal; the following areas were examined:  conjunctiva/lids, , neck, peripheral vascular system, abdomen, lymph nodes, digits/nails, eccrine and apocrine glands, scalp/hair, face, neck, chest, abdomen, buttocks, back, RUE, LUE, RLE, LLE       Eyes: Conjunctivae/lids:Normal     ENT: Lips, buccal mucosa, tongue: normal    MSK:Normal    Cardiovascular: peripheral edema none    Pulm: Breathing Normal    Lymph Nodes: No Head and Neck Lymphadenopathy     Neuro/Psych: Orientation:Alert and Orientedx3 ; Mood/Affect:normal       A/P:  1. Seborrheic keratosis, lentigo, angioma, dermal nevus, hx of non-melanoma skin cancer   2. Actinic keratosis   R hand x3  L ahnd x4  LN2:  Treated with LN2 for 5s for 1-2 cycles. Warned risks of blistering, pain, pigment change, scarring, and incomplete resolution.  Advised patient to return if lesions do not completely resolve.  Wound care sheet given.  It was a pleasure speaking to Cornel Yuen today.  Previous clinic notes and pertinent laboratory tests were reviewed prior to Cornel Yuen's visit.  Signs and Symptoms of skin cancer discussed with patient.  Patient encouraged to perform monthly skin exams.  UV precautions reviewed with patient.  Risks of non-melanoma skin cancer discussed with patient   Return to clinic 1 2months

## 2024-12-12 ENCOUNTER — OFFICE VISIT (OUTPATIENT)
Dept: FAMILY MEDICINE | Facility: CLINIC | Age: 74
End: 2024-12-12
Payer: COMMERCIAL

## 2024-12-12 VITALS
RESPIRATION RATE: 20 BRPM | BODY MASS INDEX: 22.32 KG/M2 | DIASTOLIC BLOOD PRESSURE: 74 MMHG | OXYGEN SATURATION: 99 % | TEMPERATURE: 98.1 F | HEART RATE: 68 BPM | SYSTOLIC BLOOD PRESSURE: 138 MMHG | WEIGHT: 150.7 LBS | HEIGHT: 69 IN

## 2024-12-12 DIAGNOSIS — Z23 NEED FOR SHINGLES VACCINE: ICD-10-CM

## 2024-12-12 DIAGNOSIS — E78.5 HYPERLIPIDEMIA LDL GOAL <130: ICD-10-CM

## 2024-12-12 DIAGNOSIS — M25.561 CHRONIC PAIN OF BOTH KNEES: ICD-10-CM

## 2024-12-12 DIAGNOSIS — G89.29 CHRONIC PAIN OF BOTH KNEES: ICD-10-CM

## 2024-12-12 DIAGNOSIS — M25.562 CHRONIC PAIN OF BOTH KNEES: ICD-10-CM

## 2024-12-12 DIAGNOSIS — Z12.5 SCREENING FOR PROSTATE CANCER: ICD-10-CM

## 2024-12-12 DIAGNOSIS — Z23 NEED FOR VACCINATION: ICD-10-CM

## 2024-12-12 DIAGNOSIS — I10 ESSENTIAL HYPERTENSION, BENIGN: ICD-10-CM

## 2024-12-12 DIAGNOSIS — Z00.00 ENCOUNTER FOR MEDICARE ANNUAL WELLNESS EXAM: Primary | ICD-10-CM

## 2024-12-12 RX ORDER — ATORVASTATIN CALCIUM 10 MG/1
10 TABLET, FILM COATED ORAL DAILY
Qty: 90 TABLET | Refills: 3 | Status: SHIPPED | OUTPATIENT
Start: 2024-12-12

## 2024-12-12 RX ORDER — LOSARTAN POTASSIUM 25 MG/1
25 TABLET ORAL DAILY
Qty: 90 TABLET | Refills: 3 | Status: SHIPPED | OUTPATIENT
Start: 2024-12-12

## 2024-12-12 RX ORDER — ATENOLOL 25 MG/1
25 TABLET ORAL DAILY
Qty: 90 TABLET | Refills: 3 | Status: SHIPPED | OUTPATIENT
Start: 2024-12-12

## 2024-12-12 RX ORDER — MAGNESIUM CARB/ALUMINUM HYDROX 105-160MG
1 TABLET,CHEWABLE ORAL DAILY
Qty: 90 TABLET | Refills: 3 | Status: SHIPPED | OUTPATIENT
Start: 2024-12-12

## 2024-12-12 SDOH — HEALTH STABILITY: PHYSICAL HEALTH: ON AVERAGE, HOW MANY DAYS PER WEEK DO YOU ENGAGE IN MODERATE TO STRENUOUS EXERCISE (LIKE A BRISK WALK)?: 3 DAYS

## 2024-12-12 ASSESSMENT — PAIN SCALES - GENERAL: PAINLEVEL_OUTOF10: MILD PAIN (2)

## 2024-12-12 ASSESSMENT — SOCIAL DETERMINANTS OF HEALTH (SDOH): HOW OFTEN DO YOU GET TOGETHER WITH FRIENDS OR RELATIVES?: TWICE A WEEK

## 2024-12-12 NOTE — PROGRESS NOTES
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  - refilled losartan (COZAAR) 25 MG tablet; Take 1 tablet (25 mg) by mouth daily.    Hyperlipidemia LDL goal <130  - Lipid panel reflex to direct LDL Fasting; Future  - refilled atorvastatin (LIPITOR) 10 MG tablet; Take 1 tablet (10 mg) by mouth daily.    Chronic pain of both knees  - refilled glucosamine-chondroitinoitin 750-600 MG TABS; Take 1 tablet by mouth daily.    Patient has been advised of split billing requirements and indicates understanding: Yes      Subjective   Kayden is a 74 year old, presenting for the following:  Physical (Not fasting for labs today), Recheck Medication, Hypertension, Lipids, and Health Maintenance (Covid vaccine today, advised to get shingles vaccine at his pharmacy)        12/12/2024     9:15 AM   Additional Questions   Roomed by Bethany RUIZ LPN   Accompanied by self         12/12/2024     9:15 AM   Patient Reported Additional Medications   Patient reports taking the following new medications no new meds     HPI    Hyperlipidemia Follow-Up    Are you regularly taking any medication or supplement to lower your cholesterol?   Yes- atorvastatin 10 mg daily  Are you having muscle aches or other side effects that you think could be caused by your cholesterol lowering medication?  No    Hypertension Follow-up    Do you check your blood pressure regularly outside of the clinic? Yes    Are you following a low salt diet? Yes  Are your blood pressures ever more than 140 on the top number (systolic) OR more   than 90 on the bottom number (diastolic), for example 140/90? No  Medication Followup of Atenolol 25 mg daily  Taking Medication as prescribed: yes  Side Effects:  None  Medication Helping Symptoms:  yes  Medication Followup of glucosamine - chondroitin 750-600 mg daily  Taking Medication as prescribed: yes  Side Effects:  None  Medication Helping Symptoms:  yes     Medication Followup of Losartan 25 mg daily  Taking Medication as prescribed: yes  Side Effects:  None  Medication Helping Symptoms:  yes      Health Care Directive  Patient does not have a Health Care Directive: Patient states has Advance Directive and will bring in a copy to clinic.      12/12/2024   General Health   How would you rate your overall physical health? Good   Feel stress (tense, anxious, or unable to sleep) Not at all            12/12/2024   Nutrition   Diet: Regular (no restrictions)            12/12/2024   Exercise   Days per week of moderate/strenous exercise 3 days            12/12/2024   Social Factors   Frequency of gathering with friends or relatives Twice a week   Worry food won't last until get money to buy more No   Food not last or not have enough money for food? No   Do you have housing? (Housing is defined as stable permanent housing and does not include staying ouside in a car, in a tent, in an abandoned building, in an overnight shelter, or couch-surfing.) Yes   Are you worried about losing your housing? No   Lack of transportation? No   Unable to get utilities (heat,electricity)? No            12/12/2024   Fall Risk   Fallen 2 or more times in the past year? No    Trouble with walking or balance? No        Patient-reported          12/12/2024   Activities of Daily Living- Home Safety   Needs help with the following daily activites None of the above   Safety concerns in the home None of the above     "        12/12/2024   Dental   Dentist two times every year? Yes            12/12/2024   Hearing Screening   Hearing concerns? (!) I NEED TO ASK PEOPLE TO SPEAK UP OR REPEAT THEMSELVES.    (!) TROUBLE UNDERSTANDING SOFT OR WHISPERED SPEECH.  He got a hearing aid about 10 years ago \"but I was really disappointed in it\" he reports it only really helped with watching TV and sitting at the dinner table, but this summer he bought an amplifier. He declines referral to audiology at this time        Multiple values from one day are sorted in reverse-chronological order         12/12/2024   Driving Risk Screening   Patient/family members have concerns about driving No            12/12/2024   General Alertness/Fatigue Screening   Have you been more tired than usual lately? No            12/12/2024   Urinary Incontinence Screening   Bothered by leaking urine in past 6 months No            12/12/2024   TB Screening   Were you born outside of the [place]? No      Today's PHQ-2 Score:       12/12/2024     9:11 AM   PHQ-2 ( 1999 [employer])   Q1: Little interest or pleasure in doing things 0    Q2: Feeling down, depressed or hopeless 0    PHQ-2 Score 0    Q1: Little interest or pleasure in doing things Not at all   Q2: Feeling down, depressed or hopeless Not at all   PHQ-2 Score 0       Patient-reported           12/12/2024   Substance Use   Alcohol more than 3/day or more than 7/wk Yes   How often do you have a drink containing alcohol 2 to 3 times a week   How many alcohol drinks on typical day 1 or 2   How often do you have 5+ drinks at one occasion Less than monthly   Audit 2/3 Score 1   How often not able to stop drinking once started Never   How often failed to do what normally expected Never   How often needed first drink in am after a heavy drinking session Never   How often feeling of guilt or remorse after drinking Never   How often unable to remember what happened the night before Never   Have you or someone else been injured " because of your drinking No   Has anyone been concerned or suggested you cut down on drinking No   TOTAL SCORE - AUDIT 4   Do you have a current opioid prescription? No   How severe/bad is pain from 1 to 10? 0/10 (No Pain)   Do you use any other substances recreationally? (!) CANNABIS PRODUCTS        Social History     Tobacco Use    Smoking status: Former    Smokeless tobacco: Never   Vaping Use    Vaping status: Never Used   Substance Use Topics    Alcohol use: Yes     Comment: 2-3 scotch per night    Drug use: Yes     Comment: occasional- marijuana       ASCVD Risk   The 10-year ASCVD risk score (Darío RASCON, et al., 2019) is: 21.9%    Values used to calculate the score:      Age: 74 years      Sex: Male      Is Non- : No      Diabetic: No      Tobacco smoker: No      Systolic Blood Pressure: 138 mmHg      Is BP treated: Yes      HDL Cholesterol: 76 mg/dL      Total Cholesterol: 181 mg/dL            Reviewed and updated as needed this visit by Provider                    Current providers sharing in care for this patient include:  Patient Care Team:  No Ref-Primary, Physician as PCP - Edin Smith MD as Assigned Surgical Provider  Sasha Fragoso MD as Assigned PCP    The following health maintenance items are reviewed in Epic and correct as of today:  Health Maintenance   Topic Date Due    LUNG CANCER SCREENING  Never done    ZOSTER IMMUNIZATION (2 of 3) 07/04/2017    BMP  12/15/2024    LIPID  12/15/2024    ANNUAL REVIEW OF HM ORDERS  12/15/2024    MEDICARE ANNUAL WELLNESS VISIT  12/12/2025    FALL RISK ASSESSMENT  12/12/2025    GLUCOSE  12/15/2026    RSV VACCINE (1 - 1-dose 75+ series) 07/17/2027    COLORECTAL CANCER SCREENING  12/14/2027    ADVANCE CARE PLANNING  12/12/2029    DTAP/TDAP/TD IMMUNIZATION (3 - Td or Tdap) 12/30/2032    PHQ-2 (once per calendar year)  Completed    INFLUENZA VACCINE  Completed    Pneumococcal Vaccine: 65+ Years  Completed    AORTIC  "ANEURYSM SCREENING (SYSTEM ASSIGNED)  Completed    COVID-19 Vaccine  Completed    HPV IMMUNIZATION  Aged Out    MENINGITIS IMMUNIZATION  Aged Out    RSV MONOCLONAL ANTIBODY  Aged Out    HEPATITIS C SCREENING  Discontinued     Review of Systems   Constitutional:  Negative for chills and fever.   HENT:  Negative for ear pain.    Eyes:  Negative for pain.   Respiratory:  Negative for cough.    Cardiovascular:  Negative for chest pain.   Gastrointestinal:  Negative for abdominal pain.   Genitourinary:  Negative for dysuria.   Musculoskeletal:  Positive for chronic knee pain   Skin:  Negative for rash.   Neurological:  Negative for headaches.          Objective    Exam  /74 (BP Location: Right arm, Patient Position: Sitting, Cuff Size: Adult Regular)   Pulse 68   Temp 98.1  F (36.7  C) (Tympanic)   Resp 20   Ht 1.752 m (5' 8.98\")   Wt 68.4 kg (150 lb 11.2 oz)   SpO2 99%   BMI 22.27 kg/m     Estimated body mass index is 22.27 kg/m  as calculated from the following:    Height as of this encounter: 1.752 m (5' 8.98\").    Weight as of this encounter: 68.4 kg (150 lb 11.2 oz).    Physical Exam  Constitutional:       General: He is not in acute distress.  HENT:      Head: Normocephalic and atraumatic.      Right Ear: External ear normal.      Left Ear: External ear normal.      Mouth/Throat:      Mouth: Mucous membranes are moist.      Pharynx: Oropharynx is clear. No oropharyngeal exudate or posterior oropharyngeal erythema.   Eyes:      Extraocular Movements: Extraocular movements intact.   Cardiovascular:      Rate and Rhythm: Normal rate and regular rhythm.      Heart sounds: Normal heart sounds.   Pulmonary:      Effort: Pulmonary effort is normal. No respiratory distress.      Breath sounds: Normal breath sounds. No wheezing or rhonchi.   Abdominal:      Palpations: Abdomen is soft. There is no mass.      Tenderness: There is no abdominal tenderness.   Musculoskeletal:         General: No deformity. Normal " range of motion.      Cervical back: Normal range of motion and neck supple.   Skin:     General: Skin is warm.      Findings: No rash.   Neurological:      General: No focal deficit present.      Mental Status: He is alert and oriented to person, place, and time.   Psychiatric:         Mood and Affect: Mood normal.                12/12/2024   Mini Cog   Clock Draw Score 2 Normal   3 Item Recall 3 objects recalled   Mini Cog Total Score 5                 Signed Electronically by: TAYE BRADLEY MD

## 2024-12-12 NOTE — PATIENT INSTRUCTIONS
Patient Education   Preventive Care Advice   This is general advice given by our system to help you stay healthy. However, your care team may have specific advice just for you. Please talk to your care team about your preventive care needs.  Nutrition  Eat 5 or more servings of fruits and vegetables each day.  Try wheat bread, brown rice and whole grain pasta (instead of white bread, rice, and pasta).  Get enough calcium and vitamin D. Check the label on foods and aim for 100% of the RDA (recommended daily allowance).  Lifestyle  Exercise at least 150 minutes each week  (30 minutes a day, 5 days a week).  Do muscle strengthening activities 2 days a week. These help control your weight and prevent disease.  No smoking.  Wear sunscreen to prevent skin cancer.  Have a dental exam and cleaning every 6 months.  Yearly exams  See your health care team every year to talk about:  Any changes in your health.  Any medicines your care team has prescribed.  Preventive care, family planning, and ways to prevent chronic diseases.  Shots (vaccines)   HPV shots (up to age 26), if you've never had them before.  Hepatitis B shots (up to age 59), if you've never had them before.  COVID-19 shot: Get this shot when it's due.  Flu shot: Get a flu shot every year.  Tetanus shot: Get a tetanus shot every 10 years.  Pneumococcal, hepatitis A, and RSV shots: Ask your care team if you need these based on your risk.  Shingles shot (for age 50 and up)  General health tests  Diabetes screening:  Starting at age 35, Get screened for diabetes at least every 3 years.  If you are younger than age 35, ask your care team if you should be screened for diabetes.  Cholesterol test: At age 39, start having a cholesterol test every 5 years, or more often if advised.  Bone density scan (DEXA): At age 50, ask your care team if you should have this scan for osteoporosis (brittle bones).  Hepatitis C: Get tested at least once in your life.  STIs (sexually  transmitted infections)  Before age 24: Ask your care team if you should be screened for STIs.  After age 24: Get screened for STIs if you're at risk. You are at risk for STIs (including HIV) if:  You are sexually active with more than one person.  You don't use condoms every time.  You or a partner was diagnosed with a sexually transmitted infection.  If you are at risk for HIV, ask about PrEP medicine to prevent HIV.  Get tested for HIV at least once in your life, whether you are at risk for HIV or not.  Cancer screening tests  Cervical cancer screening: If you have a cervix, begin getting regular cervical cancer screening tests starting at age 21.  Breast cancer scan (mammogram): If you've ever had breasts, begin having regular mammograms starting at age 40. This is a scan to check for breast cancer.  Colon cancer screening: It is important to start screening for colon cancer at age 45.  Have a colonoscopy test every 10 years (or more often if you're at risk) Or, ask your provider about stool tests like a FIT test every year or Cologuard test every 3 years.  To learn more about your testing options, visit:   .  For help making a decision, visit:   https://bit.ly/ge77627.  Prostate cancer screening test: If you have a prostate, ask your care team if a prostate cancer screening test (PSA) at age 55 is right for you.  Lung cancer screening: If you are a current or former smoker ages 50 to 80, ask your care team if ongoing lung cancer screenings are right for you.  For informational purposes only. Not to replace the advice of your health care provider. Copyright   2023 [facility] [employer]. All rights reserved. Clinically reviewed by the [facility] Transitions Program. [employer] 101513 - REV 01/24.  Hearing Loss: Care Instructions  Overview     Hearing loss is a sudden or slow decrease in how well you hear. It can range from slight to profound. Permanent hearing loss can occur with aging. It also can  happen when you are exposed long-term to loud noise. Examples include listening to loud music, riding motorcycles, or being around other loud machines.  Hearing loss can affect your work and home life. It can make you feel lonely or depressed. You may feel that you have lost your independence. But hearing aids and other devices can help you hear better and feel connected to others.  Follow-up care is a key part of your treatment and safety. Be sure to make and go to all appointments, and call your doctor if you are having problems. It's also a good idea to know your test results and keep a list of the medicines you take.  How can you care for yourself at home?  Avoid loud noises whenever possible. This helps keep your hearing from getting worse.  Always wear hearing protection around loud noises.  Wear a hearing aid as directed.  A professional can help you pick a hearing aid that will work best for you.  You can also get hearing aids over the counter for mild to moderate hearing loss.  Have hearing tests as your doctor suggests. They can show whether your hearing has changed. Your hearing aid may need to be adjusted.  Use other devices as needed. These may include:  Telephone amplifiers and hearing aids that can connect to a television, stereo, radio, or microphone.  Devices that use lights or vibrations. These alert you to the doorbell, a ringing telephone, or a baby monitor.  Television closed-captioning. This shows the words at the bottom of the screen. Most new TVs can do this.  TTY (text telephone). This lets you type messages back and forth on the telephone instead of talking or listening. These devices are also called TDD. When messages are typed on the keyboard, they are sent over the phone line to a receiving TTY. The message is shown on a monitor.  Use text messaging, social media, and email if it is hard for you to communicate by telephone.  Try to learn a listening technique called speechreading. It is  "not lipreading. You pay attention to people's gestures, expressions, posture, and tone of voice. These clues can help you understand what a person is saying. Face the person you are talking to, and have them face you. Make sure the lighting is good. You need to see the other person's face clearly.  Think about counseling if you need help to adjust to your hearing loss.  When should you call for help?  Watch closely for changes in your health, and be sure to contact your doctor if:    You think your hearing is getting worse.     You have new symptoms, such as dizziness or nausea.   Where can you learn more?  Go to https://www.TotSpot.net/patiented  Enter R798 in the search box to learn more about \"Hearing Loss: Care Instructions.\"  Current as of: September 27, 2023  Content Version: 14.2 2024 [employer].   Care instructions adapted under license by your healthcare professional. If you have questions about a medical condition or this instruction, always ask your healthcare professional. [employer] disclaims any warranty or liability for your use of this information.    9 Ways to Cut Back on Drinking  Maybe you've found yourself drinking more alcohol than you'd prefer. If you want to cut back, here are some ideas to try.    Think before you drink.  Do you really want a drink, or is it just a habit? If you're used to having a drink at a certain time, try doing something else then.     Look for substitutes.  Find some no-alcohol drinks that you enjoy, like flavored seltzer water, tea with honey, or tonic with a slice of lime. Or try alcohol-free beer or \"virgin\" cocktails (without the alcohol).     Drink more water.  Use water to quench your thirst. Drink a glass of water before you have any alcohol. Have another glass along with every drink or between drinks.     Shrink your drink.  For example, have a bottle of beer instead of a pint. Use a smaller glass for wine. Choose drinks with " "lower alcohol content (ABV%). Or use less liquor and more mixer in cocktails.     Slow down.  It's easy to drink quickly and without thinking about it. Pay attention, and make each drink last longer.     Do the math.  Total up how much you spend on alcohol each month. How much is that a year? If you cut back, what could you do with the money you save?     Take a break.  Choose a day or two each week when you won't drink at all. Notice how you feel on those days, physically and emotionally. How did you sleep? Do you feel better? Over time, add more break days.     Count calories.  Would you like to lose some weight? For some people that's a good motivator for cutting back. Figure out how many calories are in each drink. How many does that add up to in a day? In a week? In a month?     Practice saying no.  Be ready when someone offers you a drink. Try: \"Thanks, I've had enough.\" Or \"Thanks, but I'm cutting back.\" Or \"No, thanks. I feel better when I drink less.\"   Current as of: November 15, 2023  Content Version: 14.2 2024 [facility] [employer].   Care instructions adapted under license by your healthcare professional. If you have questions about a medical condition or this instruction, always ask your healthcare professional. Healthwise, Incorporated disclaims any warranty or liability for your use of this information.  Substance Use Disorder: Care Instructions  Overview     You can improve your life and health by stopping your use of alcohol or drugs. When you don't drink or use drugs, you may feel and sleep better. You may get along better with your family, friends, and coworkers. There are medicines and programs that can help with substance use disorder.  How can you care for yourself at home?  Here are some ways to help you stay sober and prevent relapse.  If you have been given medicine to help keep you sober or reduce your cravings, be sure to take it exactly as prescribed.  Talk to your doctor about " programs that can help you stop using drugs or drinking alcohol.  Do not keep alcohol or drugs in your home.  Plan ahead. Think about what you'll say if other people ask you to drink or use drugs. Try not to spend time with people who drink or use drugs.  Use the time and money spent on drinking or drugs to do something that's important to you.  Preventing a relapse  Have a plan to deal with relapse. Learn to recognize changes in your thinking that lead you to drink or use drugs. Get help before you start to drink or use drugs again.  Try to stay away from situations, friends, or places that may lead you to drink or use drugs.  If you feel the need to drink alcohol or use drugs again, seek help right away. Call a trusted friend or family member. Some people get support from organizations such as Narcotics Anonymous or [employer] or from treatment facilities.  If you relapse, get help as soon as you can. Some people make a plan with another person that outlines what they want that person to do for them if they relapse. The plan usually includes how to handle the relapse and who to notify in case of relapse.  Don't give up. Remember that a relapse doesn't mean that you have failed. Use the experience to learn the triggers that lead you to drink or use drugs. Then quit again. Recovery is a lifelong process. Many people have several relapses before they are able to quit for good.  Follow-up care is a key part of your treatment and safety. Be sure to make and go to all appointments, and call your doctor if you are having problems. It's also a good idea to know your test results and keep a list of the medicines you take.  When should you call for help?   Call 911  anytime you think you may need emergency care. For example, call if you or someone else:    Has overdosed or has withdrawal signs. Be sure to tell the emergency workers that you are or someone else is using or trying to quit using drugs. Overdose or  "withdrawal signs may include:  Losing consciousness.  Seizure.  Seeing or hearing things that aren't there (hallucinations).     Is thinking or talking about suicide or harming others.   Where to get help 24 hours a day, 7 days a week   If you or someone you know talks about suicide, self-harm, a mental health crisis, a substance use crisis, or any other kind of emotional distress, get help right away. You can:    Call the Suicide and Crisis Lifeline at 148.     Call 7-754-506-TMMK (1-688.759.7255).     Text HOME to 991069 to access the Crisis Text Line.   Consider saving these numbers in your phone.  Go to [employer] for more information or to chat online.  Call your doctor now or seek immediate medical care if:    You are having withdrawal symptoms. These may include nausea or vomiting, sweating, shakiness, and anxiety.   Watch closely for changes in your health, and be sure to contact your doctor if:    You have a relapse.     You need more help or support to stop.   Where can you learn more?  Go to https://www.COMPS.com.net/patiented  Enter H573 in the search box to learn more about \"Substance Use Disorder: Care Instructions.\"  Current as of: November 15, 2023  Content Version: 14.2 2024 [facility] [email or web address].   Care instructions adapted under license by your healthcare professional. If you have questions about a medical condition or this instruction, always ask your healthcare professional. Healthwise, Incorporated disclaims any warranty or liability for your use of this information.       "

## 2024-12-12 NOTE — NURSING NOTE
Immunizations Administered       Name Date Dose VIS Date Route    COVID-19 12+ (Pfizer) 12/12/24  9:27 AM 0.3 mL EUI,10/17/2024,Given today Intramuscular          Patient presents requesting Covid-19 vaccination.  Standing orders implemented. Patient is remaining in clinic for 15 minutes to monitor for adverse reactions.    Vaccination given by Bethany RUIZ LPN on 12/12/2024 at 9:31 AM

## 2024-12-24 ENCOUNTER — LAB (OUTPATIENT)
Dept: LAB | Facility: CLINIC | Age: 74
End: 2024-12-24
Payer: COMMERCIAL

## 2024-12-24 DIAGNOSIS — E78.5 HYPERLIPIDEMIA LDL GOAL <130: ICD-10-CM

## 2024-12-24 DIAGNOSIS — I10 ESSENTIAL HYPERTENSION, BENIGN: ICD-10-CM

## 2024-12-24 DIAGNOSIS — Z12.5 SCREENING FOR PROSTATE CANCER: ICD-10-CM

## 2024-12-24 LAB
ANION GAP SERPL CALCULATED.3IONS-SCNC: 11 MMOL/L (ref 7–15)
BUN SERPL-MCNC: 19 MG/DL (ref 8–23)
CALCIUM SERPL-MCNC: 9.5 MG/DL (ref 8.8–10.4)
CHLORIDE SERPL-SCNC: 101 MMOL/L (ref 98–107)
CHOLEST SERPL-MCNC: 183 MG/DL
CREAT SERPL-MCNC: 0.91 MG/DL (ref 0.67–1.17)
EGFRCR SERPLBLD CKD-EPI 2021: 90 ML/MIN/1.73M2
FASTING STATUS PATIENT QL REPORTED: YES
FASTING STATUS PATIENT QL REPORTED: YES
GLUCOSE SERPL-MCNC: 95 MG/DL (ref 70–99)
HCO3 SERPL-SCNC: 26 MMOL/L (ref 22–29)
HDLC SERPL-MCNC: 64 MG/DL
LDLC SERPL CALC-MCNC: 84 MG/DL
NONHDLC SERPL-MCNC: 119 MG/DL
POTASSIUM SERPL-SCNC: 4.2 MMOL/L (ref 3.4–5.3)
PSA SERPL DL<=0.01 NG/ML-MCNC: 0.98 NG/ML (ref 0–6.5)
SODIUM SERPL-SCNC: 138 MMOL/L (ref 135–145)
TRIGL SERPL-MCNC: 176 MG/DL

## 2024-12-24 PROCEDURE — 80048 BASIC METABOLIC PNL TOTAL CA: CPT

## 2024-12-24 PROCEDURE — 36415 COLL VENOUS BLD VENIPUNCTURE: CPT

## 2024-12-24 PROCEDURE — G0103 PSA SCREENING: HCPCS

## 2024-12-24 PROCEDURE — 80061 LIPID PANEL: CPT

## 2024-12-24 RX ORDER — ATORVASTATIN CALCIUM 20 MG/1
20 TABLET, FILM COATED ORAL DAILY
Qty: 90 TABLET | Refills: 3 | Status: SHIPPED | OUTPATIENT
Start: 2024-12-24

## 2024-12-24 NOTE — RESULT ENCOUNTER NOTE
Danilo Yuen,     It is a pleasure providing you with medical care. I have received and reviewed your results, and have the following recommendations:     Your basic metabolic panel was within normal limits indicating that you are not suspected of having kidney disease or electrolyte abnormalities.    Your PSA was within normal limits indicating you are not suspected having prostate cancer.    Your lipid panel for the most part was within normal limits other than an elevated triglyceride.  Because of this elevated triglyceride, I do plan on increasing your Lipitor from 10 mg to 20 mg.  An updated prescription has been sent to your pharmacy.  Whatever leftovers you have of your 10 mg dose you can take 2 tablets of that, afterwards you can start taking 1 tablet of the 20 mg once you pick it up.    Other lifestyle changes to incorporate to help decrease your risk for heart attack or stroke include: Try to limit your dietary intake of fatty, greasy, oily, fried, take out, and fast food when possible. Also, I would suggest you cut back on red and processed meats, sodium and sugar-sweetened foods and beverages. Regarding exercise, please get at least 30 minutes of CONTINUOUS moderate intensity aerobic exercise at least 3 times per week.    Sincerely,     Sasha Fragoso MD

## 2025-02-25 ENCOUNTER — ANCILLARY PROCEDURE (OUTPATIENT)
Dept: GENERAL RADIOLOGY | Facility: CLINIC | Age: 75
End: 2025-02-25
Attending: FAMILY MEDICINE
Payer: COMMERCIAL

## 2025-02-25 ENCOUNTER — OFFICE VISIT (OUTPATIENT)
Dept: ORTHOPEDICS | Facility: CLINIC | Age: 75
End: 2025-02-25
Payer: COMMERCIAL

## 2025-02-25 VITALS — WEIGHT: 153 LBS | BODY MASS INDEX: 22.66 KG/M2 | HEIGHT: 69 IN

## 2025-02-25 DIAGNOSIS — M25.561 BILATERAL CHRONIC KNEE PAIN: ICD-10-CM

## 2025-02-25 DIAGNOSIS — M21.161 GENU VARUM OF BOTH LOWER EXTREMITIES: ICD-10-CM

## 2025-02-25 DIAGNOSIS — G89.29 BILATERAL CHRONIC KNEE PAIN: ICD-10-CM

## 2025-02-25 DIAGNOSIS — M25.461 EFFUSION OF RIGHT KNEE: ICD-10-CM

## 2025-02-25 DIAGNOSIS — M17.0 PRIMARY OSTEOARTHRITIS OF BOTH KNEES: Primary | ICD-10-CM

## 2025-02-25 DIAGNOSIS — M21.162 GENU VARUM OF BOTH LOWER EXTREMITIES: ICD-10-CM

## 2025-02-25 DIAGNOSIS — M17.0 PRIMARY OSTEOARTHRITIS OF BOTH KNEES: ICD-10-CM

## 2025-02-25 DIAGNOSIS — M25.562 BILATERAL CHRONIC KNEE PAIN: ICD-10-CM

## 2025-02-25 PROCEDURE — 20611 DRAIN/INJ JOINT/BURSA W/US: CPT | Mod: RT | Performed by: FAMILY MEDICINE

## 2025-02-25 PROCEDURE — 99213 OFFICE O/P EST LOW 20 MIN: CPT | Mod: 25 | Performed by: FAMILY MEDICINE

## 2025-02-25 PROCEDURE — 73562 X-RAY EXAM OF KNEE 3: CPT | Mod: TC | Performed by: RADIOLOGY

## 2025-02-25 RX ORDER — ROPIVACAINE HYDROCHLORIDE 5 MG/ML
3 INJECTION, SOLUTION EPIDURAL; INFILTRATION; PERINEURAL
Status: COMPLETED | OUTPATIENT
Start: 2025-02-25 | End: 2025-02-25

## 2025-02-25 RX ORDER — TRIAMCINOLONE ACETONIDE 40 MG/ML
40 INJECTION, SUSPENSION INTRA-ARTICULAR; INTRAMUSCULAR
Status: COMPLETED | OUTPATIENT
Start: 2025-02-25 | End: 2025-02-25

## 2025-02-25 RX ADMIN — TRIAMCINOLONE ACETONIDE 40 MG: 40 INJECTION, SUSPENSION INTRA-ARTICULAR; INTRAMUSCULAR at 09:58

## 2025-02-25 RX ADMIN — ROPIVACAINE HYDROCHLORIDE 3 ML: 5 INJECTION, SOLUTION EPIDURAL; INFILTRATION; PERINEURAL at 09:58

## 2025-02-25 NOTE — LETTER
2025      Cornel Yuen  [address]  [place] [address]      Dear Colleague,    Thank you for referring your patient, Cornel Yuen, to the [facility] [place]. Please see a copy of my visit note below.    Cornel Yeun  :  1950  DOS: 25  MRN: [id number]    Sports Medicine Clinic Visit    PCP: Noah Brumfield    Cornel Yuen is a 70 year old male who is seen as a self referral presenting with bilateral knee pain.    Interim History - 2025  Since last visit on 22 patient has waxing & waning moderate bilateral posterior & medial knee pain over the past ~ 3+ weeks.  Patient notes stumbling/slipping while walking is dog that caused increased pain ~ 3 weeks ago.  Currently managing with glucosamine supplementation & OTC pain medications as needed.  Pain is worse with shuffling, going from sit to stand, & stairs.  No recent imaging completed. Patient would like to discuss steroid injection today.      Review of Systems  Musculoskeletal: as above  Remainder of review of systems is negative including constitutional, CV, pulmonary, GI, Skin and Neurologic except as noted in HPI or medical history.    Past Medical History:   Diagnosis Date     Actinic keratosis      Basal cell carcinoma      Essential hypertension, benign      Squamous cell carcinoma      Past Surgical History:   Procedure Laterality Date     COLONOSCOPY N/A 2022    Procedure: COLONOSCOPY, FLEXIBLE, WITH LESION REMOVAL USING SNARE;  Surgeon: Timmy Rodrigues DO;  Location: [facility]     HC REPAIR OF NASAL SEPTUM       HERNIA REPAIR, INGUINAL RT/LT       TONSILLECTOMY & ADENOIDECTOMY       ZZC ANESTH,KNEE JOINT; NOS       Family History   Problem Relation Age of Onset     Cancer Father         brain tumor     Diabetes Maternal Grandmother      Alzheimer Disease Maternal Grandmother      C.A.D. Maternal Grandfather      Alzheimer Disease Maternal Grandfather       "Unknown/Adopted Paternal Grandmother      KELECHI Paternal Grandfather      Neurologic Disorder Mother         memory issues     Alzheimer Disease Mother      Asthma Daughter      Melanoma No family hx of        Objective  Ht 1.753 m (5' 9\")   Wt 69.4 kg (153 lb)   BMI 22.59 kg/m      General: healthy, alert and in no distress    HEENT: no scleral icterus or conjunctival erythema   Skin: no suspicious lesions or rash. No jaundice.   CV: regular rhythm by palpation, 2+ distal pulses, no pedal edema    Resp: normal respiratory effort without conversational dyspnea   Psych: normal mood and affect    Gait: mildly antalgic, appropriate coordination and balance   Neuro: normal light touch sensory exam of the extremities. Motor strength as noted below     Right Knee exam    ROM:        Full active and passive ROM with flexion and extension       Mildly limited terminal ROM on R>L due to stiffness and pain    Inspection:       no visible ecchymosis       Trace effusion right by palpation    Skin:       no visible deformities       well perfused       capillary refill brisk    Patellar Motion:        Normal patellar tracking noted through range of motion       Crepitus noted in the patellofemoral joint    Tender:        medial joint line very mild    Non Tender:         remainder of knee area        medial patellar border        lateral patellar border        lateral joint line        along MCL        infrapatellar tendon        tibial tubercle    Special Tests:        neg (-) Lachman       neg (-) varus at 0 deg and 30 deg       neg (-) valgus at 0 deg and 30 deg       Mild pain with terminal flexion on the right    Evaluation of ipsilateral kinetic chain       normal strength with hip extension and abduction, tight right hamstring       Mildly decreased quad activation/conditioning, R>L      Radiology  Recent Results (from the past 744 hour(s))   XR Knee Standing Bilateral 3 Views    Narrative    KNEE STANDING BILATERAL " THREE VIEWS  9/10/2020 9:44 AM     HISTORY: Acute pain of both knees.    COMPARISON: 1/7/2009.      Impression    IMPRESSION:   1. Right knee: Marked medial joint space narrowing has progressed  since the prior exam. There is associated mild medial marginal bony  spurring. No acute-appearing bony or soft tissue abnormality and no  joint space effusion.    2. Left knee: Moderate medial joint space narrowing without  significant change. No acute bony or soft tissue abnormality or joint  space effusion.    EDER SERRANO MD     Large Joint Injection/Arthocentesis: R knee joint    Date/Time: 2/25/2025 9:58 AM    Performed by: Elmo Castro DO  Authorized by: Elmo Castro DO    Indications:  Pain and osteoarthritis  Needle Size:  22 G  Guidance: ultrasound    Approach:  Superolateral  Location:  Knee      Medications:  40 mg triamcinolone 40 MG/ML; 3 mL ROPivacaine 5 MG/ML  Aspirate amount (mL):  5  Aspirate:  Serous and yellow  Outcome:  Tolerated well, no immediate complications  Procedure discussed: discussed risks, benefits, and alternatives    Consent Given by:  Patient  Timeout: timeout called immediately prior to procedure    Prep: patient was prepped and draped in usual sterile fashion     Ultrasound images of procedure were permanently stored.       Assessment:  1. Primary osteoarthritis of both knees    2. Bilateral chronic knee pain    3. Effusion of right knee    4. Genu varum of both lower extremities        Plan:  Discussed the assessment with the patient.  Follow up: prn based on sx severity  Pain is recently better, seems to wax and wane  XR images independently visualized and reviewed with patient today in clinic, no significant progression of prior moderate/advanced changes in the medial compartments, R>L  R>L medial compartment DJD, matching clinically  Likely right is progressed faster due to old injury and meniscectomy  We discussed modified progressive pain-free activity as  tolerated  Reviewed activity modification and progressive increase in activity as tolerated and guided by pain  PT offered and available in the future if desired, declined for now  Reviewed options for potential steroid vs viscosupplementation injections and the possibility for future orthopedic referral prn  Reviewed safe and appropriate OTC medication choices, try tylenol first  Up to 3000mg daily of tylenol is generally safe, NSAID dosing and duration limitations reviewed, consider trying topical voltaren gel  Bracing options reviewed including  option, declined for now  After review of relative risks, goals and limitations, US guided CSI to the right knee joint today, with aspiration of effusion before injection  Discussed nature of degenerative arthrosis of the knee.   Discussed symptom treatment with ice or heat, topical treatments, and rest if needed.   Home handouts provided and supportive care reviewed  All questions were answered today  Contact us with additional questions or concerns  Signs and sx of concern reviewed      Elmo Castro DO, BILL  Primary Care Sports Medicine  [facility]               Disclaimer: This note consists of symbols derived from keyboarding, dictation and/or voice recognition software. As a result, there may be errors in the script that have gone undetected. Please consider this when interpreting information found in this chart.      Again, thank you for allowing me to participate in the care of your patient.        Sincerely,        Elmo Castro DO    Electronically signed

## 2025-02-25 NOTE — PROGRESS NOTES
"Cornel Yuen  :  1950  DOS: 25  MRN: [id number]    Sports Medicine Clinic Visit    PCP: Noah Brumfield    Cornel Yuen is a 70 year old male who is seen as a self referral presenting with bilateral knee pain.    Interim History - 2025  Since last visit on 22 patient has waxing & waning moderate bilateral posterior & medial knee pain over the past ~ 3+ weeks.  Patient notes stumbling/slipping while walking is dog that caused increased pain ~ 3 weeks ago.  Currently managing with glucosamine supplementation & OTC pain medications as needed.  Pain is worse with shuffling, going from sit to stand, & stairs.  No recent imaging completed. Patient would like to discuss steroid injection today.      Review of Systems  Musculoskeletal: as above  Remainder of review of systems is negative including constitutional, CV, pulmonary, GI, Skin and Neurologic except as noted in HPI or medical history.    Past Medical History:   Diagnosis Date    Actinic keratosis     Basal cell carcinoma     Essential hypertension, benign     Squamous cell carcinoma      Past Surgical History:   Procedure Laterality Date    COLONOSCOPY N/A 2022    Procedure: COLONOSCOPY, FLEXIBLE, WITH LESION REMOVAL USING SNARE;  Surgeon: Timmy Rodrigues DO;  Location: [facility]    [facility] REPAIR OF NASAL SEPTUM      HERNIA REPAIR, INGUINAL RT/LT      TONSILLECTOMY & ADENOIDECTOMY      ZZC ANESTH,KNEE JOINT; NOS       Family History   Problem Relation Age of Onset    Cancer Father         brain tumor    Diabetes Maternal Grandmother     Alzheimer Disease Maternal Grandmother     C.A.D. Maternal Grandfather     Alzheimer Disease Maternal Grandfather     Unknown/Adopted Paternal Grandmother     C.A.D. Paternal Grandfather     Neurologic Disorder Mother         memory issues    Alzheimer Disease Mother     Asthma Daughter     Melanoma No family hx of        Objective  Ht 1.753 m (5' 9\")   Wt 69.4 kg (153 lb)   BMI " 22.59 kg/m      General: healthy, alert and in no distress    HEENT: no scleral icterus or conjunctival erythema   Skin: no suspicious lesions or rash. No jaundice.   CV: regular rhythm by palpation, 2+ distal pulses, no pedal edema    Resp: normal respiratory effort without conversational dyspnea   Psych: normal mood and affect    Gait: mildly antalgic, appropriate coordination and balance   Neuro: normal light touch sensory exam of the extremities. Motor strength as noted below     Right Knee exam    ROM:        Full active and passive ROM with flexion and extension       Mildly limited terminal ROM on R>L due to stiffness and pain    Inspection:       no visible ecchymosis       Trace effusion right by palpation    Skin:       no visible deformities       well perfused       capillary refill brisk    Patellar Motion:        Normal patellar tracking noted through range of motion       Crepitus noted in the patellofemoral joint    Tender:        medial joint line very mild    Non Tender:         remainder of knee area        medial patellar border        lateral patellar border        lateral joint line        along MCL        infrapatellar tendon        tibial tubercle    Special Tests:        neg (-) Lachman       neg (-) varus at 0 deg and 30 deg       neg (-) valgus at 0 deg and 30 deg       Mild pain with terminal flexion on the right    Evaluation of ipsilateral kinetic chain       normal strength with hip extension and abduction, tight right hamstring       Mildly decreased quad activation/conditioning, R>L      Radiology  Recent Results (from the past 744 hour(s))   XR Knee Standing Bilateral 3 Views    Narrative    KNEE STANDING BILATERAL THREE VIEWS  9/10/2020 9:44 AM     HISTORY: Acute pain of both knees.    COMPARISON: 1/7/2009.      Impression    IMPRESSION:   1. Right knee: Marked medial joint space narrowing has progressed  since the prior exam. There is associated mild medial marginal  bony  spurring. No acute-appearing bony or soft tissue abnormality and no  joint space effusion.    2. Left knee: Moderate medial joint space narrowing without  significant change. No acute bony or soft tissue abnormality or joint  space effusion.    EDER SERRANO MD     Large Joint Injection/Arthocentesis: R knee joint    Date/Time: 2/25/2025 9:58 AM    Performed by: Elmo Castro DO  Authorized by: Elmo Castro DO    Indications:  Pain and osteoarthritis  Needle Size:  22 G  Guidance: ultrasound    Approach:  Superolateral  Location:  Knee      Medications:  40 mg triamcinolone 40 MG/ML; 3 mL ROPivacaine 5 MG/ML  Aspirate amount (mL):  5  Aspirate:  Serous and yellow  Outcome:  Tolerated well, no immediate complications  Procedure discussed: discussed risks, benefits, and alternatives    Consent Given by:  Patient  Timeout: timeout called immediately prior to procedure    Prep: patient was prepped and draped in usual sterile fashion     Ultrasound images of procedure were permanently stored.       Assessment:  1. Primary osteoarthritis of both knees    2. Bilateral chronic knee pain    3. Effusion of right knee    4. Genu varum of both lower extremities        Plan:  Discussed the assessment with the patient.  Follow up: prn based on sx severity  Pain is recently better, seems to wax and wane  XR images independently visualized and reviewed with patient today in clinic, no significant progression of prior moderate/advanced changes in the medial compartments, R>L  R>L medial compartment DJD, matching clinically  Likely right is progressed faster due to old injury and meniscectomy  We discussed modified progressive pain-free activity as tolerated  Reviewed activity modification and progressive increase in activity as tolerated and guided by pain  PT offered and available in the future if desired, declined for now  Reviewed options for potential steroid vs viscosupplementation injections and  the possibility for future orthopedic referral prn  Reviewed safe and appropriate OTC medication choices, try tylenol first  Up to 3000mg daily of tylenol is generally safe, NSAID dosing and duration limitations reviewed, consider trying topical voltaren gel  Bracing options reviewed including  option, declined for now  After review of relative risks, goals and limitations, US guided CSI to the right knee joint today, with aspiration of effusion before injection  Discussed nature of degenerative arthrosis of the knee.   Discussed symptom treatment with ice or heat, topical treatments, and rest if needed.   Home handouts provided and supportive care reviewed  All questions were answered today  Contact us with additional questions or concerns  Signs and sx of concern reviewed      Elmo Castro DO, BILL  Primary Care Sports Medicine  [facility]               Disclaimer: This note consists of symbols derived from keyboarding, dictation and/or voice recognition software. As a result, there may be errors in the script that have gone undetected. Please consider this when interpreting information found in this chart.

## 2025-04-07 ENCOUNTER — OFFICE VISIT (OUTPATIENT)
Dept: DERMATOLOGY | Facility: CLINIC | Age: 75
End: 2025-04-07
Payer: COMMERCIAL

## 2025-04-07 DIAGNOSIS — D18.01 ANGIOMA OF SKIN: ICD-10-CM

## 2025-04-07 DIAGNOSIS — M79.5 FOREIGN BODY (FB) IN SOFT TISSUE: ICD-10-CM

## 2025-04-07 DIAGNOSIS — L57.0 AK (ACTINIC KERATOSIS): ICD-10-CM

## 2025-04-07 DIAGNOSIS — D23.9 DERMAL NEVUS: Primary | ICD-10-CM

## 2025-04-07 DIAGNOSIS — Z85.828 HISTORY OF SKIN CANCER: ICD-10-CM

## 2025-04-07 DIAGNOSIS — L81.4 LENTIGO: ICD-10-CM

## 2025-04-07 DIAGNOSIS — L82.1 SEBORRHEIC KERATOSES: ICD-10-CM

## 2025-04-07 PROCEDURE — 99213 OFFICE O/P EST LOW 20 MIN: CPT | Mod: 25 | Performed by: DERMATOLOGY

## 2025-04-07 PROCEDURE — 17003 DESTRUCT PREMALG LES 2-14: CPT | Performed by: DERMATOLOGY

## 2025-04-07 PROCEDURE — 17000 DESTRUCT PREMALG LESION: CPT | Mod: 59 | Performed by: DERMATOLOGY

## 2025-04-07 PROCEDURE — 11106 INCAL BX SKN SINGLE LES: CPT | Performed by: DERMATOLOGY

## 2025-04-07 RX ORDER — DOXYCYCLINE 100 MG/1
100 CAPSULE ORAL 2 TIMES DAILY
Qty: 14 CAPSULE | Refills: 0 | Status: SHIPPED | OUTPATIENT
Start: 2025-04-07

## 2025-04-07 NOTE — LETTER
4/7/2025      Cornel Yuen  [address]  [place] [address]      Dear Colleague,    Thank you for referring your patient, Cornel Yuen, to the [facility]. Please see a copy of my visit note below.    Cornel Yuen is an extremely pleasant 74 year old year old male patient here today for hx of non-melanoma skin cancer.  Patient has no other skin complaints today.  Remainder of the HPI, Meds, PMH, Allergies, FH, and SH was reviewed in chart.      Past Medical History:   Diagnosis Date     Actinic keratosis      Basal cell carcinoma      Essential hypertension, benign      Squamous cell carcinoma        Past Surgical History:   Procedure Laterality Date     COLONOSCOPY N/A 12/14/2022    Procedure: COLONOSCOPY, FLEXIBLE, WITH LESION REMOVAL USING SNARE;  Surgeon: Timmy Rodrigues DO;  Location: [facility] REPAIR OF NASAL SEPTUM       HERNIA REPAIR, INGUINAL RT/LT       TONSILLECTOMY & ADENOIDECTOMY       ZZC ANESTH,KNEE JOINT; NOS          Family History   Problem Relation Age of Onset     Cancer Father         brain tumor     Diabetes Maternal Grandmother      Alzheimer Disease Maternal Grandmother      C.A.D. Maternal Grandfather      Alzheimer Disease Maternal Grandfather      Unknown/Adopted Paternal Grandmother      C.A.D. Paternal Grandfather      Neurologic Disorder Mother         memory issues     Alzheimer Disease Mother      Asthma Daughter      Melanoma No family hx of        Social History     Socioeconomic History     Marital status:      Spouse name: Not on file     Number of children: Not on file     Years of education: Not on file     Highest education level: Not on file   Occupational History     Not on file   Tobacco Use     Smoking status: Former     Smokeless tobacco: Never   Vaping Use     Vaping status: Never Used   Substance and Sexual Activity     Alcohol use: Yes     Comment: 2-3 scotch per night     Drug use: Yes     Comment: occasional-  marijuana     Sexual activity: Yes     Partners: Female     Birth control/protection: Surgical     Comment: vasectomy   Other Topics Concern     Parent/sibling w/ CABG, MI or angioplasty before 65F 55M? No   Social History Narrative     Not on file     Social Drivers of Health     Financial Resource Strain: Low Risk  (12/12/2024)    Financial Resource Strain      Within the past 12 months, have you or your family members you live with been unable to get utilities (heat, electricity) when it was really needed?: No   Food Insecurity: Low Risk  (12/12/2024)    Food Insecurity      Within the past 12 months, did you worry that your food would run out before you got money to buy more?: No      Within the past 12 months, did the food you bought just not last and you didn t have money to get more?: No   Transportation Needs: Low Risk  (12/12/2024)    Transportation Needs      Within the past 12 months, has lack of transportation kept you from medical appointments, getting your medicines, non-medical meetings or appointments, work, or from getting things that you need?: No   Physical Activity: Unknown (12/12/2024)    Exercise Vital Sign      Days of Exercise per Week: 3 days      Minutes of Exercise per Session: Not on file   Stress: No Stress Concern Present (12/12/2024)    [employer] - Occupational Stress Questionnaire      Feeling of Stress : Not at all   Social Connections: Unknown (12/12/2024)    Social Connection and Isolation Panel [NHANES]      Frequency of Communication with Friends and Family: Not on file      Frequency of Social Gatherings with Friends and Family: Twice a week      Attends [employer] Services: Not on file      Active Member of Clubs or Organizations: Not on file      Attends Club or Organization Meetings: Not on file      Marital Status: Not on file   Interpersonal Safety: Low Risk  (12/12/2024)    Interpersonal Safety      Do you feel physically and emotionally safe  where you currently live?: Yes      Within the past 12 months, have you been hit, slapped, kicked or otherwise physically hurt by someone?: No      Within the past 12 months, have you been humiliated or emotionally abused in other ways by your partner or ex-partner?: No   Housing Stability: Low Risk  (12/12/2024)    Housing Stability      Do you have housing? : Yes      Are you worried about losing your housing?: No       Outpatient Encounter Medications as of 4/7/2025   Medication Sig Dispense Refill     ASPIRIN 81 MG OR TABS 1 TABLET DAILY       atenolol (TENORMIN) 25 MG tablet Take 1 tablet (25 mg) by mouth daily. 90 tablet 3     atorvastatin (LIPITOR) 20 MG tablet Take 1 tablet (20 mg) by mouth daily. 90 tablet 3     glucosamine-chondroitinoitin 750-600 MG TABS Take 1 tablet by mouth daily. 90 tablet 3     losartan (COZAAR) 25 MG tablet Take 1 tablet (25 mg) by mouth daily. 90 tablet 3     No facility-administered encounter medications on file as of 4/7/2025.             O:   NAD, WDWN, Alert & Oriented, Mood & Affect wnl, Vitals stable   General appearance normal   Vitals stable   Alert, oriented and in no acute distress     L orbit 3 gritty scaly papule   R brow draining nodule     Stuck on papules and brown macules on trunk and ext   Red papules on trunk  Flesh colored papules on trunk     The remainder of the full exam was normal; the following areas were examined:  conjunctiva/lids, , neck, peripheral vascular system, abdomen, lymph nodes, digits/nails, eccrine and apocrine glands, scalp/hair, face, neck, chest, abdomen, buttocks, back, RUE, LUE, RLE, LLE       Eyes: Conjunctivae/lids:Normal     ENT: Lips, mucosa: normal    MSK:Normal    Cardiovascular: peripheral edema none    Pulm: Breathing Normal    Lymph Nodes: No Head and Neck Lymphadenopathy     Neuro/Psych: Orientation:Alert and Orientedx3 ; Mood/Affect:normal       A/P:  1. Seborrheic keratosis, lentigo, angioma, dermal nevus, hx of non-melanoma  skin cancer   2. L orbit actinic keratosis x3  LN2:  Treated with LN2 for 5s for 1-2 cycles. Warned risks of blistering, pain, pigment change, scarring, and incomplete resolution.  Advised patient to return if lesions do not completely resolve.  Wound care sheet given.  3. R brow r/o squamous cell carcinoma v foreign body   Incisional BIOPSY IN HOUSE:  After consent, anesthesia with LEC and prep, incision to subcutis performed and 2cm piece of wood removed     Doxy for one week  Return to clinic 2 months  It was a pleasure speaking to Cornel Yuen today.  Previous clinic notes and pertinent laboratory tests were reviewed prior to Cornel Yuen's visit.  Signs and Symptoms of skin cancer discussed with patient.  Patient encouraged to perform monthly skin exams.  UV precautions reviewed with patient.  Return to clinic 12 months    Again, thank you for allowing me to participate in the care of your patient.        Sincerely,        Edin De La Cruz MD    Electronically signed

## 2025-04-07 NOTE — PATIENT INSTRUCTIONS
Open Wound Care   FOR SUPERFICIAL WOUNDS   [facility] [phone]    Splinter removed from Right Brow.   Antibiotic prescribed today:  Take as directed.  Take with a meal to avoid upset stomach.  Take 30 min before laying down to avoid reflux symptoms.  Antibiotics can make you more sensitive/easily burnt by the sun. It is recommended that you wear a moisturizer with SPF.     Remove bandage in 24 hours and begin wound care as follows:     Clean area with tap water using a Q tip or gauze pad, (shower / bathe normally)  Dry wound with Q tip or gauze pad  Apply Aquaphor, Vaseline, Polysporin or Bacitracin Ointment with a Q tip  Do NOT use Neosporin Ointment *  Cover the wound with a band-aid or nonstick gauze pad and paper tape.  Repeat wound care once a day until wound is completely healed.    It is an old wives tale that a wound heals better when it is exposed to air and allowed to dry out. The wound will heal faster with a better cosmetic result if it is kept moist with ointment and covered with a bandage.  Do not let the wound dry out.      Supplies Needed:                Qtips or gauze pads                Aquaphor, Vaseline, Polysporin, or Bacitracin Ointment                Bandaids or nonstick gauze pads and paper tape      BLEEDING:    Use tightly rolled up gauze or cloth to apply direct pressure over the bandage for 20 minutes.  Reapply pressure for an additional 20 minutes if necessary  Call the office or go to the nearest emergency room if pressure fails to stop the bleeding.  Use additional gauze and tape to maintain pressure once the bleeding has stopped.  Begin wound care 24 hours after surgery as directed.                  WOUND HEALING    One week after surgery a pink / red halo will form around the outside of the wound.   This is new skin.  The center of the wound will appear yellowish white and produce some drainage.  The pink halo will slowly migrate in toward  the center of the wound until the wound is covered with new shiny pink skin.  There will be no more drainage when the wound is completely healed.  It will take six months to one year for the redness to fade.  The scar may be itchy, tight and sensitive to extreme temperatures for a year after the surgery.  Massaging the area several times a day for several minutes after the wound is completely healed will help the scar soften and normalize faster. Begin massage only after healing is complete.     WOUND CARE INSTRUCTIONS   FOR CRYOSURGERY     Left Brow  This area treated with liquid nitrogen should form a blister (areas treated may or may not blister-skin may just turn dark and slough off). You do not need to bandage the area unless a blister forms and breaks (which may be a few days). When the blister breaks, begin daily dressing changes as follows:     1) Clean and dry the area with tap water using clean Q-tip or sterile gauze pad.   2) Apply Polysporin, Aquaphor, Vaseline or Bacitracin ointment over entire wound. Do NOT use Neosporin ointment.   3) Cover the wound with a band-aid or sterile non-stick gauze pad and micropore paper tape.     REPEAT THESE INSTRUCTIONS AT LEAST ONCE A DAY UNTIL THE WOUND HAS COMPLETELY HEALED.   It is an old wives tale that a wound heals better when it is exposed to air and allowed to dry out. The wound will heal faster with a better cosmetic result if it is kept moist with ointment and covered with a bandage.   *Do not let the wound dry out*    Supplies Needed:     *Cotton tipped applicators (Q-tips)   *Aquaphor, Vaseline, Polysporin or Bacitracin ointment (NOT NEOSPORIN)   *Band-aids, or non stick gauze pads and micropore paper tape     PATIENT INFORMATION   During the healing process you will notice a number of changes. All wounds develop a small halo of redness surrounding the wound. This means healing is occurring.   Severe itching with extensive redness usually indicates  sensitivity to the ointment or bandage tape used to dress the wound. You should call our office if this develops.   Swelling and/or discoloration around your surgical site is common, particularly when performed around the eye.   All wounds normally drain. The larger the wound the more drainage there will be. After 7-10 days, you will notice the wound beginning to shrink and new skin will begin to grow. The wound is healed when you can see skin has formed over the entire area. A healed wound has a healthy, shiny look to the surface and is red to dark pink in color to normalize. Wounds may take approximately 4-6 weeks to heal. Larger wounds may take 6-8 weeks. After the wound is healed you may discontinue dressing changes.     You may experience a sensation of tightness as your wound heals. This is normal and will gradually subside.   Your healed wound may be sensitive to temperature changes. This sensitivity improves with time, but if you're having a lot of discomfort, try to avoid temperature extremes.     Patients frequently experience itching after their wound appears to have healed because of the continue healing under the skin. Plain Vaseline will help relieve the itching.         Please send a [employer] message to Dr De La Cruz the day of or before your telephone visit with an update on the status of Right Brow.   Please attach a picture to this message so the provider can visibly evaluate the site.    Once you log into [employer] select the Message button      Select Send a Message      Select Ask a Medical Question      Select Next      Select Updates About My Health      Select the provider you wish to send a message to from the list that populates (Dr. De La Cruz)      Enter Right brow into the subject line.      Enter the update that you wish to send to the provider into the message area.        Select the Attach button to add the photo(s) to the message to the provider.  You can select from your photo storage or  take a new photo if using [employer] mobile ashu.            Select Send               Proper skin care from [facility]:    -Eliminate harsh soaps as they strip the natural oils from the skin, often resulting in dry itchy skin ( i.e. Dial, Zest, Alina Spring)  -Use mild soaps such as Cetaphil or Dove Sensitive Skin in the shower. You do not need to use soap on arms, legs, and trunk every time you shower unless visibly soiled.   -Avoid hot or cold showers.  -After showering, lightly dry off and apply moisturizing within 2-3 minutes. This will help trap moisture in the skin.   -Aggressive use of a moisturizer at least 1-2 times a day to the entire body (including -Vanicream, Cetaphil, Aquaphor or Cerave) and moisturize hands after every washing.  -We recommend using moisturizers that come in a tub that needs to be scooped out, not a pump. This has more of an oil base. It will hold moisture in your skin much better than a water base moisturizer. The above recommended are non-pore clogging.      Wear a sunscreen with at least SPF 30 on your face, ears, neck and V of the chest daily. Wear sunscreen on other areas of the body if those areas are exposed to the sun throughout the day. Sunscreens can contain physical and/or chemical blockers. Physical blockers are less likely to clog pores, these include zinc oxide and titanium dioxide. Reapply every two hour and after swimming.     Sunscreen examples: https://www.ewg.org/sunscreen/    UV radiation  UVA radiation remains constant throughout the day and throughout the year. It is a longer wavelength than UVB and therefore penetrates deeper into the skin leading to immediate and delayed tanning, photoaging, and skin cancer. 70-80% of UVA and UVB radiation occurs between the hours of 10am-2pm.  UVB radiation  UVB radiation causes the most harmful effects and is more significant during the summer months. However, snow and ice can reflect UVB radiation leading to skin  damage during the winter months as well. UVB radiation is responsible for tanning, burning, inflammation, delayed erythema (pinkness), pigmentation (brown spots), and skin cancer.     I recommend self monthly full body exams and yearly full body exams with a dermatology provider. If you develop a new or changing lesion please follow up for examination. Most skin cancers are pink and scaly or pink and pearly. However, we do see blue/brown/black skin cancers.  Consider the ABCDEs of melanoma when giving yourself your monthly full body exam ( don't forget the groin, buttocks, feet, toes, etc). A-asymmetry, B-borders, C-color, D-diameter, E-elevation or evolving. If you see any of these changes please follow up in clinic. If you cannot see your back I recommend purchasing a hand held mirror to use with a larger wall mirror.       Checking for Skin Cancer  You can find cancer early by checking your skin each month. There are 3 kinds of skin cancer. They are melanoma, basal cell carcinoma, and squamous cell carcinoma. Doing monthly skin checks is the best way to find new marks or skin changes. Follow the instructions below for checking your skin.   The ABCDEs of checking moles for melanoma   Check your moles or growths for signs of melanoma using ABCDE:   Asymmetry: the sides of the mole or growth don t match  Border: the edges are ragged, notched, or blurred  Color: the color within the mole or growth varies  Diameter: the mole or growth is larger than 6 mm (size of a pencil eraser)  Evolving: the size, shape, or color of the mole or growth is changing (evolving is not shown in the images below)    Checking for other types of skin cancer  Basal cell carcinoma or squamous cell carcinoma have symptoms such as:     A spot or mole that looks different from all other marks on your skin  Changes in how an area feels, such as itching, tenderness, or pain  Changes in the skin's surface, such as oozing, bleeding, or scaliness  A  sore that does not heal  New swelling or redness beyond the border of a mole    Who s at risk?  Anyone can get skin cancer. But you are at greater risk if you have:   Fair skin, light-colored hair, or light-colored eyes  Many moles or abnormal moles on your skin  A history of sunburns from sunlight or tanning beds  A family history of skin cancer  A history of exposure to radiation or chemicals  A weakened immune system  If you have had skin cancer in the past, you are at risk for recurring skin cancer.   How to check your skin  Do your monthly skin checkups in front of a full-length mirror. Check all parts of your body, including your:   Head (ears, face, neck, and scalp)  Torso (front, back, and sides)  Arms (tops, undersides, upper, and lower armpits)  Hands (palms, backs, and fingers, including under the nails)  Buttocks and genitals  Legs (front, back, and sides)  Feet (tops, soles, toes, including under the nails, and between toes)  If you have a lot of moles, take digital photos of them each month. Make sure to take photos both up close and from a distance. These can help you see if any moles change over time.   Most skin changes are not cancer. But if you see any changes in your skin, call your doctor right away. Only he or she can diagnose a problem. If you have skin cancer, seeing your doctor can be the first step toward getting the treatment that could save your life.   [employer] last reviewed this educational content on 4/1/2019 2000-2020 [employer]. [address] [place] [address], [place] [address]. All rights reserved. This information is not intended as a substitute for professional medical care. Always follow your healthcare professional's instructions.       When should I call my doctor?  If you are worsening or not improving, please, contact us or seek urgent care as noted below.     Who should I call with questions (adults)?    [facility] [place]  [phone]  For urgent needs outside of business hours call the [facility] at [phone] and ask for the dermatology resident on call to be paged  If this is a medical emergency and you are unable to reach an ER, Call [phone]      If you need a prescription refill, please contact your pharmacy. Refills are approved or denied by our Physicians during normal business hours, Monday through Friday.  Per office policy, refills will not be granted if you have not been seen within the past year (or sooner depending on the condition).

## 2025-04-07 NOTE — PROGRESS NOTES
Cornel Yuen is an extremely pleasant 74 year old year old male patient here today for hx of non-melanoma skin cancer.  Patient has no other skin complaints today.  Remainder of the HPI, Meds, PMH, Allergies, FH, and SH was reviewed in chart.      Past Medical History:   Diagnosis Date    Actinic keratosis     Basal cell carcinoma     Essential hypertension, benign     Squamous cell carcinoma        Past Surgical History:   Procedure Laterality Date    COLONOSCOPY N/A 12/14/2022    Procedure: COLONOSCOPY, FLEXIBLE, WITH LESION REMOVAL USING SNARE;  Surgeon: Timmy Rodrigues DO;  Location: [facility]    HC REPAIR OF NASAL SEPTUM      HERNIA REPAIR, INGUINAL RT/LT      TONSILLECTOMY & ADENOIDECTOMY      ZZC ANESTH,KNEE JOINT; NOS          Family History   Problem Relation Age of Onset    Cancer Father         brain tumor    Diabetes Maternal Grandmother     Alzheimer Disease Maternal Grandmother     C.A.D. Maternal Grandfather     Alzheimer Disease Maternal Grandfather     Unknown/Adopted Paternal Grandmother     C.A.D. Paternal Grandfather     Neurologic Disorder Mother         memory issues    Alzheimer Disease Mother     Asthma Daughter     Melanoma No family hx of        Social History     Socioeconomic History    Marital status:      Spouse name: Not on file    Number of children: Not on file    Years of education: Not on file    Highest education level: Not on file   Occupational History    Not on file   Tobacco Use    Smoking status: Former    Smokeless tobacco: Never   Vaping Use    Vaping status: Never Used   Substance and Sexual Activity    Alcohol use: Yes     Comment: 2-3 scotch per night    Drug use: Yes     Comment: occasional- marijuana    Sexual activity: Yes     Partners: Female     Birth control/protection: Surgical     Comment: vasectomy   Other Topics Concern    Parent/sibling w/ CABG, MI or angioplasty before 65F 55M? No   Social History Narrative    Not on file     Social Drivers of  Health     Financial Resource Strain: Low Risk  (12/12/2024)    Financial Resource Strain     Within the past 12 months, have you or your family members you live with been unable to get utilities (heat, electricity) when it was really needed?: No   Food Insecurity: Low Risk  (12/12/2024)    Food Insecurity     Within the past 12 months, did you worry that your food would run out before you got money to buy more?: No     Within the past 12 months, did the food you bought just not last and you didn t have money to get more?: No   Transportation Needs: Low Risk  (12/12/2024)    Transportation Needs     Within the past 12 months, has lack of transportation kept you from medical appointments, getting your medicines, non-medical meetings or appointments, work, or from getting things that you need?: No   Physical Activity: Unknown (12/12/2024)    Exercise Vital Sign     Days of Exercise per Week: 3 days     Minutes of Exercise per Session: Not on file   Stress: No Stress Concern Present (12/12/2024)    [employer] [place] [employer] - Occupational Stress Questionnaire     Feeling of Stress : Not at all   Social Connections: Unknown (12/12/2024)    Social Connection and Isolation Panel [NHANES]     Frequency of Communication with Friends and Family: Not on file     Frequency of Social Gatherings with Friends and Family: Twice a week     Attends Bahai Services: Not on file     Active Member of Clubs or Organizations: Not on file     Attends Club or Organization Meetings: Not on file     Marital Status: Not on file   Interpersonal Safety: Low Risk  (12/12/2024)    Interpersonal Safety     Do you feel physically and emotionally safe where you currently live?: Yes     Within the past 12 months, have you been hit, slapped, kicked or otherwise physically hurt by someone?: No     Within the past 12 months, have you been humiliated or emotionally abused in other ways by your partner or ex-partner?: No   Housing  Stability: Low Risk  (12/12/2024)    Housing Stability     Do you have housing? : Yes     Are you worried about losing your housing?: No       Outpatient Encounter Medications as of 4/7/2025   Medication Sig Dispense Refill    ASPIRIN 81 MG OR TABS 1 TABLET DAILY      atenolol (TENORMIN) 25 MG tablet Take 1 tablet (25 mg) by mouth daily. 90 tablet 3    atorvastatin (LIPITOR) 20 MG tablet Take 1 tablet (20 mg) by mouth daily. 90 tablet 3    glucosamine-chondroitinoitin 750-600 MG TABS Take 1 tablet by mouth daily. 90 tablet 3    losartan (COZAAR) 25 MG tablet Take 1 tablet (25 mg) by mouth daily. 90 tablet 3     No facility-administered encounter medications on file as of 4/7/2025.             O:   NAD, WDWN, Alert & Oriented, Mood & Affect wnl, Vitals stable   General appearance normal   Vitals stable   Alert, oriented and in no acute distress     L orbit 3 gritty scaly papule   R brow draining nodule     Stuck on papules and brown macules on trunk and ext   Red papules on trunk  Flesh colored papules on trunk     The remainder of the full exam was normal; the following areas were examined:  conjunctiva/lids, , neck, peripheral vascular system, abdomen, lymph nodes, digits/nails, eccrine and apocrine glands, scalp/hair, face, neck, chest, abdomen, buttocks, back, RUE, LUE, RLE, LLE       Eyes: Conjunctivae/lids:Normal     ENT: Lips, mucosa: normal    MSK:Normal    Cardiovascular: peripheral edema none    Pulm: Breathing Normal    Lymph Nodes: No Head and Neck Lymphadenopathy     Neuro/Psych: Orientation:Alert and Orientedx3 ; Mood/Affect:normal       A/P:  1. Seborrheic keratosis, lentigo, angioma, dermal nevus, hx of non-melanoma skin cancer   2. L orbit actinic keratosis x3  LN2:  Treated with LN2 for 5s for 1-2 cycles. Warned risks of blistering, pain, pigment change, scarring, and incomplete resolution.  Advised patient to return if lesions do not completely resolve.  Wound care sheet given.  3. R brow r/o  squamous cell carcinoma v foreign body   Incisional BIOPSY IN HOUSE:  After consent, anesthesia with LEC and prep, incision to subcutis performed and 2cm piece of wood removed     Doxy for one week  Return to clinic 2 months  It was a pleasure speaking to Cornel Yuen today.  Previous clinic notes and pertinent laboratory tests were reviewed prior to Cornel Yuen's visit.  Signs and Symptoms of skin cancer discussed with patient.  Patient encouraged to perform monthly skin exams.  UV precautions reviewed with patient.  Return to clinic 12 months

## 2025-06-23 ENCOUNTER — MYC MEDICAL ADVICE (OUTPATIENT)
Dept: ORTHOPEDICS | Facility: CLINIC | Age: 75
End: 2025-06-23
Payer: COMMERCIAL

## 2025-06-23 DIAGNOSIS — M17.0 PRIMARY OSTEOARTHRITIS OF BOTH KNEES: Primary | ICD-10-CM

## 2025-06-23 DIAGNOSIS — M25.461 EFFUSION OF RIGHT KNEE: ICD-10-CM

## 2025-06-23 NOTE — TELEPHONE ENCOUNTER
Patient scheduled for appointment on 7/15/25 @ [facility] [place] for discussion of viscosupplementation injection vs steroid injection of right knee.        Steroid  injection last completed 2/25/25.  Patient reports relief for <3 months.    Patient has failed 3 month trial of Pharmacologic Approach (e.g., topical NSAIDs, oral NSAIDs with or without oral proton pump inhibitors, CROWLEY-2 inhibitors, topical capsaicin, acetaminophen, tramadol, duloxetine, etc.):  Yes     Patient has completed 3 month trial of Non-Pharmacologic treatments (i.e., physical, psychosocial, or mind-body approach (e.g., exercise-land based or aquatic, physical therapy, karla chi, yoga, weight management, cognitive behavioral therapy, knee brace or cane, etc).  No    Has patient had prior reaction to Synvisc/SynviscOne or any alternative HA product?: No    Prior authorization referral for SynviscOne injection pended.    Please advise  GA Perez, ATC

## 2025-07-08 ENCOUNTER — VIRTUAL VISIT (OUTPATIENT)
Dept: DERMATOLOGY | Facility: CLINIC | Age: 75
End: 2025-07-08
Payer: COMMERCIAL

## 2025-07-08 DIAGNOSIS — Z85.828 HISTORY OF SKIN CANCER: Primary | ICD-10-CM

## 2025-07-08 PROCEDURE — 98013 SYNCH AUDIO-ONLY EST LOW 20: CPT | Performed by: DERMATOLOGY

## 2025-07-08 NOTE — PROGRESS NOTES
"    Cornel Yuen is a 74 year old male who is being evaluated via a phone  visit.      The patient has been notified of following:     \"This phone  visit will be conducted via a call between you and your physician/provider. We have found that certain health care needs can be provided without the need for an in-person physical exam.  This service lets us provide the care you need with a video conversation.  If a prescription is necessary we can send it directly to your pharmacy.  If lab work is needed we can place an order for that and you can then stop by our lab to have the test done at a later time.    Phone visits are billed at different rates depending on your insurance coverage.  Please reach out to your insurance provider with any questions.    If during the course of the call the physician/provider feels a phone visit is not appropriate, you will not be charged for this service.\"    Patient has given verbal consent for phone visit? Yes    How would you like to obtain your AVS? Lucina    Cornel Yuen is a 74 year old year old male patient here today for hx of non-melanoma skin cancer.  Recheck r brow.  All healed no issues.  Patient has no other skin complaints today.  Remainder of the HPI, Meds, PMH, Allergies, FH, and SH was reviewed in chart.      Past Medical History:   Diagnosis Date    Actinic keratosis     Basal cell carcinoma     Essential hypertension, benign     Squamous cell carcinoma        Past Surgical History:   Procedure Laterality Date    COLONOSCOPY N/A 12/14/2022    Procedure: COLONOSCOPY, FLEXIBLE, WITH LESION REMOVAL USING SNARE;  Surgeon: Timmy Rodrigues DO;  Location: [facility]    [facility] REPAIR OF NASAL SEPTUM      HERNIA REPAIR, INGUINAL RT/LT      TONSILLECTOMY & ADENOIDECTOMY      ZZC ANESTH,KNEE JOINT; NOS          Family History   Problem Relation Age of Onset    Cancer Father         brain tumor    Diabetes Maternal Grandmother     Alzheimer Disease Maternal Grandmother  "    KELECHI Maternal Grandfather     Alzheimer Disease Maternal Grandfather     Unknown/Adopted Paternal Grandmother     KELECHI Paternal Grandfather     Neurologic Disorder Mother         memory issues    Alzheimer Disease Mother     Asthma Daughter     Melanoma No family hx of        Social History     Socioeconomic History    Marital status:      Spouse name: Not on file    Number of children: Not on file    Years of education: Not on file    Highest education level: Not on file   Occupational History    Not on file   Tobacco Use    Smoking status: Former    Smokeless tobacco: Never   Vaping Use    Vaping status: Never Used   Substance and Sexual Activity    Alcohol use: Yes     Comment: 2-3 scotch per night    Drug use: Yes     Comment: occasional- marijuana    Sexual activity: Yes     Partners: Female     Birth control/protection: Surgical     Comment: vasectomy   Other Topics Concern    Parent/sibling w/ CABG, MI or angioplasty before 65F 55M? No   Social History Narrative    Not on file     Social Drivers of Health     Financial Resource Strain: Low Risk  (12/12/2024)    Financial Resource Strain     Within the past 12 months, have you or your family members you live with been unable to get utilities (heat, electricity) when it was really needed?: No   Food Insecurity: Low Risk  (12/12/2024)    Food Insecurity     Within the past 12 months, did you worry that your food would run out before you got money to buy more?: No     Within the past 12 months, did the food you bought just not last and you didn t have money to get more?: No   Transportation Needs: Low Risk  (12/12/2024)    Transportation Needs     Within the past 12 months, has lack of transportation kept you from medical appointments, getting your medicines, non-medical meetings or appointments, work, or from getting things that you need?: No   Physical Activity: Unknown (12/12/2024)    Exercise Vital Sign     Days of Exercise per Week: 3 days      Minutes of Exercise per Session: Not on file   Stress: No Stress Concern Present (12/12/2024)    [employer] - Occupational Stress Questionnaire     Feeling of Stress : Not at all   Social Connections: Unknown (12/12/2024)    Social Connection and Isolation Panel [NHANES]     Frequency of Communication with Friends and Family: Not on file     Frequency of Social Gatherings with Friends and Family: Twice a week     Attends Sabianism Services: Not on file     Active Member of Clubs or Organizations: Not on file     Attends Club or Organization Meetings: Not on file     Marital Status: Not on file   Interpersonal Safety: Low Risk  (12/12/2024)    Interpersonal Safety     Do you feel physically and emotionally safe where you currently live?: Yes     Within the past 12 months, have you been hit, slapped, kicked or otherwise physically hurt by someone?: No     Within the past 12 months, have you been humiliated or emotionally abused in other ways by your partner or ex-partner?: No   Housing Stability: Low Risk  (12/12/2024)    Housing Stability     Do you have housing? : Yes     Are you worried about losing your housing?: No       Outpatient Encounter Medications as of 7/8/2025   Medication Sig Dispense Refill    ASPIRIN 81 MG OR TABS 1 TABLET DAILY      atenolol (TENORMIN) 25 MG tablet Take 1 tablet (25 mg) by mouth daily. 90 tablet 3    atorvastatin (LIPITOR) 20 MG tablet Take 1 tablet (20 mg) by mouth daily. 90 tablet 3    doxycycline monohydrate (MONODOX) 100 MG capsule Take 1 capsule (100 mg) by mouth 2 times daily. 14 capsule 0    glucosamine-chondroitinoitin 750-600 MG TABS Take 1 tablet by mouth daily. 90 tablet 3    losartan (COZAAR) 25 MG tablet Take 1 tablet (25 mg) by mouth daily. 90 tablet 3     No facility-administered encounter medications on file as of 7/8/2025.             Review Of Systems  Skin: As above  Eyes: negative  Ears/Nose/Throat: negative  Respiratory: No shortness  of breath, dyspnea on exertion, cough, or hemoptysis  Cardiovascular: negative  Gastrointestinal: negative  Genitourinary: negative  Musculoskeletal: negative  Neurologic: negative  Psychiatric: negative  Hematologic/Lymphatic/Immunologic: negative  Endocrine: negative      O:   Alert & Orientedx3, Mood & Affect wnl,    General appearance normal   Alert, oriented and in no acute distress     Photos:R brow healed.       Pulm: Breathing Normal, talking in normal sentences, no shortness of breath during conversation    Neuro/Psych: Orientation:Alert and Orientedx3 ; Mood/Affect:normal ; no anxiety or depression     A/P:  1.Hx of non-melanoma skin cancer healed  It was a pleasure speaking to Cornel Yuen today.  Previous clinic  notes and pertinent laboratory tests were reviewed prior to Cornel Yuen's visit.  Patient encouraged to perform monthly skin exams.  UV precautions reviewed with patient.  Return to clinic next appt    Teledermatology information:  - Location of patient: home  - Location of teledermatologist: [facility]   - Reason teledermatology is appropriate: of National Emergency Regarding Coronavirus disease (COVID 19) Outbreak  - The patient's condition can safely be assessed using telemedicine: yes  - Method of transmission: store and forward teledermatology  - In-person dermatology visit recommendation: no  - Service start time:800am/pm  - Service end time:815am/pm  - Date of report: 07/08/25

## 2025-07-08 NOTE — LETTER
"7/8/2025      Cornel Yuen  [address]  [place] [address]      Dear Colleague,    Thank you for referring your patient, Cornel Yuen, to the [facility]. Please see a copy of my visit note below.        Cornel Yuen is a 74 year old male who is being evaluated via a phone  visit.      The patient has been notified of following:     \"This phone  visit will be conducted via a call between you and your physician/provider. We have found that certain health care needs can be provided without the need for an in-person physical exam.  This service lets us provide the care you need with a video conversation.  If a prescription is necessary we can send it directly to your pharmacy.  If lab work is needed we can place an order for that and you can then stop by our lab to have the test done at a later time.    Phone visits are billed at different rates depending on your insurance coverage.  Please reach out to your insurance provider with any questions.    If during the course of the call the physician/provider feels a phone visit is not appropriate, you will not be charged for this service.\"    Patient has given verbal consent for phone visit? Yes    How would you like to obtain your AVS? MyChart    Cornel Yuen is a 74 year old year old male patient here today for hx of non-melanoma skin cancer.  Recheck r amol.  All healed no issues.  Patient has no other skin complaints today.  Remainder of the HPI, Meds, PMH, Allergies, FH, and SH was reviewed in chart.      Past Medical History:   Diagnosis Date     Actinic keratosis      Basal cell carcinoma      Essential hypertension, benign      Squamous cell carcinoma        Past Surgical History:   Procedure Laterality Date     COLONOSCOPY N/A 12/14/2022    Procedure: COLONOSCOPY, FLEXIBLE, WITH LESION REMOVAL USING SNARE;  Surgeon: Timmy Rodrigues DO;  Location: [facility] REPAIR OF NASAL SEPTUM       HERNIA REPAIR, INGUINAL " RT/LT       TONSILLECTOMY & ADENOIDECTOMY       ZZC ANESTH,KNEE JOINT; NOS          Family History   Problem Relation Age of Onset     Cancer Father         brain tumor     Diabetes Maternal Grandmother      Alzheimer Disease Maternal Grandmother      C.A.D. Maternal Grandfather      Alzheimer Disease Maternal Grandfather      Unknown/Adopted Paternal Grandmother      C.A.D. Paternal Grandfather      Neurologic Disorder Mother         memory issues     Alzheimer Disease Mother      Asthma Daughter      Melanoma No family hx of        Social History     Socioeconomic History     Marital status:      Spouse name: Not on file     Number of children: Not on file     Years of education: Not on file     Highest education level: Not on file   Occupational History     Not on file   Tobacco Use     Smoking status: Former     Smokeless tobacco: Never   Vaping Use     Vaping status: Never Used   Substance and Sexual Activity     Alcohol use: Yes     Comment: 2-3 scotch per night     Drug use: Yes     Comment: occasional- marijuana     Sexual activity: Yes     Partners: Female     Birth control/protection: Surgical     Comment: vasectomy   Other Topics Concern     Parent/sibling w/ CABG, MI or angioplasty before 65F 55M? No   Social History Narrative     Not on file     Social Drivers of Health     Financial Resource Strain: Low Risk  (12/12/2024)    Financial Resource Strain      Within the past 12 months, have you or your family members you live with been unable to get utilities (heat, electricity) when it was really needed?: No   Food Insecurity: Low Risk  (12/12/2024)    Food Insecurity      Within the past 12 months, did you worry that your food would run out before you got money to buy more?: No      Within the past 12 months, did the food you bought just not last and you didn t have money to get more?: No   Transportation Needs: Low Risk  (12/12/2024)    Transportation Needs      Within the past 12 months, has  lack of transportation kept you from medical appointments, getting your medicines, non-medical meetings or appointments, work, or from getting things that you need?: No   Physical Activity: Unknown (12/12/2024)    Exercise Vital Sign      Days of Exercise per Week: 3 days      Minutes of Exercise per Session: Not on file   Stress: No Stress Concern Present (12/12/2024)    [employer] - Occupational Stress Questionnaire      Feeling of Stress : Not at all   Social Connections: Unknown (12/12/2024)    Social Connection and Isolation Panel [NHANES]      Frequency of Communication with Friends and Family: Not on file      Frequency of Social Gatherings with Friends and Family: Twice a week      Attends Pentecostal Services: Not on file      Active Member of Clubs or Organizations: Not on file      Attends Club or Organization Meetings: Not on file      Marital Status: Not on file   Interpersonal Safety: Low Risk  (12/12/2024)    Interpersonal Safety      Do you feel physically and emotionally safe where you currently live?: Yes      Within the past 12 months, have you been hit, slapped, kicked or otherwise physically hurt by someone?: No      Within the past 12 months, have you been humiliated or emotionally abused in other ways by your partner or ex-partner?: No   Housing Stability: Low Risk  (12/12/2024)    Housing Stability      Do you have housing? : Yes      Are you worried about losing your housing?: No       Outpatient Encounter Medications as of 7/8/2025   Medication Sig Dispense Refill     ASPIRIN 81 MG OR TABS 1 TABLET DAILY       atenolol (TENORMIN) 25 MG tablet Take 1 tablet (25 mg) by mouth daily. 90 tablet 3     atorvastatin (LIPITOR) 20 MG tablet Take 1 tablet (20 mg) by mouth daily. 90 tablet 3     doxycycline monohydrate (MONODOX) 100 MG capsule Take 1 capsule (100 mg) by mouth 2 times daily. 14 capsule 0     glucosamine-chondroitinoitin 750-600 MG TABS Take 1 tablet by mouth  daily. 90 tablet 3     losartan (COZAAR) 25 MG tablet Take 1 tablet (25 mg) by mouth daily. 90 tablet 3     No facility-administered encounter medications on file as of 7/8/2025.             Review Of Systems  Skin: As above  Eyes: negative  Ears/Nose/Throat: negative  Respiratory: No shortness of breath, dyspnea on exertion, cough, or hemoptysis  Cardiovascular: negative  Gastrointestinal: negative  Genitourinary: negative  Musculoskeletal: negative  Neurologic: negative  Psychiatric: negative  Hematologic/Lymphatic/Immunologic: negative  Endocrine: negative      O:   Alert & Orientedx3, Mood & Affect wnl,    General appearance normal   Alert, oriented and in no acute distress     Photos:R brow healed.       Pulm: Breathing Normal, talking in normal sentences, no shortness of breath during conversation    Neuro/Psych: Orientation:Alert and Orientedx3 ; Mood/Affect:normal ; no anxiety or depression     A/P:  1.Hx of non-melanoma skin cancer healed  It was a pleasure speaking to Cornel Yuen today.  Previous clinic  notes and pertinent laboratory tests were reviewed prior to Cornel Yuen's visit.  Patient encouraged to perform monthly skin exams.  UV precautions reviewed with patient.  Return to clinic next appt    Teledermatology information:  - Location of patient: home  - Location of teledermatologist: [facility]   - Reason teledermatology is appropriate: of National Emergency Regarding Coronavirus disease (COVID 19) Outbreak  - The patient's condition can safely be assessed using telemedicine: yes  - Method of transmission: store and forward teledermatology  - In-person dermatology visit recommendation: no  - Service start time:800am/pm  - Service end time:815am/pm  - Date of report: 07/08/25      Again, thank you for allowing me to participate in the care of your patient.        Sincerely,        Edin De La Cruz MD    Electronically signed

## 2025-07-15 ENCOUNTER — OFFICE VISIT (OUTPATIENT)
Dept: ORTHOPEDICS | Facility: CLINIC | Age: 75
End: 2025-07-15
Payer: COMMERCIAL

## 2025-07-15 DIAGNOSIS — M17.0 PRIMARY OSTEOARTHRITIS OF BOTH KNEES: Primary | ICD-10-CM

## 2025-07-15 PROCEDURE — 20611 DRAIN/INJ JOINT/BURSA W/US: CPT | Mod: RT | Performed by: FAMILY MEDICINE

## 2025-07-15 NOTE — PROGRESS NOTES
Cornel Yuen  :  1950  DOS: Jul 15, 2025  MRN: [id number]      Sports Medicine Clinic Procedure    Ultrasound Guided Right Intra-Articular Knee SynviscOne Injection, +/- Aspiration    Clinical History: Interim History - Jul 15, 2025  Since last visit on 2025 patient has moderate right knee pain.  Right knee steroid injection completed on 2025 provided relief for ~ 3 months.  Patient notes worsening pain with walking & bending knee over the past 1 - 2 months.  Presents for HA injection as previously discussed.  No new injury in the interim.    Diagnosis:   1. Primary osteoarthritis of both knees        Large Joint Injection/Arthocentesis: R knee joint    Date/Time: 7/15/2025 3:20 PM    Performed by: Elmo Castro DO  Authorized by: Elmo Castro DO    Indications:  Osteoarthritis  Needle Size:  21 G  Guidance: ultrasound    Approach:  Superolateral  Location:  Knee      Medications:  48 mg hylan 48 MG/6ML  Aspirate amount (mL):  6  Aspirate:  Serous and yellow  Outcome:  Tolerated well, no immediate complications  Procedure discussed: discussed risks, benefits, and alternatives    Consent Given by:  Patient  Timeout: timeout called immediately prior to procedure    Prep: patient was prepped and draped in usual sterile fashion     Ultrasound images of procedure were permanently stored.        Impression:  Successful Right intra-articular knee SynviscOne injection and aspiration.    Plan:  Follow up in one month if not improved  Expectations and limitations of SynviscOne were reviewed in detail  Often 4-6 weeks before full effect may be noticed  Usually covered up to every 6 months by insurance, but does not need to be repeated unless pain returns, at which point we would re-evaluate  Potential use of CSI in future for flares of pain reviewed in detail  Encouraged modified progressive pain-free activity as tolerated  HEP and Supportive care reviewed  All questions were  answered today  Contact us with additional questions or concerns  Signs and sx of concern reviewed    Elmo Castro DO, BILL  Primary Care Sports Medicine  [facility]

## 2025-07-15 NOTE — LETTER
7/15/2025      Cornel Yuen  [address]  [place] [address]      Dear Colleague,    Thank you for referring your patient, Cornel Yuen, to the [facility] [place]. Please see a copy of my visit note below.    Cornel Yuen  :  1950  DOS: Jul 15, 2025  MRN: [id number]      Sports Medicine Clinic Procedure    Ultrasound Guided Right Intra-Articular Knee SynviscOne Injection, +/- Aspiration    Clinical History: Interim History - Jul 15, 2025  Since last visit on 2025 patient has moderate right knee pain.  Right knee steroid injection completed on 2025 provided relief for ~ 3 months.  Patient notes worsening pain with walking & bending knee over the past 1 - 2 months.  Presents for HA injection as previously discussed.  No new injury in the interim.    Diagnosis:   1. Primary osteoarthritis of both knees        Large Joint Injection/Arthocentesis: R knee joint    Date/Time: 7/15/2025 3:20 PM    Performed by: Elmo Castro DO  Authorized by: Elmo Castro DO    Indications:  Osteoarthritis  Needle Size:  21 G  Guidance: ultrasound    Approach:  Superolateral  Location:  Knee      Medications:  48 mg hylan 48 MG/6ML  Aspirate amount (mL):  6  Aspirate:  Serous and yellow  Outcome:  Tolerated well, no immediate complications  Procedure discussed: discussed risks, benefits, and alternatives    Consent Given by:  Patient  Timeout: timeout called immediately prior to procedure    Prep: patient was prepped and draped in usual sterile fashion     Ultrasound images of procedure were permanently stored.        Impression:  Successful Right intra-articular knee SynviscOne injection and aspiration.    Plan:  Follow up in one month if not improved  Expectations and limitations of SynviscOne were reviewed in detail  Often 4-6 weeks before full effect may be noticed  Usually covered up to every 6 months by insurance, but does not need to be repeated  unless pain returns, at which point we would re-evaluate  Potential use of CSI in future for flares of pain reviewed in detail  Encouraged modified progressive pain-free activity as tolerated  HEP and Supportive care reviewed  All questions were answered today  Contact us with additional questions or concerns  Signs and sx of concern reviewed    Elmo Castro DO, CAQ  Primary Care Sports Medicine  [facility]       Again, thank you for allowing me to participate in the care of your patient.        Sincerely,        Elmo Castro DO    Electronically signed

## (undated) DEVICE — ENDO SNARE EXACTO COLD 9MM LOOP 2.4MMX230CM 00711115

## (undated) DEVICE — ENDO FORCEP ENDOJAW BIOPSY 2.8MMX230CM FB-220U

## (undated) RX ORDER — PROPOFOL 10 MG/ML
INJECTION, EMULSION INTRAVENOUS
Status: DISPENSED
Start: 2022-12-14